# Patient Record
Sex: FEMALE | Race: WHITE | Employment: FULL TIME | URBAN - METROPOLITAN AREA
[De-identification: names, ages, dates, MRNs, and addresses within clinical notes are randomized per-mention and may not be internally consistent; named-entity substitution may affect disease eponyms.]

---

## 2017-05-18 PROBLEM — R00.2 PALPITATIONS: Status: ACTIVE | Noted: 2017-05-18

## 2017-05-18 PROBLEM — F32.A DEPRESSIVE DISORDER: Status: ACTIVE | Noted: 2017-05-18

## 2017-05-18 PROBLEM — K58.9 IRRITABLE BOWEL SYNDROME: Status: ACTIVE | Noted: 2017-05-18

## 2017-05-18 PROBLEM — K21.9 GERD (GASTROESOPHAGEAL REFLUX DISEASE): Status: ACTIVE | Noted: 2017-05-18

## 2017-05-18 PROBLEM — R79.9 BLOOD CHEMISTRY ABNORMALITY: Status: ACTIVE | Noted: 2017-05-18

## 2018-06-13 PROBLEM — F32.A DEPRESSIVE DISORDER: Status: RESOLVED | Noted: 2017-05-18 | Resolved: 2018-06-13

## 2018-06-13 PROBLEM — K58.9 IRRITABLE BOWEL SYNDROME: Status: RESOLVED | Noted: 2017-05-18 | Resolved: 2018-06-13

## 2018-06-13 PROBLEM — R00.2 PALPITATIONS: Status: RESOLVED | Noted: 2017-05-18 | Resolved: 2018-06-13

## 2018-06-13 PROBLEM — R79.9 BLOOD CHEMISTRY ABNORMALITY: Status: RESOLVED | Noted: 2017-05-18 | Resolved: 2018-06-13

## 2018-07-19 ENCOUNTER — HOSPITAL ENCOUNTER (OUTPATIENT)
Dept: SLEEP CENTER | Age: 58
Discharge: HOME | End: 2018-07-19
Payer: COMMERCIAL

## 2018-07-19 PROCEDURE — 95810 POLYSOM 6/> YRS 4/> PARAM: CPT

## 2018-08-24 ENCOUNTER — HOSPITAL ENCOUNTER (OUTPATIENT)
Dept: SLEEP CENTER | Age: 58
Discharge: HOME | End: 2018-08-24
Payer: COMMERCIAL

## 2018-08-24 PROCEDURE — 95811 POLYSOM 6/>YRS CPAP 4/> PARM: CPT

## 2018-09-13 ENCOUNTER — HOSPITAL ENCOUNTER (OUTPATIENT)
Age: 58
Discharge: HOME | End: 2018-09-15
Payer: COMMERCIAL

## 2018-09-13 PROCEDURE — 87077 CULTURE AEROBIC IDENTIFY: CPT

## 2018-09-13 PROCEDURE — 87329 GIARDIA AG IA: CPT

## 2018-09-13 PROCEDURE — 87045 FECES CULTURE AEROBIC BACT: CPT

## 2018-09-13 PROCEDURE — 87324 CLOSTRIDIUM AG IA: CPT

## 2018-09-13 PROCEDURE — 87081 CULTURE SCREEN ONLY: CPT

## 2018-09-13 PROCEDURE — 87186 SC STD MICRODIL/AGAR DIL: CPT

## 2018-09-13 PROCEDURE — 89055 LEUKOCYTE ASSESSMENT FECAL: CPT

## 2018-09-14 LAB
C DIFFICILE TOXIN, EIA: NORMAL
C DIFFICILE TOXIN, EIA: NORMAL
GIARDIA ANTIGEN STOOL: NORMAL

## 2018-09-15 LAB
WHITE BLOOD CELLS (WBC), STOOL: NORMAL
WHITE BLOOD CELLS (WBC), STOOL: NORMAL

## 2018-09-16 LAB
CULTURE, STOOL: NORMAL
CULTURE, STOOL: NORMAL

## 2018-09-17 LAB
VRE CULTURE: NORMAL
VRE CULTURE: NORMAL

## 2018-12-15 ENCOUNTER — HOSPITAL ENCOUNTER (OUTPATIENT)
Dept: GENERAL RADIOLOGY | Age: 58
Discharge: HOME | End: 2018-12-17
Payer: COMMERCIAL

## 2018-12-15 ENCOUNTER — HOSPITAL ENCOUNTER (OUTPATIENT)
Age: 58
Discharge: HOME | End: 2018-12-17
Payer: COMMERCIAL

## 2018-12-15 ENCOUNTER — HOSPITAL ENCOUNTER (OUTPATIENT)
Age: 58
Discharge: HOME | End: 2018-12-15
Payer: COMMERCIAL

## 2018-12-15 DIAGNOSIS — R53.83 FATIGUE, UNSPECIFIED TYPE: ICD-10-CM

## 2018-12-15 DIAGNOSIS — E66.01 OBESITY, CLASS III, BMI 40-49.9 (MORBID OBESITY) (HCC): ICD-10-CM

## 2018-12-15 DIAGNOSIS — R19.7 DIARRHEA, UNSPECIFIED TYPE: ICD-10-CM

## 2018-12-15 DIAGNOSIS — E78.2 MIXED HYPERLIPIDEMIA: ICD-10-CM

## 2018-12-15 DIAGNOSIS — R06.02 SOB (SHORTNESS OF BREATH): ICD-10-CM

## 2018-12-15 DIAGNOSIS — E07.9 THYROID DISEASE: ICD-10-CM

## 2018-12-15 DIAGNOSIS — E55.9 AVITAMINOSIS D: ICD-10-CM

## 2018-12-15 DIAGNOSIS — R73.09 OTHER ABNORMAL GLUCOSE: ICD-10-CM

## 2018-12-15 DIAGNOSIS — R53.81 MALAISE: ICD-10-CM

## 2018-12-15 LAB
ALBUMIN SERPL-MCNC: 4.5 G/DL (ref 3.5–5.2)
ALP BLD-CCNC: 53 U/L (ref 35–104)
ALT SERPL-CCNC: 16 U/L (ref 0–32)
ANION GAP SERPL CALCULATED.3IONS-SCNC: 14 MMOL/L (ref 7–16)
AST SERPL-CCNC: 17 U/L (ref 0–31)
BACTERIA: ABNORMAL /HPF
BASOPHILS ABSOLUTE: 0.07 E9/L (ref 0–0.2)
BASOPHILS RELATIVE PERCENT: 0.8 % (ref 0–2)
BILIRUB SERPL-MCNC: 0.5 MG/DL (ref 0–1.2)
BILIRUBIN URINE: NEGATIVE
BLOOD, URINE: NEGATIVE
BUN BLDV-MCNC: 10 MG/DL (ref 6–20)
CALCIUM SERPL-MCNC: 8.8 MG/DL (ref 8.6–10.2)
CHLORIDE BLD-SCNC: 102 MMOL/L (ref 98–107)
CHOLESTEROL, TOTAL: 182 MG/DL (ref 0–199)
CLARITY: CLEAR
CO2: 25 MMOL/L (ref 22–29)
COLOR: YELLOW
CREAT SERPL-MCNC: 0.7 MG/DL (ref 0.5–1)
EOSINOPHILS ABSOLUTE: 0.12 E9/L (ref 0.05–0.5)
EOSINOPHILS RELATIVE PERCENT: 1.4 % (ref 0–6)
EPITHELIAL CELLS, UA: ABNORMAL /HPF
GFR AFRICAN AMERICAN: >60
GFR NON-AFRICAN AMERICAN: >60 ML/MIN/1.73
GLUCOSE BLD-MCNC: 114 MG/DL (ref 74–99)
GLUCOSE URINE: NEGATIVE MG/DL
HBA1C MFR BLD: 5.5 % (ref 4–5.6)
HCT VFR BLD CALC: 41.9 % (ref 34–48)
HDLC SERPL-MCNC: 42 MG/DL
HEMOGLOBIN: 13.5 G/DL (ref 11.5–15.5)
IMMATURE GRANULOCYTES #: 0.03 E9/L
IMMATURE GRANULOCYTES %: 0.3 % (ref 0–5)
KETONES, URINE: ABNORMAL MG/DL
LDL CHOLESTEROL CALCULATED: 112 MG/DL (ref 0–99)
LEUKOCYTE ESTERASE, URINE: ABNORMAL
LYMPHOCYTES ABSOLUTE: 2.42 E9/L (ref 1.5–4)
LYMPHOCYTES RELATIVE PERCENT: 27.8 % (ref 20–42)
MAGNESIUM: 2.1 MG/DL (ref 1.6–2.6)
MCH RBC QN AUTO: 30.5 PG (ref 26–35)
MCHC RBC AUTO-ENTMCNC: 32.2 % (ref 32–34.5)
MCV RBC AUTO: 94.8 FL (ref 80–99.9)
MONOCYTES ABSOLUTE: 0.62 E9/L (ref 0.1–0.95)
MONOCYTES RELATIVE PERCENT: 7.1 % (ref 2–12)
NEUTROPHILS ABSOLUTE: 5.46 E9/L (ref 1.8–7.3)
NEUTROPHILS RELATIVE PERCENT: 62.6 % (ref 43–80)
NITRITE, URINE: POSITIVE
PDW BLD-RTO: 12.5 FL (ref 11.5–15)
PH UA: 6 (ref 5–9)
PHOSPHORUS: 3.4 MG/DL (ref 2.5–4.5)
PLATELET # BLD: 286 E9/L (ref 130–450)
PMV BLD AUTO: 10.5 FL (ref 7–12)
POTASSIUM SERPL-SCNC: 3.8 MMOL/L (ref 3.5–5)
PROTEIN UA: NEGATIVE MG/DL
RBC # BLD: 4.42 E12/L (ref 3.5–5.5)
RBC UA: ABNORMAL /HPF (ref 0–2)
SODIUM BLD-SCNC: 141 MMOL/L (ref 132–146)
SPECIFIC GRAVITY UA: 1.02 (ref 1–1.03)
T4 FREE: 1.15 NG/DL (ref 0.93–1.7)
TOTAL PROTEIN: 7.7 G/DL (ref 6.4–8.3)
TRIGL SERPL-MCNC: 142 MG/DL (ref 0–149)
TSH SERPL DL<=0.05 MIU/L-ACNC: 0.9 UIU/ML (ref 0.27–4.2)
UROBILINOGEN, URINE: 0.2 E.U./DL
VLDLC SERPL CALC-MCNC: 28 MG/DL
WBC # BLD: 8.7 E9/L (ref 4.5–11.5)
WBC UA: ABNORMAL /HPF (ref 0–5)

## 2018-12-15 PROCEDURE — 71046 X-RAY EXAM CHEST 2 VIEWS: CPT

## 2018-12-15 PROCEDURE — 80053 COMPREHEN METABOLIC PANEL: CPT

## 2018-12-15 PROCEDURE — 36415 COLL VENOUS BLD VENIPUNCTURE: CPT

## 2018-12-15 PROCEDURE — 84443 ASSAY THYROID STIM HORMONE: CPT

## 2018-12-15 PROCEDURE — 80061 LIPID PANEL: CPT

## 2018-12-15 PROCEDURE — 81001 URINALYSIS AUTO W/SCOPE: CPT

## 2018-12-15 PROCEDURE — 83735 ASSAY OF MAGNESIUM: CPT

## 2018-12-15 PROCEDURE — 84439 ASSAY OF FREE THYROXINE: CPT

## 2018-12-15 PROCEDURE — 85025 COMPLETE CBC W/AUTO DIFF WBC: CPT

## 2018-12-15 PROCEDURE — 83036 HEMOGLOBIN GLYCOSYLATED A1C: CPT

## 2018-12-15 PROCEDURE — 84100 ASSAY OF PHOSPHORUS: CPT

## 2018-12-15 PROCEDURE — 82652 VIT D 1 25-DIHYDROXY: CPT

## 2018-12-18 LAB — VITAMIN D 1,25-DIHYDROXY: 75.3 PG/ML (ref 19.9–79.3)

## 2019-11-22 ENCOUNTER — OFFICE VISIT (OUTPATIENT)
Dept: PHYSICAL MEDICINE AND REHAB | Age: 59
End: 2019-11-22
Payer: COMMERCIAL

## 2019-11-22 VITALS
HEIGHT: 66 IN | HEART RATE: 84 BPM | WEIGHT: 283 LBS | BODY MASS INDEX: 45.48 KG/M2 | SYSTOLIC BLOOD PRESSURE: 128 MMHG | DIASTOLIC BLOOD PRESSURE: 74 MMHG

## 2019-11-22 DIAGNOSIS — R20.2 PARESTHESIA OF BOTH HANDS: ICD-10-CM

## 2019-11-22 DIAGNOSIS — M76.62 TENDONITIS, ACHILLES, LEFT: ICD-10-CM

## 2019-11-22 DIAGNOSIS — G56.03 BILATERAL CARPAL TUNNEL SYNDROME: Primary | ICD-10-CM

## 2019-11-22 DIAGNOSIS — M25.572 CHRONIC PAIN OF LEFT ANKLE: ICD-10-CM

## 2019-11-22 DIAGNOSIS — G89.29 CHRONIC PAIN OF LEFT ANKLE: ICD-10-CM

## 2019-11-22 PROCEDURE — 99214 OFFICE O/P EST MOD 30 MIN: CPT | Performed by: PHYSICAL MEDICINE & REHABILITATION

## 2019-12-03 ENCOUNTER — HOSPITAL ENCOUNTER (OUTPATIENT)
Dept: MAMMOGRAPHY | Age: 59
Discharge: HOME | End: 2019-12-05
Payer: COMMERCIAL

## 2019-12-03 ENCOUNTER — HOSPITAL ENCOUNTER (OUTPATIENT)
Age: 59
Discharge: HOME | End: 2019-12-03
Payer: COMMERCIAL

## 2019-12-03 ENCOUNTER — HOSPITAL ENCOUNTER (OUTPATIENT)
Dept: ULTRASOUND IMAGING | Age: 59
Discharge: HOME | End: 2019-12-03
Payer: COMMERCIAL

## 2019-12-03 DIAGNOSIS — Z12.39 BREAST CANCER SCREENING: ICD-10-CM

## 2019-12-03 DIAGNOSIS — N63.0 BREAST LUMP: ICD-10-CM

## 2019-12-03 DIAGNOSIS — E66.01 OBESITY, CLASS III, BMI 40-49.9 (MORBID OBESITY) (HCC): ICD-10-CM

## 2019-12-03 DIAGNOSIS — R06.02 SOB (SHORTNESS OF BREATH): ICD-10-CM

## 2019-12-03 PROCEDURE — 76642 ULTRASOUND BREAST LIMITED: CPT

## 2019-12-03 PROCEDURE — 77063 BREAST TOMOSYNTHESIS BI: CPT

## 2019-12-03 PROCEDURE — 93005 ELECTROCARDIOGRAM TRACING: CPT

## 2019-12-06 ENCOUNTER — HOSPITAL ENCOUNTER (OUTPATIENT)
Dept: OCCUPATIONAL THERAPY | Age: 59
Setting detail: THERAPIES SERIES
Discharge: HOME | End: 2019-12-06
Payer: COMMERCIAL

## 2019-12-06 LAB
EKG ATRIAL RATE: 59 BPM
EKG P AXIS: 64 DEGREES
EKG P-R INTERVAL: 142 MS
EKG Q-T INTERVAL: 440 MS
EKG QRS DURATION: 78 MS
EKG QTC CALCULATION (BAZETT): 435 MS
EKG R AXIS: 62 DEGREES
EKG T AXIS: 49 DEGREES
EKG VENTRICULAR RATE: 59 BPM

## 2019-12-06 PROCEDURE — 97165 OT EVAL LOW COMPLEX 30 MIN: CPT | Performed by: OCCUPATIONAL THERAPIST

## 2019-12-06 PROCEDURE — 97530 THERAPEUTIC ACTIVITIES: CPT | Performed by: OCCUPATIONAL THERAPIST

## 2019-12-09 ENCOUNTER — HOSPITAL ENCOUNTER (OUTPATIENT)
Age: 59
Discharge: HOME | End: 2019-12-09
Payer: COMMERCIAL

## 2019-12-09 ENCOUNTER — HOSPITAL ENCOUNTER (OUTPATIENT)
Dept: GENERAL RADIOLOGY | Age: 59
Discharge: HOME | End: 2019-12-11
Payer: COMMERCIAL

## 2019-12-09 ENCOUNTER — HOSPITAL ENCOUNTER (OUTPATIENT)
Age: 59
Discharge: HOME | End: 2019-12-11
Payer: COMMERCIAL

## 2019-12-09 DIAGNOSIS — R53.81 MALAISE: ICD-10-CM

## 2019-12-09 DIAGNOSIS — R53.83 FATIGUE, UNSPECIFIED TYPE: ICD-10-CM

## 2019-12-09 DIAGNOSIS — R06.02 SOB (SHORTNESS OF BREATH): ICD-10-CM

## 2019-12-09 DIAGNOSIS — R73.09 OTHER ABNORMAL GLUCOSE: ICD-10-CM

## 2019-12-09 DIAGNOSIS — E66.01 OBESITY, CLASS III, BMI 40-49.9 (MORBID OBESITY) (HCC): ICD-10-CM

## 2019-12-09 DIAGNOSIS — E55.9 VITAMIN D DEFICIENCY: ICD-10-CM

## 2019-12-09 LAB
ALBUMIN SERPL-MCNC: 4.2 G/DL (ref 3.5–5.2)
ALP BLD-CCNC: 56 U/L (ref 35–104)
ALT SERPL-CCNC: 15 U/L (ref 0–32)
ANION GAP SERPL CALCULATED.3IONS-SCNC: 12 MMOL/L (ref 7–16)
AST SERPL-CCNC: 15 U/L (ref 0–31)
BACTERIA: NORMAL /HPF
BASOPHILS ABSOLUTE: 0.09 E9/L (ref 0–0.2)
BASOPHILS RELATIVE PERCENT: 1.3 % (ref 0–2)
BILIRUB SERPL-MCNC: 0.4 MG/DL (ref 0–1.2)
BILIRUBIN URINE: NEGATIVE
BLOOD, URINE: NEGATIVE
BUN BLDV-MCNC: 14 MG/DL (ref 6–20)
CALCIUM SERPL-MCNC: 9.5 MG/DL (ref 8.6–10.2)
CHLORIDE BLD-SCNC: 103 MMOL/L (ref 98–107)
CHOLESTEROL, TOTAL: 196 MG/DL (ref 0–199)
CLARITY: CLEAR
CO2: 24 MMOL/L (ref 22–29)
COLOR: YELLOW
CREAT SERPL-MCNC: 0.8 MG/DL (ref 0.5–1)
EOSINOPHILS ABSOLUTE: 0.14 E9/L (ref 0.05–0.5)
EOSINOPHILS RELATIVE PERCENT: 2.1 % (ref 0–6)
EPITHELIAL CELLS, UA: NORMAL /HPF
FOLATE: 15.6 NG/ML (ref 4.8–24.2)
GFR AFRICAN AMERICAN: >60
GFR NON-AFRICAN AMERICAN: >60 ML/MIN/1.73
GLUCOSE BLD-MCNC: 114 MG/DL (ref 74–99)
GLUCOSE URINE: NEGATIVE MG/DL
HBA1C MFR BLD: 5.7 % (ref 4–5.6)
HCT VFR BLD CALC: 40.9 % (ref 34–48)
HDLC SERPL-MCNC: 45 MG/DL
HEMOGLOBIN: 13.2 G/DL (ref 11.5–15.5)
IMMATURE GRANULOCYTES #: 0.02 E9/L
IMMATURE GRANULOCYTES %: 0.3 % (ref 0–5)
KETONES, URINE: NEGATIVE MG/DL
LDL CHOLESTEROL CALCULATED: 112 MG/DL (ref 0–99)
LEUKOCYTE ESTERASE, URINE: ABNORMAL
LYMPHOCYTES ABSOLUTE: 2.21 E9/L (ref 1.5–4)
LYMPHOCYTES RELATIVE PERCENT: 32.6 % (ref 20–42)
MAGNESIUM: 2.2 MG/DL (ref 1.6–2.6)
MCH RBC QN AUTO: 30.5 PG (ref 26–35)
MCHC RBC AUTO-ENTMCNC: 32.3 % (ref 32–34.5)
MCV RBC AUTO: 94.5 FL (ref 80–99.9)
MONOCYTES ABSOLUTE: 0.54 E9/L (ref 0.1–0.95)
MONOCYTES RELATIVE PERCENT: 8 % (ref 2–12)
NEUTROPHILS ABSOLUTE: 3.77 E9/L (ref 1.8–7.3)
NEUTROPHILS RELATIVE PERCENT: 55.7 % (ref 43–80)
NITRITE, URINE: NEGATIVE
PDW BLD-RTO: 12.8 FL (ref 11.5–15)
PH UA: 6 (ref 5–9)
PHOSPHORUS: 3.8 MG/DL (ref 2.5–4.5)
PLATELET # BLD: 242 E9/L (ref 130–450)
PMV BLD AUTO: 10.6 FL (ref 7–12)
POTASSIUM SERPL-SCNC: 4.4 MMOL/L (ref 3.5–5)
PROTEIN UA: NEGATIVE MG/DL
RBC # BLD: 4.33 E12/L (ref 3.5–5.5)
RBC UA: NORMAL /HPF (ref 0–2)
SODIUM BLD-SCNC: 139 MMOL/L (ref 132–146)
SPECIFIC GRAVITY UA: <=1.005 (ref 1–1.03)
T4 FREE: 0.96 NG/DL (ref 0.93–1.7)
T4 TOTAL: 5.4 MCG/DL (ref 4.5–11.7)
TOTAL PROTEIN: 7.2 G/DL (ref 6.4–8.3)
TRIGL SERPL-MCNC: 197 MG/DL (ref 0–149)
TSH SERPL DL<=0.05 MIU/L-ACNC: 1.68 UIU/ML (ref 0.27–4.2)
UROBILINOGEN, URINE: 0.2 E.U./DL
VITAMIN B-12: 606 PG/ML (ref 211–946)
VLDLC SERPL CALC-MCNC: 39 MG/DL
WBC # BLD: 6.8 E9/L (ref 4.5–11.5)
WBC UA: NORMAL /HPF (ref 0–5)

## 2019-12-09 PROCEDURE — 82652 VIT D 1 25-DIHYDROXY: CPT

## 2019-12-09 PROCEDURE — 82607 VITAMIN B-12: CPT

## 2019-12-09 PROCEDURE — 80061 LIPID PANEL: CPT

## 2019-12-09 PROCEDURE — 85025 COMPLETE CBC W/AUTO DIFF WBC: CPT

## 2019-12-09 PROCEDURE — 83735 ASSAY OF MAGNESIUM: CPT

## 2019-12-09 PROCEDURE — 83036 HEMOGLOBIN GLYCOSYLATED A1C: CPT

## 2019-12-09 PROCEDURE — 84443 ASSAY THYROID STIM HORMONE: CPT

## 2019-12-09 PROCEDURE — 82746 ASSAY OF FOLIC ACID SERUM: CPT

## 2019-12-09 PROCEDURE — 84100 ASSAY OF PHOSPHORUS: CPT

## 2019-12-09 PROCEDURE — 84439 ASSAY OF FREE THYROXINE: CPT

## 2019-12-09 PROCEDURE — 71046 X-RAY EXAM CHEST 2 VIEWS: CPT

## 2019-12-09 PROCEDURE — 36415 COLL VENOUS BLD VENIPUNCTURE: CPT

## 2019-12-09 PROCEDURE — 81001 URINALYSIS AUTO W/SCOPE: CPT

## 2019-12-09 PROCEDURE — 80053 COMPREHEN METABOLIC PANEL: CPT

## 2019-12-10 LAB — VITAMIN D 1,25-DIHYDROXY: 65 PG/ML (ref 19.9–79.3)

## 2019-12-12 ENCOUNTER — HOSPITAL ENCOUNTER (OUTPATIENT)
Dept: PHYSICAL THERAPY | Age: 59
Setting detail: THERAPIES SERIES
Discharge: HOME | End: 2019-12-12
Payer: COMMERCIAL

## 2019-12-12 PROBLEM — M76.62 LEFT ACHILLES TENDINITIS: Status: ACTIVE | Noted: 2019-12-12

## 2019-12-12 PROCEDURE — G0283 ELEC STIM OTHER THAN WOUND: HCPCS

## 2019-12-12 PROCEDURE — 97161 PT EVAL LOW COMPLEX 20 MIN: CPT

## 2019-12-12 PROCEDURE — 97035 APP MDLTY 1+ULTRASOUND EA 15: CPT

## 2019-12-13 ENCOUNTER — HOSPITAL ENCOUNTER (OUTPATIENT)
Dept: PHYSICAL THERAPY | Age: 59
Setting detail: THERAPIES SERIES
Discharge: HOME | End: 2019-12-13
Payer: COMMERCIAL

## 2019-12-13 PROCEDURE — G0283 ELEC STIM OTHER THAN WOUND: HCPCS

## 2019-12-16 ENCOUNTER — OFFICE VISIT (OUTPATIENT)
Dept: PHYSICAL MEDICINE AND REHAB | Age: 59
End: 2019-12-16
Payer: COMMERCIAL

## 2019-12-16 VITALS — HEIGHT: 66 IN | WEIGHT: 277 LBS | BODY MASS INDEX: 44.52 KG/M2

## 2019-12-16 DIAGNOSIS — G56.03 BILATERAL CARPAL TUNNEL SYNDROME: ICD-10-CM

## 2019-12-16 DIAGNOSIS — G56.03 BILATERAL CARPAL TUNNEL SYNDROME: Primary | ICD-10-CM

## 2019-12-16 PROCEDURE — 20526 THER INJECTION CARP TUNNEL: CPT | Performed by: PHYSICAL MEDICINE & REHABILITATION

## 2019-12-16 PROCEDURE — 95910 NRV CNDJ TEST 7-8 STUDIES: CPT | Performed by: PHYSICAL MEDICINE & REHABILITATION

## 2019-12-16 PROCEDURE — 76942 ECHO GUIDE FOR BIOPSY: CPT | Performed by: PHYSICAL MEDICINE & REHABILITATION

## 2019-12-16 PROCEDURE — 95886 MUSC TEST DONE W/N TEST COMP: CPT | Performed by: PHYSICAL MEDICINE & REHABILITATION

## 2019-12-16 RX ORDER — LIDOCAINE HYDROCHLORIDE 10 MG/ML
2 INJECTION, SOLUTION INFILTRATION; PERINEURAL ONCE
Status: COMPLETED | OUTPATIENT
Start: 2019-12-16 | End: 2019-12-16

## 2019-12-16 RX ORDER — TRIAMCINOLONE ACETONIDE 40 MG/ML
40 INJECTION, SUSPENSION INTRA-ARTICULAR; INTRAMUSCULAR ONCE
Status: COMPLETED | OUTPATIENT
Start: 2019-12-16 | End: 2019-12-16

## 2019-12-16 RX ADMIN — LIDOCAINE HYDROCHLORIDE 2 ML: 10 INJECTION, SOLUTION INFILTRATION; PERINEURAL at 13:13

## 2019-12-16 RX ADMIN — TRIAMCINOLONE ACETONIDE 40 MG: 40 INJECTION, SUSPENSION INTRA-ARTICULAR; INTRAMUSCULAR at 13:13

## 2019-12-17 ENCOUNTER — HOSPITAL ENCOUNTER (OUTPATIENT)
Dept: PHYSICAL THERAPY | Age: 59
Setting detail: THERAPIES SERIES
Discharge: HOME | End: 2019-12-17
Payer: COMMERCIAL

## 2019-12-17 PROCEDURE — G0283 ELEC STIM OTHER THAN WOUND: HCPCS

## 2019-12-17 PROCEDURE — 97033 APP MDLTY 1+IONTPHRSIS EA 15: CPT

## 2019-12-19 ENCOUNTER — HOSPITAL ENCOUNTER (OUTPATIENT)
Dept: PHYSICAL THERAPY | Age: 59
Setting detail: THERAPIES SERIES
Discharge: HOME | End: 2019-12-19
Payer: COMMERCIAL

## 2019-12-19 PROCEDURE — 97035 APP MDLTY 1+ULTRASOUND EA 15: CPT

## 2019-12-19 PROCEDURE — 97033 APP MDLTY 1+IONTPHRSIS EA 15: CPT

## 2019-12-20 ENCOUNTER — HOSPITAL ENCOUNTER (OUTPATIENT)
Dept: GENERAL RADIOLOGY | Age: 59
Discharge: HOME | End: 2019-12-22
Payer: COMMERCIAL

## 2019-12-20 DIAGNOSIS — N63.20 BREAST MASS, LEFT: ICD-10-CM

## 2019-12-20 PROCEDURE — 77065 DX MAMMO INCL CAD UNI: CPT

## 2019-12-23 ENCOUNTER — HOSPITAL ENCOUNTER (OUTPATIENT)
Dept: PHYSICAL THERAPY | Age: 59
Setting detail: THERAPIES SERIES
Discharge: HOME | End: 2019-12-23
Payer: COMMERCIAL

## 2019-12-23 PROCEDURE — G0283 ELEC STIM OTHER THAN WOUND: HCPCS

## 2019-12-23 PROCEDURE — 97033 APP MDLTY 1+IONTPHRSIS EA 15: CPT

## 2019-12-27 ENCOUNTER — HOSPITAL ENCOUNTER (OUTPATIENT)
Dept: PHYSICAL THERAPY | Age: 59
Setting detail: THERAPIES SERIES
Discharge: HOME | End: 2019-12-27
Payer: COMMERCIAL

## 2019-12-31 ENCOUNTER — HOSPITAL ENCOUNTER (OUTPATIENT)
Dept: PHYSICAL THERAPY | Age: 59
Setting detail: THERAPIES SERIES
Discharge: HOME | End: 2019-12-31
Payer: COMMERCIAL

## 2019-12-31 PROCEDURE — G0283 ELEC STIM OTHER THAN WOUND: HCPCS

## 2019-12-31 PROCEDURE — 97035 APP MDLTY 1+ULTRASOUND EA 15: CPT

## 2020-01-06 ENCOUNTER — HOSPITAL ENCOUNTER (OUTPATIENT)
Dept: PHYSICAL THERAPY | Age: 60
Setting detail: THERAPIES SERIES
Discharge: HOME | End: 2020-01-06
Payer: COMMERCIAL

## 2020-01-06 ENCOUNTER — HOSPITAL ENCOUNTER (OUTPATIENT)
Dept: OCCUPATIONAL THERAPY | Age: 60
Setting detail: THERAPIES SERIES
Discharge: HOME | End: 2020-01-06
Payer: COMMERCIAL

## 2020-01-06 PROCEDURE — 97140 MANUAL THERAPY 1/> REGIONS: CPT

## 2020-01-06 PROCEDURE — 97035 APP MDLTY 1+ULTRASOUND EA 15: CPT

## 2020-01-06 NOTE — PROGRESS NOTES
Occupational Therapy    OCCUPATIONAL THERAPY PROGRESS NOTE    Date:  2020     Initial Evaluation Date: 2019     Patient Name:  Uri Milligan                     :  1960     Restrictions/Precautions:  None noted, low fall risk  Diagnosis:  Bilateral CTS                                                        Insurance/Certification information:  [address]  Referring Practitioner:  Dr. Ayo Trimble  Date of Surgery/Injury: onset a few months ago  Plan of care signed (Y/N):  N  Visit# / total visits: 2 / 3-6 sessions     Pain Level: Pt states she has no 'pain' in her arms - its more an irritation with the tingling. Irritation rating in her Left arm on a 0-10 scale is decreasing 3-5/10. (7/10 at eval)  Irritation rating in her R arm is 2/10.        Subjective: \" I did see the physical medicine doctor in the middle of December and she gave me a shot into my Left wrist to help with the carpal tunnel symptoms. Its still bothering me, both sides are, but its definitely not as bad. \"     Objective:   POC     INTERVENTION: COMPLETED: SPECIFICS/COMMENTS:   Modality:     US tx bilateral CT wrist areas 2 x 8 mins For relief of nerve symptoms        AROM:     Median nerve glides x Bilaterally        AAROM:               PROM/Stretching:     Flexor stretches bilaterally x Progressed stretches from wrist only to composite as tolerated. Therapist completed prolonged stretch to wrist tissue carpal area   Wrist joint mobs bilaterally x    Scar Mass/Edema Control:               Strengthening:     NO          Other:     HEP including icing x Pt to cont nerve glides, wrist braces at night, flexor stretches (deepening them) and instructed with icing techniques. Wrist cock up braces x Pt wears when sleeping. Pt does feel they are helping   Kinesiotaping x Pt felt it didn't help and quit using     Assessment/Comments: Pt is making Good progress toward stated plan of care.  Pt reports she did have an

## 2020-01-09 ENCOUNTER — HOSPITAL ENCOUNTER (OUTPATIENT)
Dept: PHYSICAL THERAPY | Age: 60
Setting detail: THERAPIES SERIES
Discharge: HOME | End: 2020-01-09
Payer: COMMERCIAL

## 2020-01-13 ENCOUNTER — HOSPITAL ENCOUNTER (OUTPATIENT)
Dept: OCCUPATIONAL THERAPY | Age: 60
Setting detail: THERAPIES SERIES
Discharge: HOME | End: 2020-01-13
Payer: COMMERCIAL

## 2020-01-13 PROCEDURE — 97035 APP MDLTY 1+ULTRASOUND EA 15: CPT

## 2020-01-13 PROCEDURE — 97140 MANUAL THERAPY 1/> REGIONS: CPT

## 2020-01-13 NOTE — PROGRESS NOTES
Occupational Therapy    OCCUPATIONAL THERAPY PROGRESS NOTE    Date:  2020     Initial Evaluation Date: 2019     Patient Name:  Ana Rosa                     :  1960     Restrictions/Precautions:  None noted, low fall risk  Diagnosis:  Bilateral CTS                                                        Insurance/Certification information:  [address]  Referring Practitioner:  Dr. Vijay Talbert  Date of Surgery/Injury: onset a few months ago  Plan of care signed (Y/N):  N  Visit# / total visits: 3 / 3-6 sessions     Pain Level: Pt states she has no 'pain' in her arms - its more an irritation with the tingling. Irritation rating in her Left arm on a 0-10 scale is decreasing 3-5/10. (7/10 at eval)  Irritation rating in her R arm is 2/10.        Subjective: \" I feel like these stretches and the US tx and the wrist splints at night are all helping some. \"     Objective:   POC     INTERVENTION: COMPLETED: SPECIFICS/COMMENTS:   Modality:     US tx bilateral CT wrist areas 2 x 8 mins For relief of nerve symptoms        AROM:     Median nerve glides x Bilaterally        AAROM:               PROM/Stretching:     Flexor stretches bilaterally x Progressed stretches from wrist only to composite as tolerated. Therapist completed prolonged stretch to wrist tissue carpal area. .. cont to increase aggressiveness as yolanda   Wrist joint mobs bilaterally x    Scar Mass/Edema Control:               Strengthening:     NO          Other:     HEP including icing x Pt to cont nerve glides, wrist braces at night, flexor stretches (deepening them) and instructed with icing techniques. Wrist cock up braces x Pt wears when sleeping. Pt does feel they are helping   Kinesiotaping x Pt felt it didn't help and quit using     Assessment/Comments: Pt is making Good progress toward stated plan of care. Pt reports she did have an EMG since December OT visit. EMG did show carpal tunnel.  Pt also reports she saw a physical medicine doctor that injected her Left wrist in the middle of December to alleviate tingling symptoms. Pt reports her symptoms remain, but they aren't as intense. Therapist cont'd US to bilateral volar wrist carpal areas as well as nerve glides and ^d gentle aggressiveness of passive flexor stretches. Therapist instructed pt how to deepen flexor stretches as tolerated. Pt tolerated well and reports her symptoms decrease post tx. Will progress as tolerated. -Rehab Potential: Good  -Requires OT Follow Up: Yes  Time In:3:30 pm            Time Out: 4:20 pm             Visit #: 3    Treatment Charges: Mins Units   Modalities US 16 1   Ther Exercise     Manual Therapy 34 2   Thera Activities     ADL/Home Mgt      Neuro Re-education     Gait Training     Group Therapy     Non-Billable Service Time     Other     Total Time/Units 50 3       -Response to Treatment: Good. GOALS (Long term same as Short term):  1. ) Patient will demonstrate good understanding of home program (exercises/activities/diagnosis/prognosis/goals) with good accuracy. 2. ) Patient to report decreased irritation/ tingeling  in their affected R and L  upper extremity from 7/10 to 3 in her L and in R from 2/10 to 0./10.   3. ) Patient to report 100% compliance with their splint wear, care, and precautions if needed.      Plan:   [x]  Continues Plan of care: Treatment covered based on POC and graduated to patient's progress. Pt education continues at each visit to obtain maximum benefits from skilled OT intervention.   []  Alter Plan of care:   []  Discharge:      Kailyn  MAYELIN/LASHON, [id number]

## 2020-01-16 ENCOUNTER — HOSPITAL ENCOUNTER (OUTPATIENT)
Dept: OCCUPATIONAL THERAPY | Age: 60
Setting detail: THERAPIES SERIES
Discharge: HOME | End: 2020-01-16
Payer: COMMERCIAL

## 2020-01-16 PROCEDURE — 97035 APP MDLTY 1+ULTRASOUND EA 15: CPT

## 2020-01-16 PROCEDURE — 97140 MANUAL THERAPY 1/> REGIONS: CPT

## 2020-01-16 NOTE — PROGRESS NOTES
Occupational Therapy    OCCUPATIONAL THERAPY PROGRESS NOTE    Date:  2020     Initial Evaluation Date: 2019     Patient Name:  Aleah Staples                     :  1960     Restrictions/Precautions:  None noted, low fall risk  Diagnosis:  Bilateral CTS                                                        Insurance/Certification information:  [address]  Referring Practitioner:  Dr. Cony Vines  Date of Surgery/Injury: onset a few months ago  Plan of care signed (Y/N):  N  Visit# / total visits: 4 / 3-6 sessions     Pain Level: Pt states she has no 'pain' in her arms - its more an irritation with the tingling. Irritation rating in her Left arm on a 0-10 scale is decreasing 3/10. (7/10 at eval) -decreasing  Irritation rating in her R arm is 1/10 - decreasing       Subjective: \" I feel like my hands are doing better than they were. \"      Objective:   POC     INTERVENTION: COMPLETED: SPECIFICS/COMMENTS:   Modality:     US tx bilateral CT wrist areas 2 x 8 mins For relief of nerve symptoms        AROM:     Median nerve glides x Bilaterally        AAROM:               PROM/Stretching:     Flexor stretches bilaterally x Progressed stretches from wrist only to composite as tolerated. Therapist completed prolonged stretch to wrist tissue carpal area. .. cont to increase aggressiveness as yolanda   Wrist joint mobs bilaterally x    Scar Mass/Edema Control:               Strengthening:     NO          Other:     HEP including icing x Pt to cont nerve glides, wrist braces at night, flexor stretches (deepening them) and instructed with icing techniques. Wrist cock up braces x Pt wears when sleeping. Pt does feel they are helping   Kinesiotaping x Pt felt it didn't help and quit using     Assessment/Comments: Pt is making Good progress toward stated plan of care.  Therapist cont'd US to bilateral volar wrist carpal areas as well as nerve glides and ^d gentle aggressiveness of passive flexor stretches. Therapist instructed pt how to deepen flexor stretches as tolerated. Pt reports decreasing 1/10 pain in R hand/wrist and decreasing 3/10 pain level in Left hand/wrist this date. Will progress as tolerated. -Rehab Potential: Good  -Requires OT Follow Up: Yes  Time In:3:35 pm            Time Out: 4:25 pm             Visit #: 4    Treatment Charges: Mins Units   Modalities US 16 1   Ther Exercise     Manual Therapy 34 2   Thera Activities     ADL/Home Mgt      Neuro Re-education     Gait Training     Group Therapy     Non-Billable Service Time     Other     Total Time/Units 50 3       -Response to Treatment: Good. GOALS (Long term same as Short term):  1. ) Patient will demonstrate good understanding of home program (exercises/activities/diagnosis/prognosis/goals) with good accuracy. 2. ) Patient to report decreased irritation/ tingeling  in their affected R and L  upper extremity from 7/10 to 3 in her L and in R from 2/10 to 0./10.   3. ) Patient to report 100% compliance with their splint wear, care, and precautions if needed.      Plan:   [x]  Continues Plan of care: Treatment covered based on POC and graduated to patient's progress. Pt education continues at each visit to obtain maximum benefits from skilled OT intervention.   []  Alter Plan of care:   []  Discharge:      Samia DICK/LASHON, [id number]

## 2020-01-20 ENCOUNTER — HOSPITAL ENCOUNTER (OUTPATIENT)
Dept: OCCUPATIONAL THERAPY | Age: 60
Setting detail: THERAPIES SERIES
Discharge: HOME | End: 2020-01-20
Payer: COMMERCIAL

## 2020-01-20 PROCEDURE — 97035 APP MDLTY 1+ULTRASOUND EA 15: CPT

## 2020-01-20 PROCEDURE — 97140 MANUAL THERAPY 1/> REGIONS: CPT

## 2020-01-22 ENCOUNTER — HOSPITAL ENCOUNTER (OUTPATIENT)
Dept: OCCUPATIONAL THERAPY | Age: 60
Setting detail: THERAPIES SERIES
Discharge: HOME | End: 2020-01-22
Payer: COMMERCIAL

## 2020-01-22 PROCEDURE — 97035 APP MDLTY 1+ULTRASOUND EA 15: CPT

## 2020-01-22 PROCEDURE — 97140 MANUAL THERAPY 1/> REGIONS: CPT

## 2020-01-22 NOTE — PROGRESS NOTES
Occupational Therapy    OCCUPATIONAL THERAPY PROGRESS/ POSSIBLE DISCHARGE NOTE    Date:  2020     Initial Evaluation Date: 2019     Patient Name:  Uri Milligan                     :  1960     Restrictions/Precautions:  None noted, low fall risk  Diagnosis:  Bilateral CTS                                                        Insurance/Certification information:  [address]  Referring Practitioner:  Dr. Ayo Trimble  Date of Surgery/Injury: onset a few months ago  Plan of care signed (Y/N):  N  Visit# / total visits: 6 / 3-6 sessions     Pain Level: Pt states she has no 'pain' in her arms - its more an irritation with the tingling. Irritation rating in her Left arm on a 0-10 scale is decreasing 3/10. (7/10 at IE) -decreased  Irritation rating in her R arm is 1/10 - decreased from 2/10 @ IE       Subjective: \" Today is my last appt. I see Dr. [facility] again next week. My symptoms aren't gone but my hands feel a lot better. I've even noticed I'm not dropping things anymore with my left hand. \"     Objective:   POC     INTERVENTION: COMPLETED: SPECIFICS/COMMENTS:   Modality:     US tx bilateral CT wrist areas 2 x 8 mins For relief of nerve symptoms        AROM:     Median nerve glides x Bilaterally        AAROM:               PROM/Stretching:     Flexor stretches bilaterally x Progressed stretches from wrist only to composite as tolerated. Therapist completed prolonged stretch to wrist tissue carpal area. .. cont to increase aggressiveness as yolanda. .. individual finger flexor stretches with wrist extended completed   Wrist joint mobs bilaterally x    Scar Mass/Edema Control:               Strengthening:     NO          Other:     HEP including icing x Pt to cont nerve glides, wrist braces at night, flexor stretches (deepening them) and cont with icing techniques. Wrist cock up braces x Pt wears when sleeping.  Pt does feel they are helping   Kinesiotaping x Pt felt it didn't help and

## 2020-01-29 ENCOUNTER — OFFICE VISIT (OUTPATIENT)
Dept: PHYSICAL MEDICINE AND REHAB | Age: 60
End: 2020-01-29
Payer: COMMERCIAL

## 2020-01-29 VITALS
SYSTOLIC BLOOD PRESSURE: 130 MMHG | BODY MASS INDEX: 29.09 KG/M2 | DIASTOLIC BLOOD PRESSURE: 71 MMHG | WEIGHT: 181 LBS | HEIGHT: 66 IN | HEART RATE: 68 BPM

## 2020-01-29 PROCEDURE — 99213 OFFICE O/P EST LOW 20 MIN: CPT | Performed by: PHYSICAL MEDICINE & REHABILITATION

## 2020-01-29 RX ORDER — DEXAMETHASONE SODIUM PHOSPHATE 4 MG/ML
4 INJECTION, SOLUTION INTRA-ARTICULAR; INTRALESIONAL; INTRAMUSCULAR; INTRAVENOUS; SOFT TISSUE DAILY PRN
Qty: 20 ML | Refills: 0 | Status: SHIPPED | OUTPATIENT
Start: 2020-01-29 | End: 2020-02-18

## 2020-01-29 NOTE — PROGRESS NOTES
Tj Thomas D.O. [facility]   [address] [facility] [address]  Phone: [phone]  Fax: [phone]        20    Chief Complaint   Patient presents with    Carpal Tunnel     follwo up    Tendonitis     Left leg        HPI:  Constance Agustin is a 63y.o. year old woman seen today in follow up regarding carpal tunnel syndrome. Interval history: Since the last visit the patient had 60% improvement with the CTS with injection and therapy. She is using night splints. Her  strength has improved. She has received iontophoresis for her foot but they are using Aspiring. PT is requesting a prescription for dexamethasone for the iontophoresis to try instead of the Aspirin. Today, the pain is rated Pain Score:   3 where 0 is no pain and 10 is pain as bad as it can be. The pain is located in the left hand,  does not radiate, and is described as numb. This pain occurs intermittently. The symptoms have been better since onset. Symptoms are exacerbated by standing and walking use of hand. Factors which relieve the pain include iontophoresis, light therapy. Other associated symptoms include stiffness. Otherwise, the pain assessment has not changed since the last visit.      Past Medical History:   Diagnosis Date    Alopecia areata     Anxiety     Depression     Hyperlipidemia     Nausea & vomiting     Obesity, Class III, BMI 40-49.9 (morbid obesity) (HCC)     PVC (premature ventricular contraction)     Sleep apnea     CPAP    Thyroid disease      Past Surgical History:   Procedure Laterality Date     SECTION      x2    CHOLECYSTECTOMY      COLONOSCOPY  14    bx done Dr Brooks Res  2011         KNEE ARTHROSCOPY      NERVE BLOCK Bilateral 3/5/14    lumbar facet #1    NERVE BLOCK  14    paravertebral facet bilateral lumbar #2    NERVE BLOCK N/A 14    cerv #1    NERVE BLOCK N/A 2014 by mouth.  ALPRAZolam (XANAX) 0.5 MG tablet Take one by mouth nightly prn for anxiety 30 tablet 0    traMADol (ULTRAM) 50 MG tablet Take 1 tablet by mouth every 8 hours as needed for Pain . (Patient not taking: Reported on 1/29/2020) 120 tablet 0     No current facility-administered medications for this visit. Allergies   Allergen Reactions    Mobic [Meloxicam] Itching     Burning/Itching of there scalp, palms, back and feet.  Morphine Sulfate Itching    Percocet [Oxycodone-Acetaminophen] Itching    Vicodin [Hydrocodone-Acetaminophen] Itching       Review of Systems:  No new weakness, paresthesia, incontinence of bowel or bladder, saddle anesthesia, falls or gait dysfunction. Otherwise, per HPI. Physical Exam:   Blood pressure 130/71, pulse 68, height 5' 6\" (1.676 m), weight 181 lb (82.1 kg), last menstrual period 10/13/2016, not currently breastfeeding. GENERAL: The patient is in no apparent distress. Body habitus is non-obese. HEENT: No rhinorrhea, sneezing, yawning, or lacrimation. No scleral icterus or conjunctival injection. SKIN: No piloerection. No tract marks. No rash. PSYCH: Mood and affect are appropriate. Hygiene is appropriate. CARDIOVASCULAR  Heart is regular rate and rhythm. There is no edema. RESPIRATORY: Respirations are regular and unlabored. There is no cyanosis. GASTROINTESTINAL: Soft abdomen, non-tender. MSK: There is no joint effusion, deformity, instability, swelling, erythema or warmth. AROM is full in the spine and extremities. Spinal curvatures are normal.    Negative TInel. NEURO: Gait is normal. No focal sensorimotor deficit. Reflexes 2+ and symmetric in lower extremities. Impression:   1. Bilateral carpal tunnel syndrome    2. Tendonitis, Achilles, left        Plan:  Continue home exercises, PT. Continue wrist splints. Continue current medications.    Orders Placed This Encounter   Medications    dexamethasone (DECADRON) 4 MG/ML

## 2020-06-01 ENCOUNTER — TELEPHONE (OUTPATIENT)
Dept: ADMINISTRATIVE | Age: 60
End: 2020-06-01

## 2020-06-01 NOTE — TELEPHONE ENCOUNTER
Patient called wants to set up an appt for an injection in her left wrist, she stated she needs a Wednesday and can be reached at [phone].

## 2020-06-10 ENCOUNTER — OFFICE VISIT (OUTPATIENT)
Dept: PHYSICAL MEDICINE AND REHAB | Age: 60
End: 2020-06-10
Payer: COMMERCIAL

## 2020-06-10 VITALS
HEART RATE: 70 BPM | DIASTOLIC BLOOD PRESSURE: 82 MMHG | WEIGHT: 293 LBS | BODY MASS INDEX: 47.09 KG/M2 | HEIGHT: 66 IN | SYSTOLIC BLOOD PRESSURE: 137 MMHG | TEMPERATURE: 97.6 F

## 2020-06-10 PROCEDURE — 20526 THER INJECTION CARP TUNNEL: CPT | Performed by: PHYSICAL MEDICINE & REHABILITATION

## 2020-06-10 PROCEDURE — 76942 ECHO GUIDE FOR BIOPSY: CPT | Performed by: PHYSICAL MEDICINE & REHABILITATION

## 2020-06-10 RX ORDER — LIDOCAINE HYDROCHLORIDE 10 MG/ML
2 INJECTION, SOLUTION INFILTRATION; PERINEURAL ONCE
Status: COMPLETED | OUTPATIENT
Start: 2020-06-10 | End: 2020-06-10

## 2020-06-10 RX ORDER — TRIAMCINOLONE ACETONIDE 40 MG/ML
40 INJECTION, SUSPENSION INTRA-ARTICULAR; INTRAMUSCULAR ONCE
Status: COMPLETED | OUTPATIENT
Start: 2020-06-10 | End: 2020-06-10

## 2020-06-10 RX ADMIN — LIDOCAINE HYDROCHLORIDE 2 ML: 10 INJECTION, SOLUTION INFILTRATION; PERINEURAL at 14:23

## 2020-06-10 RX ADMIN — TRIAMCINOLONE ACETONIDE 40 MG: 40 INJECTION, SUSPENSION INTRA-ARTICULAR; INTRAMUSCULAR at 14:24

## 2020-06-17 ENCOUNTER — TELEPHONE (OUTPATIENT)
Dept: PHYSICAL MEDICINE AND REHAB | Age: 60
End: 2020-06-17

## 2020-06-17 NOTE — TELEPHONE ENCOUNTER
Called patient to follow up on left carpal tunnel injection done in the office on 06/10/2020. Left voicemail and callback number for patient to call with any questions or concerns.

## 2020-07-06 ENCOUNTER — OFFICE VISIT (OUTPATIENT)
Dept: ORTHOPEDIC SURGERY | Age: 60
End: 2020-07-06
Payer: COMMERCIAL

## 2020-07-06 VITALS — HEIGHT: 66 IN | TEMPERATURE: 98 F | WEIGHT: 280 LBS | BODY MASS INDEX: 45 KG/M2

## 2020-07-06 PROCEDURE — 20610 DRAIN/INJ JOINT/BURSA W/O US: CPT | Performed by: ORTHOPAEDIC SURGERY

## 2020-07-06 PROCEDURE — 99204 OFFICE O/P NEW MOD 45 MIN: CPT | Performed by: ORTHOPAEDIC SURGERY

## 2020-07-06 RX ORDER — TRIAMCINOLONE ACETONIDE 40 MG/ML
40 INJECTION, SUSPENSION INTRA-ARTICULAR; INTRAMUSCULAR ONCE
Status: COMPLETED | OUTPATIENT
Start: 2020-07-06 | End: 2020-07-06

## 2020-07-06 RX ADMIN — TRIAMCINOLONE ACETONIDE 40 MG: 40 INJECTION, SUSPENSION INTRA-ARTICULAR; INTRAMUSCULAR at 15:54

## 2020-07-06 NOTE — PROGRESS NOTES
HISTORY Left 08 18 2014    lumbar radiofrequency    UPPER GASTROINTESTINAL ENDOSCOPY  12/16/14    ashly ,bx sm bowel  Dr Cl Webb       Current Outpatient Medications:     omeprazole (PRILOSEC) 20 MG delayed release capsule, Take 1 capsule by mouth daily, Disp: 90 capsule, Rfl: 2    hydrOXYzine (VISTARIL) 25 MG capsule, Take 25 mg by mouth 4 times daily as needed for Anxiety, Disp: , Rfl:     traZODone (DESYREL) 100 MG tablet, Take 1 tablet by mouth nightly, Disp: 90 tablet, Rfl: 1    tiZANidine (ZANAFLEX) 4 MG tablet, Take 1 tablet by mouth every 8 hours as needed (MUSCLE SPASMS), Disp: 90 tablet, Rfl: 0    naproxen (NAPROSYN) 500 MG tablet, Take 1 tablet by mouth 2 times daily (with meals), Disp: 180 tablet, Rfl: 1    simvastatin (ZOCOR) 20 MG tablet, Take 1 tablet by mouth nightly, Disp: 90 tablet, Rfl: 1    sertraline (ZOLOFT) 100 MG tablet, Take 1 tablet by mouth daily Indications: takes 2 tablets at hs Take 2 tablet at hs po (Patient taking differently: Take 100 mg by mouth nightly Indications: takes 2 tablets at hs ), Disp: 180 tablet, Rfl: 3    famotidine (PEPCID) 20 MG tablet, Take 1 tablet by mouth 2 times daily, Disp: 180 tablet, Rfl: 1    levothyroxine (SYNTHROID) 50 MCG tablet, Take 1 tablet by mouth Daily, Disp: 90 tablet, Rfl: 2    atenolol (TENORMIN) 50 MG tablet, Take 1 tablet by mouth nightly, Disp: 90 tablet, Rfl: 2    diclofenac 1 % CREA, Apply 2 g topically 4 times daily To affected area, Disp: 120 g, Rfl: 3    Cholecalciferol (VITAMIN D-3) 5000 UNITS TABS, Take  by mouth., Disp: , Rfl:   Allergies   Allergen Reactions    Mobic [Meloxicam] Itching     Burning/Itching of there scalp, palms, back and feet.      Morphine Sulfate Itching    Percocet [Oxycodone-Acetaminophen] Itching    Vicodin [Hydrocodone-Acetaminophen] Itching     Social History     Socioeconomic History    Marital status:      Spouse name: Shelby Speaker Number of children: 2    Years of education: 14    Highest education level: Not on file   Occupational History    Occupation: LPN   Social Needs    Financial resource strain: Not hard at all   [place] insecurity     Worry: Never true     Inability: Never true   [employer] needs     Medical: No     Non-medical: No   Tobacco Use    Smoking status: Never Smoker    Smokeless tobacco: Never Used   Substance and Sexual Activity    Alcohol use: Yes     Comment: occasionally    Drug use: No    Sexual activity: Yes     Partners: Male   Lifestyle    Physical activity     Days per week: 4 days     Minutes per session: 30 min    Stress: Not at all   Relationships    Social connections     Talks on phone: More than three times a week     Gets together: More than three times a week     Attends Denominational service: More than 4 times per year     Active member of club or organization: No     Attends meetings of clubs or organizations: Never     Relationship status:     Intimate partner violence     Fear of current or ex partner: No     Emotionally abused: No     Physically abused: No     Forced sexual activity: No   Other Topics Concern    Not on file   Social History Narrative    Patient lives at home with . She is independent with ADL's, and does require the use of an assistive device. Family History   Problem Relation Age of Onset    Heart Disease Brother         MI with stents    Other Brother         Frontal lobe lesion    Heart Disease Maternal Grandfather     Heart Disease Paternal Grandfather     Cancer Other     Depression Other     Mental Illness Other     Cancer Mother         breast with bone and liver mets    Bipolar Disorder Father     Other Father         Aspiration Pneumonia    Hypertension Sister     Depression Sister     Anxiety Disorder Sister          REVIEW OF SYSTEMS:     General/Constitution:  (-)weight loss, (-)fever, (-)chills, (-)weakness. Skin: (-) rash,(-) psoriasis,(-) eczema, (-)skin cancer.    Musculoskeletal: (-) fractures,  (-) dislocations,(-) collagen vascular disease, (-) fibromyalgia, (-) multiple sclerosis, (-) muscular dystrophy, (-) RSD,(-) joint pain (-)swelling, (-) joint pain,swelling. Neurologic: (-) epilepsy, (-)seizures,(-) brain tumor,(-) TIA, (-)stroke, (-)headaches, (-)Parkinson disease,(-) memory loss, (-) LOC. Cardiovascular: (-) Chest pain, (-) swelling in legs/feet, (-) SOB, (-) cramping in legs/feet with walking. Respiratory: (-) SOB, (-) Coughing, (-) night sweats. GI: (-) nausea, (-) vomiting, (-) diarrhea, (-) blood in stool, (-) gastric ulcer. Psychiatric: (-) Depression, (-) Anxiety, (-) bipolar disease, (-) Alzheimer's Disease  Allergic/Immunologic: (-) allergies latex, (-) allergies metal, (-) skin sensitivity. Hematlogic: (-) anemia, (-) blood transfusion, (-) DVT/PE, (-) Clotting disorders    Subjective:    Constitution:  Temp 98 °F (36.7 °C)   Ht 5' 6\" (1.676 m)   Wt 280 lb (127 kg)   LMP 10/13/2016   BMI 45.19 kg/m²     Psycihatric:  The patient is alert and oriented x 3, appears to be stated age and in no distress. Respiratory:  Respiratory effort is not labored. Patient is not gasping. Palpation of the chest reveals no tactile fremitus. Skin:  Upon inspection: the skin appears warm, dry and intact. There is  a previous scar over the affected area. There is any cellulitis, lymphedema or cutaneous lesions noted in the lower extremities. Upon palpation there is no induration noted. Neurologic:  Gait: antalgic; Motor exam of the lower extremities show ; quadriceps, hamstrings, foot dorsi and plantar flexors intact R.  5/5 and L. 5/5. Deep tendon reflexes are 2/4 at the knees and 2/4 at the ankles with strong extensor hallicus longus motor strength bilaterally. Sensory to both feet is intact to all sensory roots. Cardiovascular: The vascular exam is normal and is well perfused to distal extremities. Distal pulses DP/PT: R. 2+; L. 2+.   There is cap refill noted less than two seconds in all digits. There is not edema of the bilateral lower extremities. There is not varicosities noted in the distal extremities. Lymph:  Upon palpation,  there is no lymphadenopathy noted in bilateral lower extremities. Musculoskeletal:  Gait: antalgic; examination of the nails and digits reveal no cyanosis or clubbing. Lumbar exam:  On visual inspection, there is not deformity of the spine. full range of motion, no tenderness, palpable spasm or pain on motion. Special tests: Straight Leg Raise negative, Albino test negative. Hip exam:   Upon inspection, there is not deformity noted. Upon palpation there is not tenderness. ROM: is  full and symmetrical.   Strength: Hip Flexors 5/5; Hip Abductors 5/5; Hip Adduction 5/5. Knee exam:  Bilateral knee exam shows;  range of motion of R. Knee is 0 to 115, and L. Knee is 0 to 115. The patient does have  pain on motion, effusion is mild, there is tenderness over the  medial, lateral, anterior region, there are not any masses, there is not ligamentous instability, there is  deformity noted. Knee exam: bilateral positive for moderate crepitations, some mild tenderness laxity is not noted with  stress. There is not a popliteal cyst.    R. Knee:  Lachman's negative, Anterior Drawer negative, Posterior Drawer negative  Gloria's negative, Thallasy  negative,   PF grind test negative, Apprehension test negative, Patellar J sign  negative  L. Knee:  Lachman's negative, Anterior Drawer negative, Posterior Drawer negative  Gloria's negative, Thallasy  negative,   PF grind test negative, Apprehension test negative,  Patellar J sign  negative    Xray Exam:  On the right, marked degenerative osteo arthritic changes involve the lateral   femoral tibial joint compartment.  Moderate narrowing involves the medial   femoral tibial joint compartment.  On the left, no significant effusion,   fracture or cortical erosion was noted.  Degenerative osteo arthritic changes   were noted, moderate to marked in the patellofemoral and marked in the medial   femoral tibial joint compartment. Radiographic findings reviewed with patient    Assessment:  Encounter Diagnoses   Name Primary?  Primary osteoarthritis of right knee Yes    Primary osteoarthritis of left knee        Plan:  Natural history and expected course discussed. Questions answered. Educational materials distributed. Rest, ice, compression, and elevation (RICE) therapy. Reduction in offending activity. I had a lengthy discussion with the patient regarding their diagnosis. I explained treatment options including surgical vs non surgical treatment. I reviewed in detail the risks and benefits and outlined the procedure in detail with expected outcomes and possible complications. I also discussed non surgical treatment such as injections (CSI and visco supplementation), physical therapy, topical creams and NSAID's. They have elected for conservative management at this time. Continue naprosyn PRN  Continue voltaren   I will proceed with a cortisone injection in the Bilateral knees. Verbal and written consent was obtained for the injections. Skin was prepped with alcohol. 1 ml of Kenalog 40mg and 9 ml of 0.25% Marcaine was  injected to Bilateral knees. The injections were given through the lateral side of the knees. The patient tolerated the injections well.  I will see the patient back prn

## 2020-08-10 ENCOUNTER — OFFICE VISIT (OUTPATIENT)
Dept: ORTHOPEDIC SURGERY | Age: 60
End: 2020-08-10
Payer: COMMERCIAL

## 2020-08-10 VITALS — BODY MASS INDEX: 42.38 KG/M2 | WEIGHT: 270 LBS | HEIGHT: 67 IN

## 2020-08-10 PROCEDURE — 99214 OFFICE O/P EST MOD 30 MIN: CPT | Performed by: ORTHOPAEDIC SURGERY

## 2020-08-10 PROCEDURE — 20610 DRAIN/INJ JOINT/BURSA W/O US: CPT | Performed by: ORTHOPAEDIC SURGERY

## 2020-08-10 RX ORDER — TRIAMCINOLONE ACETONIDE 40 MG/ML
40 INJECTION, SUSPENSION INTRA-ARTICULAR; INTRAMUSCULAR ONCE
Status: COMPLETED | OUTPATIENT
Start: 2020-08-10 | End: 2020-08-10

## 2020-08-10 RX ADMIN — TRIAMCINOLONE ACETONIDE 40 MG: 40 INJECTION, SUSPENSION INTRA-ARTICULAR; INTRAMUSCULAR at 13:41

## 2020-08-10 NOTE — PROGRESS NOTES
Chief Complaint   Patient presents with    Follow-up     follow up from bilateral knee pain, patient states she twisted her right knee on 2020[facility] bilateral knee pain R>L. she reports this is worse than when I saw her last.  The patient's pain level is a 8/10 R, 6/10 L. The previous treatment was successful. She had cortisone 1 month ago and wa doing well until she fell.      Past Medical History:   Diagnosis Date    Alopecia areata     Anxiety     Depression     Hyperlipidemia     Nausea & vomiting     Obesity, Class III, BMI 40-49.9 (morbid obesity) (HCC)     PVC (premature ventricular contraction)     Sleep apnea     CPAP    Thyroid disease      Past Surgical History:   Procedure Laterality Date     SECTION      x2    CHOLECYSTECTOMY      COLONOSCOPY  14    bx done Dr [facility][1]  2011         KNEE ARTHROSCOPY      NERVE BLOCK Bilateral 3/5/14    lumbar facet #1    NERVE BLOCK  14    paravertebral facet bilateral lumbar #2    NERVE BLOCK N/A 4 9 14    cerv #1    NERVE BLOCK N/A 2014    cervical epidural  #2    NERVE BLOCK N/A  14    cerv #3    OTHER SURGICAL HISTORY  14    Radiofrequency right lumbar L3-4, L4-5, L5-S1    OTHER SURGICAL HISTORY Left 2014    lumbar radiofrequency    UPPER GASTROINTESTINAL ENDOSCOPY  14    ashly ,bx sm bowel  Dr Paxton Caldwell       Current Outpatient Medications:     diclofenac 1 % CREA, Apply 2 g topically 4 times daily To affected area, Disp: 120 g, Rfl: 3    levothyroxine (SYNTHROID) 50 MCG tablet, Take 1 tablet by mouth Daily, Disp: 90 tablet, Rfl: 2    atenolol (TENORMIN) 50 MG tablet, Take 1 tablet by mouth nightly, Disp: 90 tablet, Rfl: 2    simvastatin (ZOCOR) 20 MG tablet, Take 1 tablet by mouth nightly, Disp: 90 tablet, Rfl: 1    tiZANidine (ZANAFLEX) 4 MG tablet, Take 1 tablet by mouth every 8 hours as needed (MUSCLE SPASMS), Disp: 90 tablet, Rfl: 0    omeprazole (PRILOSEC) 20 MG delayed release capsule, Take 1 capsule by mouth daily, Disp: 90 capsule, Rfl: 2    hydrOXYzine (VISTARIL) 25 MG capsule, Take 25 mg by mouth 4 times daily as needed for Anxiety, Disp: , Rfl:     traZODone (DESYREL) 100 MG tablet, Take 1 tablet by mouth nightly, Disp: 90 tablet, Rfl: 1    naproxen (NAPROSYN) 500 MG tablet, Take 1 tablet by mouth 2 times daily (with meals), Disp: 180 tablet, Rfl: 1    sertraline (ZOLOFT) 100 MG tablet, Take 1 tablet by mouth daily Indications: takes 2 tablets at hs Take 2 tablet at hs po (Patient taking differently: Take 100 mg by mouth nightly Indications: takes 2 tablets at hs ), Disp: 180 tablet, Rfl: 3    famotidine (PEPCID) 20 MG tablet, Take 1 tablet by mouth 2 times daily, Disp: 180 tablet, Rfl: 1    Cholecalciferol (VITAMIN D-3) 5000 UNITS TABS, Take  by mouth., Disp: , Rfl:   Allergies   Allergen Reactions    Mobic [Meloxicam] Itching     Burning/Itching of there scalp, palms, back and feet.      Morphine Sulfate Itching    Percocet [Oxycodone-Acetaminophen] Itching    Vicodin [Hydrocodone-Acetaminophen] Itching     Social History     Socioeconomic History    Marital status:      Spouse name: Ambrosio    Number of children: 2    Years of education: 15    Highest education level: Not on file   Occupational History    Occupation: LPN   Social Needs    Financial resource strain: Not hard at all   [employer] insecurity     Worry: Never true     Inability: Never true    Transportation needs     Medical: No     Non-medical: No   Tobacco Use    Smoking status: Never Smoker    Smokeless tobacco: Never Used   Substance and Sexual Activity    Alcohol use: Yes     Comment: occasionally    Drug use: No    Sexual activity: Yes     Partners: Male   Lifestyle    Physical activity     Days per week: 4 days     Minutes per session: 30 min    Stress: Not at all   Relationships    Social connections Talks on phone: More than three times a week     Gets together: More than three times a week     Attends [employer] service: More than 4 times per year     Active member of club or organization: No     Attends meetings of clubs or organizations: Never     Relationship status:     Intimate partner violence     Fear of current or ex partner: No     Emotionally abused: No     Physically abused: No     Forced sexual activity: No   Other Topics Concern    Not on file   Social History Narrative    Patient lives at home with . She is independent with ADL's, and does require the use of an assistive device. Family History   Problem Relation Age of Onset    Heart Disease Brother         MI with stents    Other Brother         Frontal lobe lesion    Heart Disease Maternal Grandfather     Heart Disease Paternal Grandfather     Cancer Other     Depression Other     Mental Illness Other     Cancer Mother         breast with bone and liver mets    Bipolar Disorder Father     Other Father         Aspiration Pneumonia    Hypertension Sister     Depression Sister     Anxiety Disorder Sister        Review of Systems:     Skin: (-) rash,(-) psoriasis,(-) eczema, (-)skin cancer. Musculoskeletal: (-) fractures,  (-) dislocations,(-) collagen vascular disease, (-) fibromyalgia, (-) multiple sclerosis, (-) muscular dystrophy, (-) RSD,(-) joint pain (-)swelling, (-) joint pain,swelling. Neurologic: (-) epilepsy, (-)seizures,(-) brain tumor,(-) TIA, (-)stroke, (-)headaches, (-)Parkinson disease,(-) memory loss, (-) LOC. Cardiovascular: (-) Chest pain, (-) swelling in legs/feet, (-) SOB, (-) cramping in legs/feet with walking. Constitutional:  The patient is alert and oriented x 3, appears to be stated age and in no distress. Ht 5' 7\" (1.702 m)   Wt 270 lb (122.5 kg)   LMP 10/13/2016   BMI 42.29 kg/m²     Skin:  Upon inspection: the skin appears warm, dry and intact.   There is not a previous scar over the affected area. There is not any cellulitis, lymphedema or cutaneous lesions noted in the lower extremities. Upon palpation there is no induration noted. Neurologic:  Gait: normal;  Motor exam of the lower extremities show ; quadriceps, hamstrings, foot dorsi and plantar flexors intact R.  5/5 and L. 5/5. Deep tendon reflexes are 2/4 at the knees and 2/4 at the ankles with strong extensor hallicus longus motor strength bilaterally. Sensory to both feet is intact to all sensory roots. Cardiovascular: The vascular exam is normal and is well perfused to distal extremities. Distal pulses DP/PT: R. 2+; L. 2+. There is cap refill noted less than two seconds in all digits. There is not edema of the bilateral lower extremities. There is not varicosities noted in the distal extremities. Lymph:  Upon palpation,  there is no lymphadenopathy noted in bilateral lower extremities. Musculoskeletal:  Gait: antalgic; examination of the nails and digits reveal no cyanosis or clubbing    Lumbar exam:  On visual inspection, there is no deformity of the spine. full range of motion, no tenderness, palpable spasm or pain on motion. Special tests: Straight Leg Raise negative, Albino testnegative. Hip exam:  Upon inspection, there is no deformity noted. Upon palpation there is not tenderness. ROM: is   full and semetrical.   Strength: Hip Flexors 5/5; Hip Abductors 5/5; Hip Adduction 5/5. Knee exam:  Bilateral knee exam shows;  range of motion of R. Knee is 0 to 110, and L. Knee is 0 to 110. She does have  pain on motion, effusion is mild, there is tenderness over the  medial region, there are not any masses, there is not ligamentous instability, there is not  deformity noted. Knee exam: bilateral positive for moderate crepitations, some mild tenderness laxity is not noted with  stress.       R. Knee:  Lachman's negative, Anterior Drawer negative, Posterior Drawer negative  Gloria's negative, Thallasy  negative,   PF grind test negative, Apprehension test negative, Patellar J sign  negative  L. Knee:  Lachman's negative, Anterior Drawer negative, Posterior Drawer negative  Gloria's negative, Thallasy  negative,   PF grind test negative, Apprehension test negative,  Patellar J sign  negative    Xrays:   n/a    MRI:   n/a  Radiographic findings reviewed with patient    Impression:  Encounter Diagnoses   Name Primary?  Primary osteoarthritis of right knee Yes    Primary osteoarthritis of left knee      Plan:   Natural history and expected course discussed. Questions answered. Educational materials distributed. Rest, ice, compression, and elevation (RICE) therapy. Reduction in offending activity. Patellar compression sleeve. I had a lengthy discussion with the patient regarding their diagnosis. I explained treatment options including surgical vs non surgical treatment. I reviewed in detail the risks and benefits and outlined the procedure in detail with expected outcomes and possible complications. I also discussed non surgical treatment such as injections (CSI and visco supplementation), physical therapy, topical creams and NSAID's. They have elected for conservative management at this time. I will proceed with a cortisone injection in the Bilateral knees. Verbal and written consent was obtained for the injections. Skin was prepped with alcohol. 1 ml of Kenalog 40mg and 9 ml of 0.25% Marcaine was  injected to Bilateral knees. The injections were given through the lateral side of the knees. The patient tolerated the injections well. I will see the patient back prn   The patient has failed conservative measures such as NSAIDS, HEP, and cortisone injections. She is an excellent candidate for viscous supplementation injections including euflexxa in the Bilateral knee.    We will contact the patient's insurance company and see them back in the office once we have received approval.

## 2020-08-26 ENCOUNTER — NURSE ONLY (OUTPATIENT)
Dept: ORTHOPEDIC SURGERY | Age: 60
End: 2020-08-26
Payer: COMMERCIAL

## 2020-08-26 PROCEDURE — 20610 DRAIN/INJ JOINT/BURSA W/O US: CPT | Performed by: NURSE PRACTITIONER

## 2020-08-26 NOTE — PROGRESS NOTES
Chief Complaint   Patient presents with    Injections     bilateral visco-3 #1       Verbal and written consent was obtained by the patient. The following is a well known patient to me that is here for visco #1. her knees were prepped in sterile fashion. Visco 16 mg ml injected into the bilateral knees . The patient tolerated the injections well and I will see the patient back in 1 weeks time for repeat injections. Luda Solitario was seen today for injections.     Diagnoses and all orders for this visit:    Primary osteoarthritis of right knee  -     20610 - [place] DRAIN/INJECT LARGE JOINT/BURSA    Primary osteoarthritis of left knee  -     91831 - [place] DRAIN/INJECT LARGE JOINT/BURSA    Other orders  -     Discontinue: triamcinolone acetonide (ZILRETTA) intra-articular injection 32 mg  -     Discontinue: triamcinolone acetonide (ZILRETTA) intra-articular injection 32 mg  -     Discontinue: Hylan injection 16 mg  -     Discontinue: Hylan injection 16 mg  -     sodium hyaluronate (SUPARTZ) injection 25 mg  -     sodium hyaluronate (SUPARTZ) injection 25 mg

## 2020-09-03 ENCOUNTER — NURSE ONLY (OUTPATIENT)
Dept: ORTHOPEDIC SURGERY | Age: 60
End: 2020-09-03
Payer: COMMERCIAL

## 2020-09-03 PROCEDURE — 20610 DRAIN/INJ JOINT/BURSA W/O US: CPT | Performed by: NURSE PRACTITIONER

## 2020-09-08 ENCOUNTER — OFFICE VISIT (OUTPATIENT)
Dept: ORTHOPEDIC SURGERY | Age: 60
End: 2020-09-08
Payer: COMMERCIAL

## 2020-09-08 PROCEDURE — 20610 DRAIN/INJ JOINT/BURSA W/O US: CPT | Performed by: ORTHOPAEDIC SURGERY

## 2020-09-14 NOTE — PROGRESS NOTES
Chief Complaint   Patient presents with    Injections     bilateral visco 3 #2       Verbal and written consent was obtained by the patient. The following is a well known patient to me that is here for visco #2. her knees were prepped in sterile fashion. Visco 16 mg ml injected into the bilateral knees . The patient tolerated the injections well and I will see the patient back in 1 weeks time for repeat injections. Melida Pollack was seen today for injections.     Diagnoses and all orders for this visit:    Primary osteoarthritis of right knee  -     20610 - [place] DRAIN/INJECT LARGE JOINT/BURSA    Primary osteoarthritis of left knee  -     69601 - [place] DRAIN/INJECT LARGE JOINT/BURSA    Other orders  -     sodium hyaluronate (SUPARTZ) injection 25 mg  -     sodium hyaluronate (SUPARTZ) injection 25 mg

## 2020-10-05 ENCOUNTER — TELEPHONE (OUTPATIENT)
Dept: ORTHOPEDIC SURGERY | Age: 60
End: 2020-10-05

## 2020-10-05 NOTE — TELEPHONE ENCOUNTER
Patient called and states she finished her Visco 3 injections on 9/8/2020 with minimal relief on right knee. Wants to know if she can do Charles Ramirez. Please advise.

## 2020-10-11 ENCOUNTER — PATIENT MESSAGE (OUTPATIENT)
Dept: ORTHOPEDIC SURGERY | Age: 60
End: 2020-10-11

## 2020-10-12 RX ORDER — CELECOXIB 200 MG/1
200 CAPSULE ORAL DAILY
Qty: 30 CAPSULE | Refills: 3 | Status: SHIPPED
Start: 2020-10-12 | End: 2020-10-29

## 2020-10-21 ENCOUNTER — HOSPITAL ENCOUNTER (OUTPATIENT)
Age: 60
Discharge: HOME | End: 2020-10-23
Payer: COMMERCIAL

## 2020-10-21 ENCOUNTER — NURSE ONLY (OUTPATIENT)
Dept: ORTHOPEDIC SURGERY | Age: 60
End: 2020-10-21
Payer: COMMERCIAL

## 2020-10-21 LAB
ALBUMIN SERPL-MCNC: 4.5 G/DL (ref 3.5–5.2)
ALP BLD-CCNC: 56 U/L (ref 35–104)
ALT SERPL-CCNC: 14 U/L (ref 0–32)
ANION GAP SERPL CALCULATED.3IONS-SCNC: 18 MMOL/L (ref 7–16)
AST SERPL-CCNC: 18 U/L (ref 0–31)
BACTERIA: ABNORMAL /HPF
BASOPHILS ABSOLUTE: 0.1 E9/L (ref 0–0.2)
BASOPHILS RELATIVE PERCENT: 1.2 % (ref 0–2)
BILIRUB SERPL-MCNC: 0.4 MG/DL (ref 0–1.2)
BILIRUBIN URINE: NEGATIVE
BLOOD, URINE: NEGATIVE
BUN BLDV-MCNC: 12 MG/DL (ref 6–20)
CALCIUM SERPL-MCNC: 9.4 MG/DL (ref 8.6–10.2)
CHLORIDE BLD-SCNC: 102 MMOL/L (ref 98–107)
CHOLESTEROL, TOTAL: 174 MG/DL (ref 0–199)
CLARITY: CLEAR
CO2: 22 MMOL/L (ref 22–29)
COLOR: YELLOW
CREAT SERPL-MCNC: 0.8 MG/DL (ref 0.5–1)
CREATININE URINE: 60 MG/DL (ref 29–226)
EOSINOPHILS ABSOLUTE: 0.16 E9/L (ref 0.05–0.5)
EOSINOPHILS RELATIVE PERCENT: 1.9 % (ref 0–6)
GFR AFRICAN AMERICAN: >60
GFR NON-AFRICAN AMERICAN: >60 ML/MIN/1.73
GLUCOSE BLD-MCNC: 100 MG/DL (ref 74–99)
GLUCOSE URINE: NEGATIVE MG/DL
HBA1C MFR BLD: 5.6 % (ref 4–5.6)
HCT VFR BLD CALC: 42.6 % (ref 34–48)
HDLC SERPL-MCNC: 50 MG/DL
HEMOGLOBIN: 13.4 G/DL (ref 11.5–15.5)
IMMATURE GRANULOCYTES #: 0.04 E9/L
IMMATURE GRANULOCYTES %: 0.5 % (ref 0–5)
KETONES, URINE: NEGATIVE MG/DL
LDL CHOLESTEROL CALCULATED: 100 MG/DL (ref 0–99)
LEUKOCYTE ESTERASE, URINE: ABNORMAL
LYMPHOCYTES ABSOLUTE: 1.94 E9/L (ref 1.5–4)
LYMPHOCYTES RELATIVE PERCENT: 22.8 % (ref 20–42)
MAGNESIUM: 2.2 MG/DL (ref 1.6–2.6)
MCH RBC QN AUTO: 30.5 PG (ref 26–35)
MCHC RBC AUTO-ENTMCNC: 31.5 % (ref 32–34.5)
MCV RBC AUTO: 97 FL (ref 80–99.9)
MICROALBUMIN UR-MCNC: <12 MG/L
MICROALBUMIN/CREAT UR-RTO: ABNORMAL (ref 0–30)
MONOCYTES ABSOLUTE: 0.62 E9/L (ref 0.1–0.95)
MONOCYTES RELATIVE PERCENT: 7.3 % (ref 2–12)
NEUTROPHILS ABSOLUTE: 5.65 E9/L (ref 1.8–7.3)
NEUTROPHILS RELATIVE PERCENT: 66.3 % (ref 43–80)
NITRITE, URINE: NEGATIVE
PDW BLD-RTO: 12.9 FL (ref 11.5–15)
PH UA: 6 (ref 5–9)
PHOSPHORUS: 3.8 MG/DL (ref 2.5–4.5)
PLATELET # BLD: 272 E9/L (ref 130–450)
PMV BLD AUTO: 11.2 FL (ref 7–12)
POTASSIUM SERPL-SCNC: 4.5 MMOL/L (ref 3.5–5)
PROTEIN UA: NEGATIVE MG/DL
RBC # BLD: 4.39 E12/L (ref 3.5–5.5)
RBC UA: ABNORMAL /HPF (ref 0–2)
SEDIMENTATION RATE, ERYTHROCYTE: 27 MM/HR (ref 0–20)
SODIUM BLD-SCNC: 142 MMOL/L (ref 132–146)
SPECIFIC GRAVITY UA: 1.01 (ref 1–1.03)
T4 FREE: 1.09 NG/DL (ref 0.93–1.7)
TOTAL PROTEIN: 7.3 G/DL (ref 6.4–8.3)
TRIGL SERPL-MCNC: 119 MG/DL (ref 0–149)
TSH SERPL DL<=0.05 MIU/L-ACNC: 0.98 UIU/ML (ref 0.27–4.2)
UROBILINOGEN, URINE: 0.2 E.U./DL
VITAMIN D 25-HYDROXY: 27 NG/ML (ref 30–100)
VLDLC SERPL CALC-MCNC: 24 MG/DL
WBC # BLD: 8.5 E9/L (ref 4.5–11.5)
WBC UA: ABNORMAL /HPF (ref 0–5)

## 2020-10-21 PROCEDURE — 80061 LIPID PANEL: CPT

## 2020-10-21 PROCEDURE — 85651 RBC SED RATE NONAUTOMATED: CPT

## 2020-10-21 PROCEDURE — 83735 ASSAY OF MAGNESIUM: CPT

## 2020-10-21 PROCEDURE — 82570 ASSAY OF URINE CREATININE: CPT

## 2020-10-21 PROCEDURE — 20610 DRAIN/INJ JOINT/BURSA W/O US: CPT | Performed by: NURSE PRACTITIONER

## 2020-10-21 PROCEDURE — 85025 COMPLETE CBC W/AUTO DIFF WBC: CPT

## 2020-10-21 PROCEDURE — 84439 ASSAY OF FREE THYROXINE: CPT

## 2020-10-21 PROCEDURE — 84443 ASSAY THYROID STIM HORMONE: CPT

## 2020-10-21 PROCEDURE — 84100 ASSAY OF PHOSPHORUS: CPT

## 2020-10-21 PROCEDURE — 80053 COMPREHEN METABOLIC PANEL: CPT

## 2020-10-21 PROCEDURE — 83036 HEMOGLOBIN GLYCOSYLATED A1C: CPT

## 2020-10-21 PROCEDURE — 82306 VITAMIN D 25 HYDROXY: CPT

## 2020-10-21 PROCEDURE — 82044 UR ALBUMIN SEMIQUANTITATIVE: CPT

## 2020-10-21 PROCEDURE — 81001 URINALYSIS AUTO W/SCOPE: CPT

## 2020-10-21 NOTE — PROGRESS NOTES
Chief Complaint   Patient presents with    Injections     bilateral zilretta        I will proceed with a cortisone injection in the Bilateral knee. Verbal and written consent was obtained for the injections. The skin was prepped with alcohol. A prepared mixture of 32 mg of Zilretta and 5mL diluent was injected to Bilateral knee. The injection was given through the lateral side of the knee. The patient tolerated the injection well. I will see the patient back prn. Francesco Christine was seen today for injections.     Diagnoses and all orders for this visit:    Primary osteoarthritis of left knee  -     20610 - [place] DRAIN/INJECT LARGE JOINT/BURSA    Primary osteoarthritis of right knee  -     20610 - [place] DRAIN/INJECT LARGE JOINT/BURSA    Other orders  -     triamcinolone acetonide (ZILRETTA) intra-articular injection 32 mg  -     triamcinolone acetonide (ZILRETTA) intra-articular injection 32 mg

## 2020-10-27 ENCOUNTER — OFFICE VISIT (OUTPATIENT)
Dept: PHYSICAL MEDICINE AND REHAB | Age: 60
End: 2020-10-27
Payer: COMMERCIAL

## 2020-10-27 VITALS
SYSTOLIC BLOOD PRESSURE: 132 MMHG | HEIGHT: 66 IN | DIASTOLIC BLOOD PRESSURE: 64 MMHG | BODY MASS INDEX: 45.96 KG/M2 | WEIGHT: 286 LBS | HEART RATE: 54 BPM

## 2020-10-27 PROCEDURE — 99214 OFFICE O/P EST MOD 30 MIN: CPT | Performed by: PHYSICAL MEDICINE & REHABILITATION

## 2020-10-27 RX ORDER — TRAMADOL HYDROCHLORIDE 50 MG/1
50 TABLET ORAL DAILY PRN
Qty: 30 TABLET | Refills: 0 | Status: CANCELLED | OUTPATIENT
Start: 2020-10-27 | End: 2020-11-26

## 2020-10-27 NOTE — PROGRESS NOTES
Walter Mejia D.O. [facility]   [facility] [address] [facility] [address]  Phone: [phone]  Fax: [phone]        10/28/20    Chief Complaint   Patient presents with    Back Pain     Spinal Stenosis       HPI:  Jonah Morin is a 62y.o. year old woman seen today in follow up regarding low back pain. Interval history: Since the last visit the patient has had back pain for the last 15 years after no injury and wants to establish here for care now. She has had CATE and facet injections with RFA by Dr. Dieter Moran last in . She gets massage therapy and chiropractic treatment every 2 weeks. Today, the pain is rated Pain Score:   4 where 0 is no pain and 10 is pain as bad as it can be. The pain is located in the low back,  radiates distally to the bilateral legs, and is described as numb, spasm, shooting. This pain occurs intermittently. The symptoms have been worse since onset. Symptoms are exacerbated by working. Factors which relieve the pain include spasms, numbness. Other associated symptoms include none. Otherwise, the pain assessment has not changed since the last visit.      Past Medical History:   Diagnosis Date    Alopecia areata     Anxiety     Depression     Hyperlipidemia     Nausea & vomiting     Obesity, Class III, BMI 40-49.9 (morbid obesity) (HCC)     PVC (premature ventricular contraction)     Sleep apnea     CPAP    Thyroid disease        Past Surgical History:   Procedure Laterality Date     SECTION      x2    CHOLECYSTECTOMY      COLONOSCOPY  14    bx done Dr Armani Little  2011         KNEE ARTHROSCOPY      NERVE BLOCK Bilateral 3/5/14    lumbar facet #1    NERVE BLOCK  14    paravertebral facet bilateral lumbar #2    NERVE BLOCK N/A 14    cerv #1    NERVE BLOCK N/A 2014    cervical epidural  #2    NERVE BLOCK N/A 14    cerv #3    OTHER SURGICAL HISTORY 07/16/14    Radiofrequency right lumbar L3-4, L4-5, L5-S1    OTHER SURGICAL HISTORY Left 08 18 2014    lumbar radiofrequency    UPPER GASTROINTESTINAL ENDOSCOPY  12/16/14    ashly ,bx sm bowel  Dr Sonia Mendoza History     Tobacco Use    Smoking status: Never Smoker    Smokeless tobacco: Never Used   Substance Use Topics    Alcohol use: Yes     Comment: occasionally    Drug use: No       Family History   Problem Relation Age of Onset    Heart Disease Brother         MI with stents    Other Brother         Frontal lobe lesion    Heart Disease Maternal Grandfather     Heart Disease Paternal Grandfather     Cancer Other     Depression Other     Mental Illness Other     Cancer Mother         breast with bone and liver mets    Bipolar Disorder Father     Other Father         Aspiration Pneumonia    Hypertension Sister     Depression Sister     Anxiety Disorder Sister        Current Outpatient Medications   Medication Sig Dispense Refill    diclofenac (VOLTAREN) 50 MG EC tablet Take 1 tablet by mouth 2 times daily (with meals) 60 tablet 3    diclofenac 1 % CREA Apply 2 g topically 4 times daily To affected area 120 g 3    levothyroxine (SYNTHROID) 50 MCG tablet Take 1 tablet by mouth Daily 90 tablet 2    atenolol (TENORMIN) 50 MG tablet Take 1 tablet by mouth nightly 90 tablet 2    simvastatin (ZOCOR) 20 MG tablet Take 1 tablet by mouth nightly 90 tablet 1    tiZANidine (ZANAFLEX) 4 MG tablet Take 1 tablet by mouth every 8 hours as needed (MUSCLE SPASMS) 90 tablet 0    omeprazole (PRILOSEC) 20 MG delayed release capsule Take 1 capsule by mouth daily 90 capsule 2    hydrOXYzine (VISTARIL) 25 MG capsule Take 25 mg by mouth 4 times daily as needed for Anxiety      traZODone (DESYREL) 100 MG tablet Take 1 tablet by mouth nightly 90 tablet 1    naproxen (NAPROSYN) 500 MG tablet Take 1 tablet by mouth 2 times daily (with meals) 180 tablet 1    sertraline (ZOLOFT) 100 MG tablet Take 1 tablet by mouth daily Indications: takes 2 tablets at hs Take 2 tablet at hs po (Patient taking differently: Take 100 mg by mouth nightly Indications: takes 2 tablets at hs ) 180 tablet 3    Cholecalciferol (VITAMIN D-3) 5000 UNITS TABS Take  by mouth.  celecoxib (CELEBREX) 200 MG capsule Take 1 capsule by mouth daily (Patient not taking: Reported on 10/27/2020) 30 capsule 3    famotidine (PEPCID) 20 MG tablet Take 1 tablet by mouth 2 times daily (Patient not taking: Reported on 10/27/2020) 180 tablet 1     Current Facility-Administered Medications   Medication Dose Route Frequency Provider Last Rate Last Dose    sodium hyaluronate (SUPARTZ) injection 25 mg  25 mg Intra-articular Weekly Sherman Sachs, DO   25 mg at 09/08/20 1006    sodium hyaluronate (SUPARTZ) injection 25 mg  25 mg Intra-articular Weekly Sherman Sachs, DO   25 mg at 09/08/20 1005       Allergies   Allergen Reactions    Mobic [Meloxicam] Itching     Burning/Itching of there scalp, palms, back and feet.  Morphine Sulfate Itching    Percocet [Oxycodone-Acetaminophen] Itching    Vicodin [Hydrocodone-Acetaminophen] Itching       Review of Systems:  No new weakness, paresthesia, incontinence of bowel or bladder, saddle anesthesia, falls or gait dysfunction. Otherwise, per HPI. Physical Exam:   Blood pressure 132/64, pulse 54, height 5' 6\" (1.676 m), weight 286 lb (129.7 kg), last menstrual period 10/13/2016, not currently breastfeeding. GENERAL: The patient is in no apparent distress. Body habitus is obese. HEENT: No rhinorrhea, sneezing, yawning, or lacrimation. No scleral icterus or conjunctival injection. SKIN: No piloerection. No tract marks. No rash. PSYCH: Mood and affect are appropriate. Hygiene is appropriate. CARDIOVASCULAR  Heart is regular rate and rhythm. There is no edema. RESPIRATORY: Respirations are regular and unlabored. There is no cyanosis. GASTROINTESTINAL: Soft abdomen, non-tender.   MSK: There is no joint effusion, deformity, instability, swelling, erythema or warmth. AROM is full in the spine and extremities. Spinal curvatures are normal.      NEURO: Gait is normal. No focal sensorimotor deficit. Reflexes 2+ and symmetric in lower extremities. Impression:   1. Chronic bilateral low back pain with bilateral sciatica    2. Spondylolisthesis at L4-L5 level    3. Lumbar spondylosis    4. Sacroiliac dysfunction        Plan:  Orders Placed This Encounter   Procedures    MRI LUMBAR SPINE WO CONTRAST     Standing Status:   Future     Standing Expiration Date:   10/28/2021    XR HIP BILATERAL W AP PELVIS (2 VIEWS)     Standing Status:   Future     Number of Occurrences:   1     Standing Expiration Date:   10/28/2021     Order Specific Question:   Reason for exam:     Answer:   hip pain    XR SACROILIAC JOINTS (MIN 3 VIEWS)     Standing Status:   Future     Number of Occurrences:   1     Standing Expiration Date:   10/27/2021    External Referral To Orthotics     Referral Priority:   Routine     Referral Type:   Eval and Treat     Referral Reason:   Specialty Services Required     Requested Specialty:   Orthotics     Number of Visits Requested:   1       The patient was educated about the diagnosis, prognosis, indications, risks and benefits of treatment. An opportunity to ask questions was given to the patient and questions were answered. The patient agreed to proceed with the recommended treatment as described above. Return in about 6 weeks (around 12/8/2020). Thank you for allowing me to participate in the care of your patient. Val Hernandez D.O., P.T.   Board Certified Physical Medicine and Rehabilitation  Board Certified Electrodiagnostic Medicine

## 2020-10-28 ENCOUNTER — TELEPHONE (OUTPATIENT)
Dept: PHYSICAL MEDICINE AND REHAB | Age: 60
End: 2020-10-28

## 2020-10-28 NOTE — TELEPHONE ENCOUNTER
----- Message from Jesús Smith DO sent at 10/28/2020  9:38 AM EDT -----  Reviewed test abnormal, inform patient that it showed a slip of L4 on L5 which worsens (is unstable) when you bend forward. There are also degenerative changes in the lumbar spine, sacroiliac joint and hips. Recommend lumbar brace.

## 2020-10-29 NOTE — TELEPHONE ENCOUNTER
Called and spoke with the patient to inform her that it is ok to go to chiropractor and I will fax the order for lumbar brace to  in [place]. Patient is aware and voice understanding.

## 2020-12-14 ENCOUNTER — HOSPITAL ENCOUNTER (OUTPATIENT)
Dept: GENERAL RADIOLOGY | Age: 60
Discharge: HOME | End: 2020-12-16
Payer: COMMERCIAL

## 2020-12-14 ENCOUNTER — OFFICE VISIT (OUTPATIENT)
Dept: PHYSICAL MEDICINE AND REHAB | Age: 60
End: 2020-12-14
Payer: COMMERCIAL

## 2020-12-14 VITALS
SYSTOLIC BLOOD PRESSURE: 130 MMHG | WEIGHT: 292 LBS | HEIGHT: 66 IN | BODY MASS INDEX: 46.93 KG/M2 | HEART RATE: 72 BPM | DIASTOLIC BLOOD PRESSURE: 68 MMHG

## 2020-12-14 PROCEDURE — 77063 BREAST TOMOSYNTHESIS BI: CPT

## 2020-12-14 PROCEDURE — 99214 OFFICE O/P EST MOD 30 MIN: CPT | Performed by: PHYSICAL MEDICINE & REHABILITATION

## 2020-12-14 NOTE — PROGRESS NOTES
08 18 2014    lumbar radiofrequency    UPPER GASTROINTESTINAL ENDOSCOPY  12/16/14    ashly ,bx sm bowel  Dr Mitali Saavedra History     Tobacco Use    Smoking status: Never Smoker    Smokeless tobacco: Never Used   Substance Use Topics    Alcohol use: Yes     Comment: occasionally    Drug use: No       Family History   Problem Relation Age of Onset    Heart Disease Brother         MI with stents    Other Brother         Frontal lobe lesion    Heart Disease Maternal Grandfather     Heart Disease Paternal Grandfather     Cancer Other     Depression Other     Mental Illness Other     Cancer Mother         breast with bone and liver mets    Bipolar Disorder Father     Other Father         Aspiration Pneumonia    Hypertension Sister     Depression Sister     Anxiety Disorder Sister        Current Outpatient Medications   Medication Sig Dispense Refill    diclofenac (VOLTAREN) 50 MG EC tablet Take 1 tablet by mouth 2 times daily (with meals) 60 tablet 3    famotidine (PEPCID) 20 MG tablet Take 1 tablet by mouth 2 times daily 180 tablet 1    diclofenac 1 % CREA Apply 2 g topically 4 times daily To affected area 120 g 3    tiZANidine (ZANAFLEX) 4 MG tablet Take 1 tablet by mouth every 8 hours as needed (MUSCLE SPASMS) 90 tablet 1    traZODone (DESYREL) 100 MG tablet Take 1 tablet by mouth nightly 90 tablet 1    levothyroxine (SYNTHROID) 50 MCG tablet Take 1 tablet by mouth Daily 90 tablet 2    atenolol (TENORMIN) 50 MG tablet Take 1 tablet by mouth nightly 90 tablet 2    simvastatin (ZOCOR) 20 MG tablet Take 1 tablet by mouth nightly 90 tablet 1    omeprazole (PRILOSEC) 20 MG delayed release capsule Take 1 capsule by mouth daily 90 capsule 2    hydrOXYzine (VISTARIL) 25 MG capsule Take 25 mg by mouth 4 times daily as needed for Anxiety      naproxen (NAPROSYN) 500 MG tablet Take 1 tablet by mouth 2 times daily (with meals) 180 tablet 1    sertraline (ZOLOFT) 100 MG tablet Take 1 tablet by mouth daily Indications: takes 2 tablets at hs Take 2 tablet at hs po (Patient taking differently: Take 100 mg by mouth nightly Indications: takes 2 tablets at hs ) 180 tablet 3    Cholecalciferol (VITAMIN D-3) 5000 UNITS TABS Take  by mouth. Current Facility-Administered Medications   Medication Dose Route Frequency Provider Last Rate Last Admin    sodium hyaluronate (SUPARTZ) injection 25 mg  25 mg Intra-articular Weekly Rosalie Arms, DO   25 mg at 09/08/20 1006    sodium hyaluronate (SUPARTZ) injection 25 mg  25 mg Intra-articular Weekly Rosalie Arms, DO   25 mg at 09/08/20 1005       Allergies   Allergen Reactions    Mobic [Meloxicam] Itching     Burning/Itching of there scalp, palms, back and feet.  Morphine Sulfate Itching    Percocet [Oxycodone-Acetaminophen] Itching    Vicodin [Hydrocodone-Acetaminophen] Itching       Review of Systems:  No new weakness, paresthesia, incontinence of bowel or bladder, saddle anesthesia, falls or gait dysfunction. Otherwise, per HPI. Physical Exam:   Blood pressure 130/68, pulse 72, height 5' 6\" (1.676 m), weight 292 lb (132.5 kg), last menstrual period 10/13/2016, not currently breastfeeding. GENERAL: The patient is in no apparent distress. Body habitus is obese. There is no joint effusion, deformity, instability, swelling, erythema or warmth. AROM is full in the spine and extremities. Spinal curvatures are normal.    Bilateral paraspinal lumbar tenderness. Back pain increases with lumbar extension. Negative SLR. Neurologic:  No focal sensorimotor deficit. Reflexes 2+ and symmetric. Gait is normal.    Impression:   1. Facet arthropathy, lumbar    2. Chronic bilateral low back pain with bilateral sciatica    3. Spondylolisthesis at L4-L5 level    4.  Sacroiliac dysfunction        Plan:  Orders Placed This Encounter   Procedures    [place] INJ DX/THER AGNT PARAVERT FACET JOINT, LUMBAR/SAC, 1ST LEVEL     Bilateral intra-articular facet joint injection with fluoroscopic guidance at L3-4, L4-5,L5-S1 by Dr. Lonnie Holt lumbar orthosis  Continue home exercises. Continue current medicatiosn. The patient was educated about the diagnosis, prognosis, indications, risks and benefits of treatment. An opportunity to ask questions was given to the patient and questions were answered. The patient agreed to proceed with the recommended treatment as described above. Follow up 2-3 weeks after facet injections. Thank you for allowing me to participate in the care of your patient. Lebron Walker D.O., P.T.   Board Certified Physical Medicine and Rehabilitation  Board Certified Electrodiagnostic Medicine

## 2020-12-15 ENCOUNTER — PREP FOR PROCEDURE (OUTPATIENT)
Dept: PHYSICAL MEDICINE AND REHAB | Age: 60
End: 2020-12-15

## 2020-12-15 ENCOUNTER — HOSPITAL ENCOUNTER (OUTPATIENT)
Age: 60
Discharge: HOME | End: 2020-12-15
Payer: COMMERCIAL

## 2020-12-15 PROCEDURE — U0003 INFECTIOUS AGENT DETECTION BY NUCLEIC ACID (DNA OR RNA); SEVERE ACUTE RESPIRATORY SYNDROME CORONAVIRUS 2 (SARS-COV-2) (CORONAVIRUS DISEASE [COVID-19]), AMPLIFIED PROBE TECHNIQUE, MAKING USE OF HIGH THROUGHPUT TECHNOLOGIES AS DESCRIBED BY CMS-2020-01-R: HCPCS

## 2020-12-17 LAB
SARS-COV-2: NOT DETECTED
SOURCE: NORMAL

## 2020-12-21 ENCOUNTER — HOSPITAL ENCOUNTER (OUTPATIENT)
Dept: OPERATING ROOM | Age: 60
Setting detail: OUTPATIENT SURGERY
Discharge: HOME | End: 2020-12-21
Attending: PHYSICAL MEDICINE & REHABILITATION
Payer: COMMERCIAL

## 2020-12-21 ENCOUNTER — HOSPITAL ENCOUNTER (OUTPATIENT)
Age: 60
Setting detail: OUTPATIENT SURGERY
Discharge: HOME | End: 2020-12-21
Attending: PHYSICAL MEDICINE & REHABILITATION | Admitting: PHYSICAL MEDICINE & REHABILITATION
Payer: COMMERCIAL

## 2020-12-21 VITALS
DIASTOLIC BLOOD PRESSURE: 53 MMHG | OXYGEN SATURATION: 96 % | SYSTOLIC BLOOD PRESSURE: 122 MMHG | RESPIRATION RATE: 16 BRPM | HEART RATE: 61 BPM | BODY MASS INDEX: 45.8 KG/M2 | WEIGHT: 285 LBS | HEIGHT: 66 IN | TEMPERATURE: 97.3 F

## 2020-12-21 PROCEDURE — 64493 INJ PARAVERT F JNT L/S 1 LEV: CPT | Performed by: PHYSICAL MEDICINE & REHABILITATION

## 2020-12-21 PROCEDURE — 64495 INJ PARAVERT F JNT L/S 3 LEV: CPT | Performed by: PHYSICAL MEDICINE & REHABILITATION

## 2020-12-21 PROCEDURE — 3209999900 FLUORO FOR SURGICAL PROCEDURES

## 2020-12-21 PROCEDURE — 3600000005 HC SURGERY LEVEL 5 BASE: Performed by: PHYSICAL MEDICINE & REHABILITATION

## 2020-12-21 PROCEDURE — 2500000003 HC RX 250 WO HCPCS: Performed by: PHYSICAL MEDICINE & REHABILITATION

## 2020-12-21 PROCEDURE — 2709999900 HC NON-CHARGEABLE SUPPLY: Performed by: PHYSICAL MEDICINE & REHABILITATION

## 2020-12-21 PROCEDURE — 7100000011 HC PHASE II RECOVERY - ADDTL 15 MIN: Performed by: PHYSICAL MEDICINE & REHABILITATION

## 2020-12-21 PROCEDURE — 6360000002 HC RX W HCPCS: Performed by: PHYSICAL MEDICINE & REHABILITATION

## 2020-12-21 PROCEDURE — 64494 INJ PARAVERT F JNT L/S 2 LEV: CPT | Performed by: PHYSICAL MEDICINE & REHABILITATION

## 2020-12-21 PROCEDURE — 3600000015 HC SURGERY LEVEL 5 ADDTL 15MIN: Performed by: PHYSICAL MEDICINE & REHABILITATION

## 2020-12-21 PROCEDURE — 7100000010 HC PHASE II RECOVERY - FIRST 15 MIN: Performed by: PHYSICAL MEDICINE & REHABILITATION

## 2020-12-21 RX ORDER — LIDOCAINE HYDROCHLORIDE 10 MG/ML
INJECTION, SOLUTION EPIDURAL; INFILTRATION; INTRACAUDAL; PERINEURAL PRN
Status: DISCONTINUED | OUTPATIENT
Start: 2020-12-21 | End: 2020-12-21 | Stop reason: ALTCHOICE

## 2020-12-21 ASSESSMENT — PAIN SCALES - GENERAL
PAINLEVEL_OUTOF10: 0
PAINLEVEL_OUTOF10: 0

## 2020-12-21 ASSESSMENT — PAIN - FUNCTIONAL ASSESSMENT: PAIN_FUNCTIONAL_ASSESSMENT: 0-10

## 2020-12-21 ASSESSMENT — PAIN DESCRIPTION - DESCRIPTORS: DESCRIPTORS: DISCOMFORT

## 2020-12-21 NOTE — H&P
MG capsule Take 25 mg by mouth 4 times daily as needed for Anxiety      naproxen (NAPROSYN) 500 MG tablet Take 1 tablet by mouth 2 times daily (with meals) 180 tablet 1    sertraline (ZOLOFT) 100 MG tablet Take 1 tablet by mouth daily Indications: takes 2 tablets at hs Take 2 tablet at hs po (Patient taking differently: Take 100 mg by mouth nightly Indications: takes 2 tablets at hs ) 180 tablet 3    Cholecalciferol (VITAMIN D-3) 5000 UNITS TABS Take  by mouth.          Past Medical History:   Diagnosis Date    Alopecia areata     Anxiety     Depression     Hyperlipidemia     Nausea & vomiting     Obesity, Class III, BMI 40-49.9 (morbid obesity) (HCC)     Osteoarthritis     PVC (premature ventricular contraction)     Sleep apnea     CPAP    Thyroid disease        Past Surgical History:   Procedure Laterality Date     SECTION      x2    CHOLECYSTECTOMY      COLONOSCOPY  14    bx done Dr Jacob Sanchez  2011         KNEE ARTHROSCOPY      NERVE BLOCK Bilateral 3/5/14    lumbar facet #1    NERVE BLOCK  14    paravertebral facet bilateral lumbar #2    NERVE BLOCK N/A 4 9 14    cerv #1    NERVE BLOCK N/A 2014    cervical epidural  #2    NERVE BLOCK N/A  14    cerv #3    OTHER SURGICAL HISTORY  14    Radiofrequency right lumbar L3-4, L4-5, L5-S1    OTHER SURGICAL HISTORY Left 2014    lumbar radiofrequency    UPPER GASTROINTESTINAL ENDOSCOPY  14    ashly ,bx sm bowel  Dr Solitario Romero       Family History   Problem Relation Age of Onset    Heart Disease Brother         MI with stents    Other Brother         Frontal lobe lesion    Heart Disease Maternal Grandfather     Heart Disease Paternal Grandfather     Cancer Other     Depression Other     Mental Illness Other     Cancer Mother         breast with bone and liver mets    Bipolar Disorder Father     Other Father         Aspiration Pneumonia    Hypertension Sister    Freddiejoleen Pichardobrady Depression Sister     Anxiety Disorder Sister             ROS:    Constitutional: Denies fevers, chills, night sweats, unintentional weight loss     Skin: Denies rash or skin changes     Respiratory: Denies SOB or cough     Cardiovascular: Denies CP, palpitations, edema      Neurologic: See HPI.     MSK: See HPI. Hematologic/Lymphatic/Immunologic: Denies bruising       Physical Exam:   Height 5' 6\" (1.676 m), weight 285 lb (129.3 kg), last menstrual period 10/13/2016, not currently breastfeeding. General: well developed and well nourished in no acute distress  Resp: symmetrical chest expansion, unlabored breathing, respirations unlabored. CV: Heart rate is regular. Peripheral pulses are palpable  Skin: No rashes or ecchymosis. Normal turgor. MSK: ttp lumbar psps     Neurological Exam:  No focal sensorimotor deficits. Reflexes 2+ and symmetric.        Impression:     Lumbar facet arthropathy      Plan:   · Injection as planned today           Yovana Ray DO, [facility]   Board Certified Physical Medicine and Rehabilitation

## 2020-12-21 NOTE — OP NOTE
LUMBAR FACET JOINT INTRA-ARTICULAR INJECTION(S)      WITH FLUOROSCOPIC GUIDANCE     (Zygopophyseal Joint Injection)      Patient: Michael Cronin                                                    MRN: [id number]  : 1960                                              Date of procedure: 2020    Physician Performing Procedure: Rafa Zaman DO     Clinical Scenario: As per our office notes. Diagnosis:   1. Facet arthropathy, lumbar        Injectate: A total of 6cc, consisting of 2cc of Kenalog (40mg/cc), the remainder comprised of 2% lidocaine without epinephrine. Levels Treated: bilateral L3-4, L4-5, L5-S1 Facet Joints    Approach: Posterior oblique     Improvement after today's procedure: As per nursing record. Comments: None    Procedure: The patient was prepped and draped in a sterile fashion in the prone position after informed consent was signed and all patient questions were answered including the risks, benefits, alternative treatment options, and prognosis. The risks include but are not limited to infection, allergic reaction, nerve damage, paralysis, epidural hematoma, syncope, headache, pneumothorax, respiratory or cardiac arrest, and scar formation. The region overlying the above mentioned facet joints was localized under fluoroscopic visualization. A 3-4 cc. volume of 1% Lidocaine without Epinephrine was injected for local anesthesia. A 22 gauge, 5 inch spinal needle was inserted into each of the above mentioned facet joints. One cc of the steroid/anesthetic solution was then injected into each of the facet joints noted above. The patient tolerated the procedure well and was discharged after an appropriate period of observation. If there are any complications, the patient was instructed to call us. The patient is to folIow-up with the requesting physician within 4-6 weeks.

## 2021-01-13 ENCOUNTER — OFFICE VISIT (OUTPATIENT)
Dept: ORTHOPEDIC SURGERY | Age: 61
End: 2021-01-13
Payer: COMMERCIAL

## 2021-01-13 VITALS — WEIGHT: 285 LBS | BODY MASS INDEX: 45.8 KG/M2 | TEMPERATURE: 98.6 F | HEIGHT: 66 IN

## 2021-01-13 DIAGNOSIS — M17.12 PRIMARY OSTEOARTHRITIS OF LEFT KNEE: Primary | ICD-10-CM

## 2021-01-13 DIAGNOSIS — M17.11 PRIMARY OSTEOARTHRITIS OF RIGHT KNEE: ICD-10-CM

## 2021-01-13 PROCEDURE — 20610 DRAIN/INJ JOINT/BURSA W/O US: CPT | Performed by: NURSE PRACTITIONER

## 2021-01-13 PROCEDURE — 99213 OFFICE O/P EST LOW 20 MIN: CPT | Performed by: NURSE PRACTITIONER

## 2021-01-13 RX ORDER — TRIAMCINOLONE ACETONIDE 40 MG/ML
40 INJECTION, SUSPENSION INTRA-ARTICULAR; INTRAMUSCULAR ONCE
Status: COMPLETED | OUTPATIENT
Start: 2021-01-13 | End: 2021-01-13

## 2021-01-13 RX ADMIN — TRIAMCINOLONE ACETONIDE 40 MG: 40 INJECTION, SUSPENSION INTRA-ARTICULAR; INTRAMUSCULAR at 11:03

## 2021-01-13 NOTE — PROGRESS NOTES
Chief Complaint   Patient presents with    Knee Pain     B/L knee pain has returned. Zilretta injections lasted for about 2 months. They worked really well until then. Would like cortisone injections done today. Jaylon Billingsley returns today for follow-up of her bilateral knee pain. She had zilretta almost 3 months ago with good relief up until recently.     Past Medical History:   Diagnosis Date    Alopecia areata     Anxiety     Depression     Hyperlipidemia     Nausea & vomiting     Obesity, Class III, BMI 40-49.9 (morbid obesity) (HCC)     Osteoarthritis     PVC (premature ventricular contraction)     Sleep apnea     CPAP    Thyroid disease      Past Surgical History:   Procedure Laterality Date     SECTION      x2    CHOLECYSTECTOMY      COLONOSCOPY  14    bx done Dr Chucky Mayen  2011         KNEE ARTHROSCOPY      NERVE BLOCK Bilateral 3/5/14    lumbar facet #1    NERVE BLOCK  14    paravertebral facet bilateral lumbar #2    NERVE BLOCK N/A 14    cerv #1    NERVE BLOCK N/A 2014    cervical epidural  #2    NERVE BLOCK N/A 14    cerv #3    NERVE BLOCK Bilateral 2020    intra-articular facet joint injection     OTHER SURGICAL HISTORY  14    Radiofrequency right lumbar L3-4, L4-5, L5-S1    OTHER SURGICAL HISTORY Left 2014    lumbar radiofrequency    PAIN MANAGEMENT PROCEDURE Bilateral 2020    BILATERAL INTRA-ARTICULAR FACET JOINT INJECTION WITH FLUOROSCOPIC GUIDANCE AT L3-4, L4-5, L5-S1 (CPT W6326655, [id number]) performed by Sachin Mccabe DO at [address]  14    ashly ,bx sm bowel  Dr Ronald Johnson       Current Outpatient Medications:     diclofenac 1 % CREA, Apply 2 g topically 4 times daily To affected area, Disp: 120 g, Rfl: 3    tiZANidine (ZANAFLEX) 4 MG tablet, Take 1 tablet by mouth every 8 hours as needed (MUSCLE SPASMS), Disp: 90 tablet, Rfl: 1   famotidine (PEPCID) 20 MG tablet, Take 1 tablet by mouth 2 times daily, Disp: 180 tablet, Rfl: 1    atenolol (TENORMIN) 50 MG tablet, Take 1 tablet by mouth nightly, Disp: 90 tablet, Rfl: 2    levothyroxine (SYNTHROID) 50 MCG tablet, Take 1 tablet by mouth Daily, Disp: 90 tablet, Rfl: 2    sertraline (ZOLOFT) 100 MG tablet, Take 1 tablet by mouth daily Indications: takes 2 tablets at hs Take 2 tablet at hs po, Disp: 180 tablet, Rfl: 3    simvastatin (ZOCOR) 20 MG tablet, Take 1 tablet by mouth nightly, Disp: 90 tablet, Rfl: 1    diclofenac (VOLTAREN) 50 MG EC tablet, Take 1 tablet by mouth 2 times daily (with meals), Disp: 60 tablet, Rfl: 3    traZODone (DESYREL) 100 MG tablet, Take 1 tablet by mouth nightly, Disp: 90 tablet, Rfl: 1    omeprazole (PRILOSEC) 20 MG delayed release capsule, Take 1 capsule by mouth daily, Disp: 90 capsule, Rfl: 2    hydrOXYzine (VISTARIL) 25 MG capsule, Take 25 mg by mouth 4 times daily as needed for Anxiety, Disp: , Rfl:     naproxen (NAPROSYN) 500 MG tablet, Take 1 tablet by mouth 2 times daily (with meals), Disp: 180 tablet, Rfl: 1    Cholecalciferol (VITAMIN D-3) 5000 UNITS TABS, Take  by mouth., Disp: , Rfl:   Allergies   Allergen Reactions    Mobic [Meloxicam] Itching     Burning/Itching of there scalp, palms, back and feet.      Morphine Sulfate Itching    Percocet [Oxycodone-Acetaminophen] Itching    Vicodin [Hydrocodone-Acetaminophen] Itching     Social History     Socioeconomic History    Marital status:      Spouse name: Ambrosio    Number of children: 2    Years of education: 15    Highest education level: Not on file   Occupational History    Occupation: LPN   Social Needs    Financial resource strain: Not hard at all   Wilbert-Maria R insecurity     Worry: Never true     Inability: Never true    Transportation needs     Medical: No     Non-medical: No   Tobacco Use    Smoking status: Never Smoker    Smokeless tobacco: Never Used Substance and Sexual Activity    Alcohol use: Yes     Comment: occasionally    Drug use: No    Sexual activity: Yes     Partners: Male   Lifestyle    Physical activity     Days per week: 4 days     Minutes per session: 30 min    Stress: Not at all   Relationships    Social connections     Talks on phone: More than three times a week     Gets together: More than three times a week     Attends [employer] service: More than 4 times per year     Active member of club or organization: No     Attends meetings of clubs or organizations: Never     Relationship status:     Intimate partner violence     Fear of current or ex partner: No     Emotionally abused: No     Physically abused: No     Forced sexual activity: No   Other Topics Concern    Not on file   Social History Narrative    Patient lives at home with . She is independent with ADL's, and does require the use of an assistive device. Family History   Problem Relation Age of Onset    Heart Disease Brother         MI with stents    Other Brother         Frontal lobe lesion    Heart Disease Maternal Grandfather     Heart Disease Paternal Grandfather     Cancer Other     Depression Other     Mental Illness Other     Cancer Mother         breast with bone and liver mets    Bipolar Disorder Father     Other Father         Aspiration Pneumonia    Hypertension Sister     Depression Sister     Anxiety Disorder Sister        Review of Systems:     Skin: (-) rash,(-) psoriasis,(-) eczema, (-)skin cancer. Musculoskeletal: (-) fractures,  (-) dislocations,(-) collagen vascular disease, (-) fibromyalgia, (-) multiple sclerosis, (-) muscular dystrophy, (-) RSD,(-) joint pain (-)swelling, (-) joint pain,swelling. Neurologic: (-) epilepsy, (-)seizures,(-) brain tumor,(-) TIA, (-)stroke, (-)headaches, (-)Parkinson disease,(-) memory loss, (-) LOC. Cardiovascular: (-) Chest pain, (-) swelling in legs/feet, (-) SOB, (-) cramping in legs/feet with walking. Constitutional:  The patient is alert and oriented x 3, appears to be stated age and in no distress. Temp 98.6 °F (37 °C)   Ht 5' 6\" (1.676 m)   Wt 285 lb (129.3 kg)   LMP 10/13/2016   BMI 46.00 kg/m²     Skin:  Upon inspection: the skin appears warm, dry and intact. There is not a previous scar over the affected area. There is not any cellulitis, lymphedema or cutaneous lesions noted in the lower extremities. Upon palpation there is no induration noted. Neurologic:  Gait: normal;  Motor exam of the lower extremities show ; quadriceps, hamstrings, foot dorsi and plantar flexors intact R.  5/5 and L. 5/5. Deep tendon reflexes are 2/4 at the knees and 2/4 at the ankles with strong extensor hallicus longus motor strength bilaterally. Sensory to both feet is intact to all sensory roots. Cardiovascular: The vascular exam is normal and is well perfused to distal extremities. Distal pulses DP/PT: R. 2+; L. 2+. There is cap refill noted less than two seconds in all digits. There is not edema of the bilateral lower extremities. There is not varicosities noted in the distal extremities. Lymph:  Upon palpation,  there is no lymphadenopathy noted in bilateral lower extremities. Musculoskeletal:  Gait: antalgic; examination of the nails and digits reveal no cyanosis or clubbing    Lumbar exam:  On visual inspection, there is no deformity of the spine. full range of motion, no tenderness, palpable spasm or pain on motion. Special tests: Straight Leg Raise negative, Albino testnegative. Hip exam:  Upon inspection, there is no deformity noted. Upon palpation there is not tenderness. ROM: is   full and semetrical.   Strength: Hip Flexors 5/5; Hip Abductors 5/5; Hip Adduction 5/5.      Knee exam: Bilateral knee exam shows;  range of motion of R. Knee is 0 to 110, and L. Knee is 0 to 110. She does have  pain on motion, effusion is mild, there is tenderness over the  medial region, there are not any masses, there is not ligamentous instability, there is not  deformity noted. Knee exam: bilateral positive for moderate crepitations, some mild tenderness laxity is not noted with  stress. R. Knee:  Lachman's negative, Anterior Drawer negative, Posterior Drawer negative  Gloria's negative, Thallasy  negative,   PF grind test negative, Apprehension test negative, Patellar J sign  negative  L. Knee:  Lachman's negative, Anterior Drawer negative, Posterior Drawer negative  Gloria's negative, Thallasy  negative,   PF grind test negative, Apprehension test negative,  Patellar J sign  negative    Xrays:   n/a    MRI:   n/a  Radiographic findings reviewed with patient    Impression:  Encounter Diagnoses   Name Primary?  Primary osteoarthritis of left knee Yes    Primary osteoarthritis of right knee      Plan:   Natural history and expected course discussed. Questions answered. Educational materials distributed. Rest, ice, compression, and elevation (RICE) therapy. Reduction in offending activity. Patellar compression sleeve. I had a lengthy discussion with the patient regarding their diagnosis. I explained treatment options including surgical vs non surgical treatment. I reviewed in detail the risks and benefits and outlined the procedure in detail with expected outcomes and possible complications. I also discussed non surgical treatment such as injections (CSI and visco supplementation), physical therapy, topical creams and NSAID's. They have elected for conservative management at this time. I will proceed with a cortisone injection in the Bilateral knees. Verbal and written consent was obtained for the injections. Skin was prepped with alcohol. 1 ml of Kenalog 40mg and 9 ml of 0.25% Marcaine was  injected to Bilateral knees. The injections were given through the lateral side of the knees. The patient tolerated the injections well. I will see the patient back prn    The patient has failed conservative measures such as NSAIDS, HEP, and cortisone injections. She is an excellent candidate for Zilretta injections  in the Bilateral knee.  We will contact the patient's insurance company and see them back in the office once we have received approval.

## 2021-02-01 ENCOUNTER — OFFICE VISIT (OUTPATIENT)
Dept: PHYSICAL MEDICINE AND REHAB | Age: 61
End: 2021-02-01
Payer: COMMERCIAL

## 2021-02-01 VITALS
WEIGHT: 285 LBS | DIASTOLIC BLOOD PRESSURE: 68 MMHG | BODY MASS INDEX: 45.8 KG/M2 | SYSTOLIC BLOOD PRESSURE: 140 MMHG | HEIGHT: 66 IN | HEART RATE: 68 BPM

## 2021-02-01 DIAGNOSIS — M43.16 SPONDYLOLISTHESIS AT L4-L5 LEVEL: Primary | ICD-10-CM

## 2021-02-01 DIAGNOSIS — G89.29 CHRONIC BILATERAL LOW BACK PAIN WITH BILATERAL SCIATICA: ICD-10-CM

## 2021-02-01 DIAGNOSIS — M47.816 FACET ARTHROPATHY, LUMBAR: ICD-10-CM

## 2021-02-01 DIAGNOSIS — M54.42 CHRONIC BILATERAL LOW BACK PAIN WITH BILATERAL SCIATICA: ICD-10-CM

## 2021-02-01 DIAGNOSIS — M53.3 SACROILIAC DYSFUNCTION: ICD-10-CM

## 2021-02-01 DIAGNOSIS — M54.41 CHRONIC BILATERAL LOW BACK PAIN WITH BILATERAL SCIATICA: ICD-10-CM

## 2021-02-01 PROCEDURE — 99214 OFFICE O/P EST MOD 30 MIN: CPT | Performed by: PHYSICAL MEDICINE & REHABILITATION

## 2021-02-01 RX ORDER — TRAMADOL HYDROCHLORIDE 50 MG/1
50 TABLET ORAL EVERY 6 HOURS PRN
Qty: 60 TABLET | Refills: 0 | Status: SHIPPED | OUTPATIENT
Start: 2021-02-01 | End: 2021-03-03

## 2021-02-01 NOTE — PROGRESS NOTES
Felicitas Solano D.O. [facility]   [address] [facility] [facility] [place]  Phone: [phone]  Fax: [phone]        21    Chief Complaint   Patient presents with    Back Pain     FOLLOW UP       HPI:  Eugene Camacho is a 62y.o. year old woman seen today in follow up regarding low back pain. Interval history: Since the last visit the patient  Had bilateral L3-4, L4-5 and L5-S1 facet joint injections on 20. She experienced 20% relief from the injection for a few days. She has continued with massage and chiropractic every 2 weeks. She is using the brace during the day. Today, the pain is rated Pain Score:   0 - No pain where 0 is no pain and 10 is pain as bad as it can be. The pain is located in the low back currently not radiating and is described as aching, tight, burning. This pain occurs intermittently. The symptoms have been better since onset. Symptoms are exacerbated by walking. Factors which relieve the pain include back brace, chiropractic. Other associated symptoms include st.  Otherwise, the pain assessment has not changed since the last visit.      Past Medical History:   Diagnosis Date    Alopecia areata     Anxiety     Depression     Hyperlipidemia     Nausea & vomiting     Obesity, Class III, BMI 40-49.9 (morbid obesity) (HCC)     Osteoarthritis     PVC (premature ventricular contraction)     Sleep apnea     CPAP    Thyroid disease        Past Surgical History:   Procedure Laterality Date     SECTION      x2    CHOLECYSTECTOMY      COLONOSCOPY  14    bx done Dr Negron Fees  2011         KNEE ARTHROSCOPY      NERVE BLOCK Bilateral 3/5/14    lumbar facet #1    NERVE BLOCK  14    paravertebral facet bilateral lumbar #2    NERVE BLOCK N/A 4 9 14    cerv #1    NERVE BLOCK N/A 2014    cervical epidural  #2    NERVE BLOCK N/A 4  14    cerv #3    NERVE BLOCK Bilateral 12/21/2020    intra-articular facet joint injection     OTHER SURGICAL HISTORY  07/16/14    Radiofrequency right lumbar L3-4, L4-5, L5-S1    OTHER SURGICAL HISTORY Left 08 18 2014    lumbar radiofrequency    PAIN MANAGEMENT PROCEDURE Bilateral 12/21/2020    BILATERAL INTRA-ARTICULAR FACET JOINT INJECTION WITH FLUOROSCOPIC GUIDANCE AT L3-4, L4-5, L5-S1 (CPT 23413, 85887) performed by Hang Haddad DO at [address]  12/16/14    ashly ,bx sm bowel  Dr Mónica Manley History     Tobacco Use    Smoking status: Never Smoker    Smokeless tobacco: Never Used   Substance Use Topics    Alcohol use: Yes     Comment: occasionally    Drug use: No       Family History   Problem Relation Age of Onset    Heart Disease Brother         MI with stents    Other Brother         Frontal lobe lesion    Heart Disease Maternal Grandfather     Heart Disease Paternal Grandfather     Cancer Other     Depression Other     Mental Illness Other     Cancer Mother         breast with bone and liver mets    Bipolar Disorder Father     Other Father         Aspiration Pneumonia    Hypertension Sister     Depression Sister     Anxiety Disorder Sister        Current Outpatient Medications   Medication Sig Dispense Refill    diclofenac sodium (VOLTAREN) 1 % GEL       traMADol (ULTRAM) 50 MG tablet Take 1 tablet by mouth every 6 hours as needed for Pain for up to 30 days.  60 tablet 0    diclofenac 1 % CREA Apply 2 g topically 4 times daily To affected area 120 g 3    tiZANidine (ZANAFLEX) 4 MG tablet Take 1 tablet by mouth every 8 hours as needed (MUSCLE SPASMS) 90 tablet 1    famotidine (PEPCID) 20 MG tablet Take 1 tablet by mouth 2 times daily 180 tablet 1    atenolol (TENORMIN) 50 MG tablet Take 1 tablet by mouth nightly 90 tablet 2    levothyroxine (SYNTHROID) 50 MCG tablet Take 1 tablet by mouth Daily 90 tablet 2    sertraline (ZOLOFT) 100 MG tablet Take 1 tablet by mouth daily Indications: takes 2 tablets at hs Take 2 tablet at hs po 180 tablet 3    diclofenac (VOLTAREN) 50 MG EC tablet Take 1 tablet by mouth 2 times daily (with meals) 60 tablet 3    traZODone (DESYREL) 100 MG tablet Take 1 tablet by mouth nightly 90 tablet 1    hydrOXYzine (VISTARIL) 25 MG capsule Take 25 mg by mouth 4 times daily as needed for Anxiety      naproxen (NAPROSYN) 500 MG tablet Take 1 tablet by mouth 2 times daily (with meals) 180 tablet 1    Cholecalciferol (VITAMIN D-3) 5000 UNITS TABS Take  by mouth. Current Facility-Administered Medications   Medication Dose Route Frequency Provider Last Rate Last Admin    sodium hyaluronate (SUPARTZ) injection 25 mg  25 mg Intra-articular Weekly Jono Loya, DO   25 mg at 09/08/20 1006    sodium hyaluronate (SUPARTZ) injection 25 mg  25 mg Intra-articular Weekly Jono Loya, DO   25 mg at 09/08/20 1005       Allergies   Allergen Reactions    Mobic [Meloxicam] Itching     Burning/Itching of there scalp, palms, back and feet.  Morphine Sulfate Itching    Percocet [Oxycodone-Acetaminophen] Itching    Vicodin [Hydrocodone-Acetaminophen] Itching       Review of Systems:  No new weakness, paresthesia, incontinence of bowel or bladder, saddle anesthesia, falls or gait dysfunction. Otherwise, per HPI. Physical Exam:   Blood pressure (!) 140/68, pulse 68, height 5' 6\" (1.676 m), weight 285 lb (129.3 kg), last menstrual period 10/13/2016, not currently breastfeeding. GENERAL: The patient is in no apparent distress. Body habitus is obese. There is no joint effusion, deformity, instability, swelling, erythema or warmth. AROM is full in the spine and extremities. Spinal curvatures are normal.    Bilateral paraspinal lumbar tenderness. Back pain increases with lumbar extension. Negative SLR. Neurologic:  No focal sensorimotor deficit. Reflexes 2+ and symmetric. Gait is normal.    Impression:   1.  Spondylolisthesis at L4-L5 level 2. Chronic bilateral low back pain with bilateral sciatica    3. Facet arthropathy, lumbar    4. Sacroiliac dysfunction        Plan:  Discussed RFA. Wants to hold off for now. Continue chiropractic  Continue lumbar orthosis  Continue home exercises. Continue current medications. Orders Placed This Encounter   Medications    traMADol (ULTRAM) 50 MG tablet     Sig: Take 1 tablet by mouth every 6 hours as needed for Pain for up to 30 days. Dispense:  60 tablet     Refill:  0     Reduce doses taken as pain becomes manageable     Controlled Substance Monitoring:    Acute and Chronic Pain Monitoring:   RX Monitoring 2/1/2021   Attestation -   Periodic Controlled Substance Monitoring No signs of potential drug abuse or diversion identified. Chronic Pain > 50 MEDD -     The patient was educated about the diagnosis, prognosis, indications, risks and benefits of treatment. An opportunity to ask questions was given to the patient and questions were answered. The patient agreed to proceed with the recommended treatment as described above. Follow up 3 months  Thank you for allowing me to participate in the care of your patient. Abdi Salazar D.O., P.T.   Board Certified Physical Medicine and Rehabilitation  Board Certified Electrodiagnostic Medicine

## 2021-02-04 DIAGNOSIS — R73.09 OTHER ABNORMAL GLUCOSE: ICD-10-CM

## 2021-02-04 DIAGNOSIS — R53.83 FATIGUE, UNSPECIFIED TYPE: ICD-10-CM

## 2021-02-04 DIAGNOSIS — E07.9 THYROID DISEASE: ICD-10-CM

## 2021-02-04 DIAGNOSIS — E55.9 VITAMIN D DEFICIENCY: ICD-10-CM

## 2021-02-04 DIAGNOSIS — E78.2 MIXED HYPERLIPIDEMIA: ICD-10-CM

## 2021-02-05 LAB
ALBUMIN SERPL-MCNC: 4.7 G/DL (ref 3.5–5.2)
ALP BLD-CCNC: 67 U/L (ref 35–104)
ALT SERPL-CCNC: 24 U/L (ref 0–32)
ANION GAP SERPL CALCULATED.3IONS-SCNC: 15 MMOL/L (ref 7–16)
AST SERPL-CCNC: 21 U/L (ref 0–31)
BASOPHILS ABSOLUTE: 0.07 E9/L (ref 0–0.2)
BASOPHILS RELATIVE PERCENT: 0.7 % (ref 0–2)
BILIRUB SERPL-MCNC: 0.3 MG/DL (ref 0–1.2)
BUN BLDV-MCNC: 12 MG/DL (ref 6–20)
CALCIUM SERPL-MCNC: 9.6 MG/DL (ref 8.6–10.2)
CHLORIDE BLD-SCNC: 104 MMOL/L (ref 98–107)
CHOLESTEROL, TOTAL: 216 MG/DL (ref 0–199)
CO2: 24 MMOL/L (ref 22–29)
CREAT SERPL-MCNC: 0.8 MG/DL (ref 0.5–1)
EOSINOPHILS ABSOLUTE: 0.16 E9/L (ref 0.05–0.5)
EOSINOPHILS RELATIVE PERCENT: 1.6 % (ref 0–6)
GFR AFRICAN AMERICAN: >60
GFR NON-AFRICAN AMERICAN: >60 ML/MIN/1.73
GLUCOSE BLD-MCNC: 88 MG/DL (ref 74–99)
HBA1C MFR BLD: 5.7 % (ref 4–5.6)
HCT VFR BLD CALC: 45.5 % (ref 34–48)
HDLC SERPL-MCNC: 44 MG/DL
HEMOGLOBIN: 14.4 G/DL (ref 11.5–15.5)
IMMATURE GRANULOCYTES #: 0.07 E9/L
IMMATURE GRANULOCYTES %: 0.7 % (ref 0–5)
LDL CHOLESTEROL CALCULATED: 124 MG/DL (ref 0–99)
LYMPHOCYTES ABSOLUTE: 2.08 E9/L (ref 1.5–4)
LYMPHOCYTES RELATIVE PERCENT: 21.2 % (ref 20–42)
MCH RBC QN AUTO: 31 PG (ref 26–35)
MCHC RBC AUTO-ENTMCNC: 31.6 % (ref 32–34.5)
MCV RBC AUTO: 98.1 FL (ref 80–99.9)
MONOCYTES ABSOLUTE: 0.71 E9/L (ref 0.1–0.95)
MONOCYTES RELATIVE PERCENT: 7.2 % (ref 2–12)
NEUTROPHILS ABSOLUTE: 6.73 E9/L (ref 1.8–7.3)
NEUTROPHILS RELATIVE PERCENT: 68.6 % (ref 43–80)
PDW BLD-RTO: 13.3 FL (ref 11.5–15)
PLATELET # BLD: 313 E9/L (ref 130–450)
PMV BLD AUTO: 11 FL (ref 7–12)
POTASSIUM SERPL-SCNC: 4.7 MMOL/L (ref 3.5–5)
RBC # BLD: 4.64 E12/L (ref 3.5–5.5)
SODIUM BLD-SCNC: 143 MMOL/L (ref 132–146)
TOTAL PROTEIN: 7.5 G/DL (ref 6.4–8.3)
TRIGL SERPL-MCNC: 241 MG/DL (ref 0–149)
TSH SERPL DL<=0.05 MIU/L-ACNC: 0.85 UIU/ML (ref 0.27–4.2)
VITAMIN D 25-HYDROXY: 36 NG/ML (ref 30–100)
VLDLC SERPL CALC-MCNC: 48 MG/DL
WBC # BLD: 9.8 E9/L (ref 4.5–11.5)

## 2021-02-24 ENCOUNTER — NURSE ONLY (OUTPATIENT)
Dept: ORTHOPEDIC SURGERY | Age: 61
End: 2021-02-24
Payer: COMMERCIAL

## 2021-02-24 DIAGNOSIS — M17.11 PRIMARY OSTEOARTHRITIS OF RIGHT KNEE: ICD-10-CM

## 2021-02-24 DIAGNOSIS — M17.12 PRIMARY OSTEOARTHRITIS OF LEFT KNEE: Primary | ICD-10-CM

## 2021-02-24 PROCEDURE — 20610 DRAIN/INJ JOINT/BURSA W/O US: CPT | Performed by: NURSE PRACTITIONER

## 2021-02-24 NOTE — PROGRESS NOTES
Chief Complaint   Patient presents with    Injections     bilateral zilretta        I will proceed with a cortisone injection in the Bilateral knee. Verbal and written consent was obtained for the injections. The skin was prepped with alcohol. A prepared mixture of 32 mg of Zilretta and 5mL diluent was injected to Bilateral knee. The injection was given through the lateral side of the knee. The patient tolerated the injection well. I will see the patient back prn. Petrona Lopez was seen today for injections.     Diagnoses and all orders for this visit:    Primary osteoarthritis of left knee  -     20610 - [place] DRAIN/INJECT LARGE JOINT/BURSA    Primary osteoarthritis of right knee  -     20610 - [place] DRAIN/INJECT LARGE JOINT/BURSA    Other orders  -     triamcinolone acetonide (ZILRETTA) intra-articular injection 32 mg  -     triamcinolone acetonide (ZILRETTA) intra-articular injection 32 mg

## 2021-03-04 ENCOUNTER — TELEPHONE (OUTPATIENT)
Dept: ORTHOPEDIC SURGERY | Age: 61
End: 2021-03-04

## 2021-03-25 ENCOUNTER — OFFICE VISIT (OUTPATIENT)
Dept: ORTHOPEDIC SURGERY | Age: 61
End: 2021-03-25
Payer: COMMERCIAL

## 2021-03-25 VITALS — WEIGHT: 271 LBS | TEMPERATURE: 98 F | HEIGHT: 66 IN | BODY MASS INDEX: 43.55 KG/M2

## 2021-03-25 DIAGNOSIS — M17.11 PRIMARY OSTEOARTHRITIS OF RIGHT KNEE: Primary | ICD-10-CM

## 2021-03-25 DIAGNOSIS — M17.12 PRIMARY OSTEOARTHRITIS OF LEFT KNEE: ICD-10-CM

## 2021-03-25 PROCEDURE — 99214 OFFICE O/P EST MOD 30 MIN: CPT | Performed by: ORTHOPAEDIC SURGERY

## 2021-03-25 RX ORDER — ACETAMINOPHEN AND CODEINE PHOSPHATE 60; 300 MG/1; MG/1
1 TABLET ORAL EVERY 6 HOURS PRN
Qty: 28 TABLET | Refills: 0 | Status: SHIPPED | OUTPATIENT
Start: 2021-03-25 | End: 2021-04-01

## 2021-03-25 NOTE — PROGRESS NOTES
Rfl: 1    diclofenac 1 % CREA, Apply 2 g topically 4 times daily To affected area, Disp: 120 g, Rfl: 3    tiZANidine (ZANAFLEX) 4 MG tablet, Take 1 tablet by mouth every 8 hours as needed (MUSCLE SPASMS), Disp: 90 tablet, Rfl: 1    famotidine (PEPCID) 20 MG tablet, Take 1 tablet by mouth 2 times daily, Disp: 180 tablet, Rfl: 1    atenolol (TENORMIN) 50 MG tablet, Take 1 tablet by mouth nightly, Disp: 90 tablet, Rfl: 2    levothyroxine (SYNTHROID) 50 MCG tablet, Take 1 tablet by mouth Daily, Disp: 90 tablet, Rfl: 2    sertraline (ZOLOFT) 100 MG tablet, Take 1 tablet by mouth daily Indications: takes 2 tablets at hs Take 2 tablet at hs po, Disp: 180 tablet, Rfl: 3    diclofenac (VOLTAREN) 50 MG EC tablet, Take 1 tablet by mouth 2 times daily (with meals), Disp: 60 tablet, Rfl: 3    hydrOXYzine (VISTARIL) 25 MG capsule, Take 25 mg by mouth 4 times daily as needed for Anxiety, Disp: , Rfl:     naproxen (NAPROSYN) 500 MG tablet, Take 1 tablet by mouth 2 times daily (with meals), Disp: 180 tablet, Rfl: 1    Cholecalciferol (VITAMIN D-3) 5000 UNITS TABS, Take  by mouth., Disp: , Rfl:   Allergies   Allergen Reactions    Mobic [Meloxicam] Itching     Burning/Itching of there scalp, palms, back and feet.      Morphine Sulfate Itching    Percocet [Oxycodone-Acetaminophen] Itching    Vicodin [Hydrocodone-Acetaminophen] Itching     Social History     Socioeconomic History    Marital status:      Spouse name: Ambrosio    Number of children: 2    Years of education: 15    Highest education level: Not on file   Occupational History    Occupation: LPN   Social Needs    Financial resource strain: Not hard at all   [employer] insecurity     Worry: Never true     Inability: Never true    Transportation needs     Medical: No     Non-medical: No   Tobacco Use    Smoking status: Never Smoker    Smokeless tobacco: Never Used   Substance and Sexual Activity    Alcohol use: Yes     Comment: occasionally    Drug use: No    Sexual activity: Yes     Partners: Male   Lifestyle    Physical activity     Days per week: 4 days     Minutes per session: 30 min    Stress: Not at all   Relationships    Social connections     Talks on phone: More than three times a week     Gets together: More than three times a week     Attends Sabianist service: More than 4 times per year     Active member of club or organization: No     Attends meetings of clubs or organizations: Never     Relationship status:     Intimate partner violence     Fear of current or ex partner: No     Emotionally abused: No     Physically abused: No     Forced sexual activity: No   Other Topics Concern    Not on file   Social History Narrative    Patient lives at home with . She is independent with ADL's, and does require the use of an assistive device. Family History   Problem Relation Age of Onset    Heart Disease Brother         MI with stents    Other Brother         Frontal lobe lesion    Heart Disease Maternal Grandfather     Heart Disease Paternal Grandfather     Cancer Other     Depression Other     Mental Illness Other     Cancer Mother         breast with bone and liver mets    Bipolar Disorder Father     Other Father         Aspiration Pneumonia    Hypertension Sister     Depression Sister     Anxiety Disorder Sister        Review of Systems:     Skin: (-) rash,(-) psoriasis,(-) eczema, (-)skin cancer. Musculoskeletal: (-) fractures,  (-) dislocations,(-) collagen vascular disease, (-) fibromyalgia, (-) multiple sclerosis, (-) muscular dystrophy, (-) RSD,(-) joint pain (-)swelling, (-) joint pain,swelling. Neurologic: (-) epilepsy, (-)seizures,(-) brain tumor,(-) TIA, (-)stroke, (-)headaches, (-)Parkinson disease,(-) memory loss, (-) LOC. Cardiovascular: (-) Chest pain, (-) swelling in legs/feet, (-) SOB, (-) cramping in legs/feet with walking.     Constitutional:  The patient is alert and oriented x 3, appears to be stated age and in no distress. Temp 98 °F (36.7 °C)   Ht 5' 6\" (1.676 m)   Wt 271 lb (122.9 kg)   LMP 10/13/2016   BMI 43.74 kg/m²     Skin:  Upon inspection: the skin appears warm, dry and intact. There is not a previous scar over the affected area. There is not any cellulitis, lymphedema or cutaneous lesions noted in the lower extremities. Upon palpation there is no induration noted. Neurologic:  Gait: normal;  Motor exam of the lower extremities show ; quadriceps, hamstrings, foot dorsi and plantar flexors intact R.  5/5 and L. 5/5. Deep tendon reflexes are 2/4 at the knees and 2/4 at the ankles with strong extensor hallicus longus motor strength bilaterally. Sensory to both feet is intact to all sensory roots. Cardiovascular: The vascular exam is normal and is well perfused to distal extremities. Distal pulses DP/PT: R. 2+; L. 2+. There is cap refill noted less than two seconds in all digits. There is not edema of the bilateral lower extremities. There is not varicosities noted in the distal extremities. Lymph:  Upon palpation,  there is no lymphadenopathy noted in bilateral lower extremities. Musculoskeletal:  Gait: antalgic; examination of the nails and digits reveal no cyanosis or clubbing    Lumbar exam:  On visual inspection, there is no deformity of the spine. full range of motion, no tenderness, palpable spasm or pain on motion. Special tests: Straight Leg Raise negative, Albino testnegative. Hip exam:  Upon inspection, there is no deformity noted. Upon palpation there is not tenderness. ROM: is   full and semetrical.   Strength: Hip Flexors 5/5; Hip Abductors 5/5; Hip Adduction 5/5. Knee exam:  Bilateral knee exam shows;  range of motion of R. Knee is 0 to 110, and L. Knee is 0 to 110.  She does have  pain on motion, effusion is mild, there is tenderness over the  medial region, there are not any masses, there is not ligamentous instability, there is not  deformity noted. Knee exam: bilateral positive for moderate crepitations, some mild tenderness laxity is not noted with  stress. R. Knee:  Lachman's negative, Anterior Drawer negative, Posterior Drawer negative  Gloria's negative, Thallasy  negative,   PF grind test negative, Apprehension test negative, Patellar J sign  negative  L. Knee:  Lachman's negative, Anterior Drawer negative, Posterior Drawer negative  Gloria's negative, Thallasy  negative,   PF grind test negative, Apprehension test negative,  Patellar J sign  negative    Xrays:     No fracture, cortical erosion or significant left knee effusion.       Degenerative osteo arthritic changes were noted, moderate to marked in the   patellofemoral and marked in the medial femoral tibial joint compartment.           MRI:   Not performed today. Radiographic findings reviewed with patient    Impression:  Encounter Diagnoses   Name Primary?  Primary osteoarthritis of right knee Yes    Primary osteoarthritis of left knee         Plan:   Natural history and expected course discussed. Questions answered. Educational materials distributed. Rest, ice, compression, and elevation (RICE) therapy. Reduction in offending activity. Patellar compression sleeve. I had a lengthy discussion with the patient regarding their diagnosis. I explained treatment options including surgical vs non surgical treatment. I reviewed in detail the risks and benefits and outlined the procedure in detail with expected outcomes and possible complications. I also discussed non surgical treatment such as injections (CSI and visco supplementation), physical therapy, topical creams and NSAID's. They have elected for surgical management at this time.    We discussed various treatment options both surgical and non-surgical.   The patient is unable to ambulate more than 100 feet and is unable to perform the average daily activities including:  Light housework, ADLs, donning clothes, toileting and exercise. Patient has failed previous conservative measures including cortisone injections, NSAIDs, PT, HEP and pain medication and is currently a fall risk to the disability and decreased functioning. The patient wishes to have the total knee arthroplasty. The risks and benefits of a total knee replacement were discussed with the patient. The risks include but are not limited to: infection, injury to blood vessels and nerves, non relief of symptoms, arthrofibrosis of knee, aseptic loosening of prosthesis, intraoperative fracture, blood loss, PE/DVT, MI, dislocation of hip and knee, need for further operative intervention and death. The patient is aware of the risks and wished to proceed with a Left total knee replacement 04/28/2021. We will obtain medical clearence from the PCP. The patient was counseled at length about the risks of brent Covid-19 during their perioperative period and any recovery window from their procedure. The patient was made aware that brent Covid-19  may worsen their prognosis for recovering from their procedure  and lend to a higher morbidity and/or mortality risk. All material risks, benefits, and reasonable alternatives including postponing the procedure were discussed. The patient does wish to proceed with the procedure at this time. At least 30 minutes was spent discussing the diagnosis and treatment options with the patient with at least 50% of the time was spent with decision making and counseling the patient.

## 2021-04-20 ASSESSMENT — KOOS JR
RISING FROM SITTING: 1
STRAIGHTENING KNEE FULLY: 0
HOW SEVERE IS YOUR KNEE STIFFNESS AFTER FIRST WAKING IN MORNING: 1
BENDING TO THE FLOOR TO PICK UP OBJECT: 1

## 2021-04-20 ASSESSMENT — PROMIS GLOBAL HEALTH SCALE
IN THE PAST 7 DAYS, HOW WOULD YOU RATE YOUR PAIN ON AVERAGE [ON A SCALE FROM 0 (NO PAIN) TO 10 (WORST IMAGINABLE PAIN)]?: 2
IN GENERAL, HOW WOULD YOU RATE YOUR MENTAL HEALTH, INCLUDING YOUR MOOD AND YOUR ABILITY TO THINK [ON A SCALE OF 1 (POOR) TO 5 (EXCELLENT)]?: 4
SUM OF RESPONSES TO QUESTIONS 2, 4, 5, & 10: 15
HOW IS THE PROMIS V1.1 BEING ADMINISTERED?: 2
TO WHAT EXTENT ARE YOU ABLE TO CARRY OUT YOUR EVERYDAY PHYSICAL ACTIVITIES SUCH AS WALKING, CLIMBING STAIRS, CARRYING GROCERIES, OR MOVING A CHAIR [ON A SCALE OF 1 (NOT AT ALL) TO 5 (COMPLETELY)]?: 5
IN THE PAST 7 DAYS, HOW OFTEN HAVE YOU BEEN BOTHERED BY EMOTIONAL PROBLEMS, SUCH AS FEELING ANXIOUS, DEPRESSED, OR IRRITABLE [ON A SCALE FROM 1 (NEVER) TO 5 (ALWAYS)]?: 3
IN GENERAL, WOULD YOU SAY YOUR QUALITY OF LIFE IS...[ON A SCALE OF 1 (POOR) TO 5 (EXCELLENT)]: 3
IN THE PAST 7 DAYS, HOW WOULD YOU RATE YOUR FATIGUE ON AVERAGE [ON A SCALE FROM 1 (NONE) TO 5 (VERY SEVERE)]?: 4

## 2021-04-22 RX ORDER — SODIUM CHLORIDE, SODIUM LACTATE, POTASSIUM CHLORIDE, CALCIUM CHLORIDE 600; 310; 30; 20 MG/100ML; MG/100ML; MG/100ML; MG/100ML
INJECTION, SOLUTION INTRAVENOUS CONTINUOUS
Status: CANCELLED | OUTPATIENT
Start: 2021-04-22

## 2021-04-23 ENCOUNTER — HOSPITAL ENCOUNTER (OUTPATIENT)
Age: 61
Discharge: HOME | End: 2021-04-25
Payer: COMMERCIAL

## 2021-04-23 ENCOUNTER — HOSPITAL ENCOUNTER (OUTPATIENT)
Dept: GENERAL RADIOLOGY | Age: 61
Discharge: HOME | End: 2021-04-25
Payer: COMMERCIAL

## 2021-04-23 ENCOUNTER — ANESTHESIA EVENT (OUTPATIENT)
Dept: OPERATING ROOM | Age: 61
End: 2021-04-23
Payer: COMMERCIAL

## 2021-04-23 ENCOUNTER — HOSPITAL ENCOUNTER (OUTPATIENT)
Dept: PREADMISSION TESTING | Age: 61
Discharge: HOME | End: 2021-04-23
Payer: COMMERCIAL

## 2021-04-23 VITALS
SYSTOLIC BLOOD PRESSURE: 137 MMHG | OXYGEN SATURATION: 97 % | WEIGHT: 273 LBS | DIASTOLIC BLOOD PRESSURE: 62 MMHG | HEIGHT: 66 IN | HEART RATE: 67 BPM | BODY MASS INDEX: 43.87 KG/M2 | TEMPERATURE: 97 F | RESPIRATION RATE: 18 BRPM

## 2021-04-23 DIAGNOSIS — M17.12 PRIMARY OSTEOARTHRITIS OF LEFT KNEE: ICD-10-CM

## 2021-04-23 DIAGNOSIS — Z01.818 PREOP TESTING: ICD-10-CM

## 2021-04-23 LAB
ABO/RH: NORMAL
ANION GAP SERPL CALCULATED.3IONS-SCNC: 11 MMOL/L (ref 7–16)
ANTIBODY SCREEN: NORMAL
APTT: 32 SEC (ref 24.5–35.1)
BACTERIA: ABNORMAL /HPF
BILIRUBIN URINE: NEGATIVE
BLOOD, URINE: NEGATIVE
BUN BLDV-MCNC: 10 MG/DL (ref 6–20)
CALCIUM SERPL-MCNC: 9.5 MG/DL (ref 8.6–10.2)
CHLORIDE BLD-SCNC: 102 MMOL/L (ref 98–107)
CLARITY: CLEAR
CO2: 28 MMOL/L (ref 22–29)
COLOR: YELLOW
CREAT SERPL-MCNC: 0.8 MG/DL (ref 0.5–1)
EPITHELIAL CELLS, UA: ABNORMAL /HPF
GFR AFRICAN AMERICAN: >60
GFR NON-AFRICAN AMERICAN: >60 ML/MIN/1.73
GLUCOSE BLD-MCNC: 104 MG/DL (ref 74–99)
GLUCOSE URINE: NEGATIVE MG/DL
HCT VFR BLD CALC: 42.9 % (ref 34–48)
HEMOGLOBIN: 13.6 G/DL (ref 11.5–15.5)
INR BLD: 1
KETONES, URINE: NEGATIVE MG/DL
LEUKOCYTE ESTERASE, URINE: ABNORMAL
MCH RBC QN AUTO: 30.8 PG (ref 26–35)
MCHC RBC AUTO-ENTMCNC: 31.7 % (ref 32–34.5)
MCV RBC AUTO: 97.1 FL (ref 80–99.9)
NITRITE, URINE: NEGATIVE
PDW BLD-RTO: 13.1 FL (ref 11.5–15)
PH UA: 5.5 (ref 5–9)
PLATELET # BLD: 253 E9/L (ref 130–450)
PMV BLD AUTO: 10.8 FL (ref 7–12)
POTASSIUM SERPL-SCNC: 4.2 MMOL/L (ref 3.5–5)
PREALBUMIN: 29 MG/DL (ref 20–40)
PROTEIN UA: NEGATIVE MG/DL
PROTHROMBIN TIME: 12.1 SEC (ref 9.3–12.4)
RBC # BLD: 4.42 E12/L (ref 3.5–5.5)
RBC UA: ABNORMAL /HPF (ref 0–2)
SODIUM BLD-SCNC: 141 MMOL/L (ref 132–146)
SPECIFIC GRAVITY UA: 1.01 (ref 1–1.03)
UROBILINOGEN, URINE: 0.2 E.U./DL
WBC # BLD: 7.7 E9/L (ref 4.5–11.5)
WBC UA: ABNORMAL /HPF (ref 0–5)

## 2021-04-23 PROCEDURE — 86901 BLOOD TYPING SEROLOGIC RH(D): CPT

## 2021-04-23 PROCEDURE — 86850 RBC ANTIBODY SCREEN: CPT

## 2021-04-23 PROCEDURE — U0003 INFECTIOUS AGENT DETECTION BY NUCLEIC ACID (DNA OR RNA); SEVERE ACUTE RESPIRATORY SYNDROME CORONAVIRUS 2 (SARS-COV-2) (CORONAVIRUS DISEASE [COVID-19]), AMPLIFIED PROBE TECHNIQUE, MAKING USE OF HIGH THROUGHPUT TECHNOLOGIES AS DESCRIBED BY CMS-2020-01-R: HCPCS

## 2021-04-23 PROCEDURE — 81001 URINALYSIS AUTO W/SCOPE: CPT

## 2021-04-23 PROCEDURE — 80048 BASIC METABOLIC PNL TOTAL CA: CPT

## 2021-04-23 PROCEDURE — 86900 BLOOD TYPING SEROLOGIC ABO: CPT

## 2021-04-23 PROCEDURE — 85027 COMPLETE CBC AUTOMATED: CPT

## 2021-04-23 PROCEDURE — 85730 THROMBOPLASTIN TIME PARTIAL: CPT

## 2021-04-23 PROCEDURE — 87186 SC STD MICRODIL/AGAR DIL: CPT

## 2021-04-23 PROCEDURE — 87077 CULTURE AEROBIC IDENTIFY: CPT

## 2021-04-23 PROCEDURE — 87081 CULTURE SCREEN ONLY: CPT

## 2021-04-23 PROCEDURE — 87088 URINE BACTERIA CULTURE: CPT

## 2021-04-23 PROCEDURE — 71046 X-RAY EXAM CHEST 2 VIEWS: CPT

## 2021-04-23 PROCEDURE — 84134 ASSAY OF PREALBUMIN: CPT

## 2021-04-23 PROCEDURE — 85610 PROTHROMBIN TIME: CPT

## 2021-04-23 PROCEDURE — 36415 COLL VENOUS BLD VENIPUNCTURE: CPT

## 2021-04-23 RX ORDER — MIDAZOLAM HYDROCHLORIDE 1 MG/ML
2 INJECTION INTRAMUSCULAR; INTRAVENOUS ONCE
Status: CANCELLED | OUTPATIENT
Start: 2021-04-23 | End: 2021-04-23

## 2021-04-23 RX ORDER — FENTANYL CITRATE 50 UG/ML
100 INJECTION, SOLUTION INTRAMUSCULAR; INTRAVENOUS ONCE
Status: CANCELLED | OUTPATIENT
Start: 2021-04-23

## 2021-04-23 RX ORDER — ROPIVACAINE HYDROCHLORIDE 5 MG/ML
30 INJECTION, SOLUTION EPIDURAL; INFILTRATION; PERINEURAL ONCE
Status: CANCELLED | OUTPATIENT
Start: 2021-04-23 | End: 2021-04-23

## 2021-04-23 RX ORDER — ACETAMINOPHEN AND CODEINE PHOSPHATE 300; 60 MG/1; MG/1
1 TABLET ORAL EVERY 6 HOURS PRN
Status: ON HOLD | COMMUNITY
End: 2021-04-29 | Stop reason: HOSPADM

## 2021-04-23 ASSESSMENT — PAIN DESCRIPTION - DESCRIPTORS: DESCRIPTORS: DISCOMFORT;CONSTANT

## 2021-04-23 ASSESSMENT — PAIN DESCRIPTION - ORIENTATION: ORIENTATION: LEFT

## 2021-04-23 ASSESSMENT — PAIN DESCRIPTION - LOCATION: LOCATION: KNEE;BACK

## 2021-04-23 ASSESSMENT — PAIN DESCRIPTION - FREQUENCY: FREQUENCY: CONTINUOUS

## 2021-04-23 ASSESSMENT — PAIN DESCRIPTION - ONSET: ONSET: ON-GOING

## 2021-04-23 NOTE — ANESTHESIA PRE PROCEDURE
Department of Anesthesiology  Preprocedure Note       Name:  Mauricio Braswell   Age:  62 y.o.  :  1960                                          MRN:  [id number]         Date:  2021      Surgeon: Bharathi Teran): Amber Joseph DO    Procedure: Procedure(s):  LEFT KNEE TOTAL KNEE ARTHROPLASTY [facility])    Medications prior to admission:   Prior to Admission medications    Medication Sig Start Date End Date Taking? Authorizing Provider   nitrofurantoin, macrocrystal-monohydrate, (MACROBID) 100 MG capsule Take 1 capsule by mouth 2 times daily for 7 days 4/26/21 5/3/21  Bill Glynn DO   acetaminophen-codeine (TYLENOL/CODEINE #4) 300-60 MG per tablet Take 1 tablet by mouth every 6 hours as needed for Pain.     Historical Provider, MD   diclofenac (VOLTAREN) 50 MG EC tablet TAKE 1 TABLET BY MOUTH 2 TIMES A DAY WITH MEALS 21   KRISTINA John - CNP   hydrOXYzine (VISTARIL) 25 MG capsule Take 1 capsule by mouth 4 times daily as needed for Anxiety 21  Bill Glynn DO   simvastatin (ZOCOR) 20 MG tablet Take 1 tablet by mouth nightly 3/4/21   Eliceo Rivera DO   traZODone (DESYREL) 150 MG tablet Take 1 tablet by mouth nightly 3/4/21   Bill Glynn DO   diclofenac 1 % CREA Apply 2 g topically 4 times daily To affected area 21   Christopher Magaña DO   tiZANidine (ZANAFLEX) 4 MG tablet Take 1 tablet by mouth every 8 hours as needed (MUSCLE SPASMS) 21   Randa Pap Gómez, DO   famotidine (PEPCID) 20 MG tablet Take 1 tablet by mouth 2 times daily 21   Randa Pap Gómez DO   atenolol (TENORMIN) 50 MG tablet Take 1 tablet by mouth nightly 21   Randa Pap Gómze, DO   levothyroxine (SYNTHROID) 50 MCG tablet Take 1 tablet by mouth Daily 21   Randa Pap Góemz, DO   sertraline (ZOLOFT) 100 MG tablet Take 1 tablet by mouth daily Indications: takes 2 tablets at hs Take 2 tablet at hs po 21   Christopher Magaña DO   naproxen (NAPROSYN) 500 MG tablet Take 1 tablet by mouth 2 times daily (with meals) 4/16/20   Reyes Prima Bisel, DO   Cholecalciferol (VITAMIN D-3) 5000 UNITS TABS Take  by mouth.     Historical Provider, MD       Current medications:    Current Facility-Administered Medications   Medication Dose Route Frequency Provider Last Rate Last Admin    chlorhexidine (HIBICLENS) 4 % liquid   Topical On Call to [facility], APRN - CNP        sodium chloride flush 0.9 % injection 10 mL  10 mL Intravenous 2 times per day Belen Sample, APRN - CNP        sodium chloride flush 0.9 % injection 10 mL  10 mL Intravenous PRN Belen Sample, APRN - CNP        0.9 % sodium chloride infusion  25 mL Intravenous PRN Belen Sample, APRN - CNP        ceFAZolin (ANCEF) 3,000 mg in dextrose 5 % 100 mL IVPB  3,000 mg Intravenous On Call to [facility], APRN - CNP        ortho mix injection   Injection On Call Deja Ricketts DO        lactated ringers infusion   Intravenous Continuous Nidia Mix MD 50 mL/hr at 04/28/21 0911 New Bag at 04/28/21 0911    fentaNYL (SUBLIMAZE) injection 100 mcg  100 mcg Intravenous Once Vinny Phillips MD        midazolam (VERSED) injection 2 mg  2 mg Intravenous Once Sia Cramer MD        ropivacaine (NAROPIN) 0.5% injection 30 mL  30 mL Infiltration Once Sia Cramer MD        fentaNYL (SUBLIMAZE) 100 MCG/2ML injection             midazolam (VERSED) 2 MG/2ML injection             ropivacaine (NAROPIN) 0.5% injection             dextrose 5 % and 0.45 % NaCl with KCl 20 mEq infusion   Intravenous Continuous Deja Ricketts DO        ceFAZolin (ANCEF) 2000 mg in dextrose 3 % 50 mL IVPB (duplex)  2,000 mg Intravenous Q8H Deja Ricketts, DO        acetaminophen (TYLENOL) tablet 650 mg  650 mg Oral Q4H PRN Deja Ricketts,         bisacodyl (DULCOLAX) suppository 10 mg  10 mg Rectal PRN Deja Ricketts,         magnesium hydroxide (MILK OF MAGNESIA) 400 MG/5ML suspension 30 mL  30 mL Hyperlipidemia     Nausea & vomiting     Obesity, Class III, BMI 40-49.9 (morbid obesity) (HCC)     Osteoarthritis     PVC (premature ventricular contraction)     Sleep apnea     CPAP    Thyroid disease        Past Surgical History:        Procedure Laterality Date     SECTION      x2    CHOLECYSTECTOMY      COLONOSCOPY  14    bx done Dr Natalee Tejada  2011         KNEE ARTHROSCOPY      NERVE BLOCK Bilateral 3/5/14    lumbar facet #1    NERVE BLOCK  14    paravertebral facet bilateral lumbar #2    NERVE BLOCK N/A 4 9 14    cerv #1    NERVE BLOCK N/A 2014    cervical epidural  #2    NERVE BLOCK N/A  23 14    cerv #3    NERVE BLOCK Bilateral 2020    intra-articular facet joint injection     OTHER SURGICAL HISTORY  14    Radiofrequency right lumbar L3-4, L4-5, L5-S1    OTHER SURGICAL HISTORY Left 2014    lumbar radiofrequency    PAIN MANAGEMENT PROCEDURE Bilateral 2020    BILATERAL INTRA-ARTICULAR FACET JOINT INJECTION WITH FLUOROSCOPIC GUIDANCE AT L3-4, L4-5, L5-S1 (CPT Q3937286, 19827) performed by Linda Finley DO at [address]  14    ashly ,bx sm bowel  Dr Dwayne Rodriguez History:    Social History     Tobacco Use    Smoking status: Never Smoker    Smokeless tobacco: Never Used   Substance Use Topics    Alcohol use: Yes     Comment: occasionally                                Counseling given: Not Answered      Vital Signs (Current):   Vitals:    21 0851   BP: 120/66   Pulse: 69   Resp: 18   Temp: 97.3 °F (36.3 °C)   TempSrc: Infrared   SpO2: 94%   Weight: 273 lb (123.8 kg)   Height: 5' 6\" (1.676 m)                                              BP Readings from Last 3 Encounters:   21 120/66   21 137/62   21 114/76       NPO Status: Time of last liquid consumption:                         Time of last solid consumption:  Date of last liquid consumption: 04/27/21                        Date of last solid food consumption: 04/27/21    BMI:   Wt Readings from Last 3 Encounters:   04/28/21 273 lb (123.8 kg)   04/23/21 273 lb (123.8 kg)   04/01/21 268 lb (121.6 kg)     Body mass index is 44.06 kg/m². CBC:   Lab Results   Component Value Date    WBC 7.7 04/23/2021    RBC 4.42 04/23/2021    HGB 13.6 04/23/2021    HCT 42.9 04/23/2021    MCV 97.1 04/23/2021    RDW 13.1 04/23/2021     04/23/2021       CMP:   Lab Results   Component Value Date     04/23/2021    K 4.2 04/23/2021     04/23/2021    CO2 28 04/23/2021    BUN 10 04/23/2021    CREATININE 0.8 04/23/2021    GFRAA >60 04/23/2021    LABGLOM >60 04/23/2021    GLUCOSE 104 04/23/2021    GLUCOSE 102 12/15/2011    PROT 7.5 02/04/2021    CALCIUM 9.5 04/23/2021    BILITOT 0.3 02/04/2021    ALKPHOS 67 02/04/2021    AST 21 02/04/2021    ALT 24 02/04/2021       POC Tests: No results for input(s): POCGLU, POCNA, POCK, POCCL, POCBUN, POCHEMO, POCHCT in the last 72 hours. Coags:   Lab Results   Component Value Date    PROTIME 12.1 04/23/2021    PROTIME 13.6 12/15/2011    INR 1.0 04/23/2021    APTT 32.0 04/23/2021       HCG (If Applicable):   Lab Results   Component Value Date    PREGTESTUR NEGATIVE 12/16/2014        ABGs: No results found for: PHART, PO2ART, VBN2HKX, MPV5JTI, BEART, U2URWFPW     Type & Screen (If Applicable):  No results found for: LABABO, LABRH    Drug/Infectious Status (If Applicable):  No results found for: HIV, HEPCAB    COVID-19 Screening (If Applicable):   Lab Results   Component Value Date    COVID19 Not Detected 04/23/2021           Anesthesia Evaluation  Patient summary reviewed   history of anesthetic complications: PONV.   Airway: Mallampati: III  TM distance: >3 FB   Neck ROM: full  Mouth opening: > = 3 FB Dental:          Pulmonary: breath sounds clear to auscultation  (+) sleep apnea: on CPAP, Cardiovascular:    (+) dysrhythmias (PVCs  ):, hyperlipidemia      ECG reviewed  Rhythm: regular  Rate: normal    Stress test reviewed             ROS comment: On atenolol for PVCs    EKG 4/2021: sinus bradycardia    Stress Test 2011:  Findings- Left ventricular myocardial contractibility was evaluated as  part of SPECT cardiac imaging. Left ventricular chamber size is normal.  Myocardial motion is unremarkable.     Impression- Unremarkable study.     Ejection fraction-     Findings- SPECT gated images of the left ventricular myocardium were  obtained for purposes of left ventricular ejection fraction  determination.  Left ventricular ejection fraction is calculated at  67%.       IMPRESSION-  Left ventricular ejection fraction calculated at 67%. Neuro/Psych:   (+) neuromuscular disease (cervical radiculopathy, osteoarthritsi of C-spine, lumbar facet syndrome, sacroilitis):, psychiatric history:depression/anxiety              ROS comment: MRI L-spine 11/2020:  1.  Lumbar spondylosis as described above notable at L4/L5 where there is  mild central canal stenosis and bilateral neural foraminal narrowing. 2.  Mild anterolisthesis L4 on L5 has increased compared to prior from  December 7, 2013 and is likely secondary to severe facet arthritis. GI/Hepatic/Renal:   (+) GERD:, morbid obesity          Endo/Other:    (+) hypothyroidism: arthritis:., .                  ROS comment: Left achilles tenodonitis. Abdominal:   (+) obese,         Vascular: negative vascular ROS. PCP preoperative evaluation 4/1/2021:  \"medically acceptable risk for the proposed surgery\"     Anesthesia Plan      spinal     ASA 3     (Left adductor canal block)  Induction: intravenous. MIPS: Postoperative opioids intended and Prophylactic antiemetics administered. Anesthetic plan and risks discussed with patient. Plan discussed with CRNA.           Risks and benefits of spinal anesthesia with preoperative left saphenous peripheral neve block discussed with patient. All patient's questions were answered to her satisfaction. Patient consents to and agrees to spinal anesthesia with preoperative left saphenous peripheral nerve block. Patient to be evaluated on day of surgery by DOS anesthesiologist.        Lorne Storey MD   4/23/2021    DOS STAFF ADDENDUM:    Pt seen and examined, chart reviewed (including anesthesia, drug and allergy history). Anesthetic plan, risks, benefits, alternatives, and personnel involved discussed with patient. Patient verbalized an understanding and agrees to proceed. Plan discussed with care team members and agreed upon.     Solo Maurice MD  Staff Anesthesiologist  9:21 AM

## 2021-04-23 NOTE — PROGRESS NOTES
[address] [facility]                                                                                                                    PRE OP INSTRUCTIONS FOR  Trino Arceo        Date: 4/23/2021    Date of surgery: 4/28/21   Arrival Time: Hospital will call you between 5pm and 7pm with your final arrival time for surgery    1. Do not eat or drink anything after midnight prior to surgery. This includes no water, chewing gum, mints or ice chips. 2. Take the following medications with a small sip of water on the morning of Surgery: hydroxizine     3. Diabetics may take evening dose of insulin but none after midnight. If you feel symptomatic or low blood sugar morning of surgery drink 1-2 ounces of apple juice only. 4. Aspirin, Ibuprofen, Advil, Naproxen, Vitamin E and other Anti-inflammatory products should be stopped  before surgery  as directed by your physician. Take Tylenol only unless instructed otherwise by your surgeon. 5. Check with your Doctor regarding stopping Plavix, Coumadin, Lovenox, Eliquis, Effient, or other blood thinners. 6. Do not smoke,use illicit drugs and do not drink any alcoholic beverages 24 hours prior to surgery. 7. You may brush your teeth the morning of surgery. DO NOT SWALLOW WATER    8. You MUST make arrangements for a responsible adult to take you home after your surgery. You will not be allowed to leave alone or drive yourself home. It is strongly suggested someone stay with you the first 24 hrs. Your surgery will be cancelled if you do not have a ride home. 9. Please wear simple, loose fitting clothing to the hospital.  Jass Prestony not bring valuables (money, credit cards, checkbooks, etc.) Do not wear any makeup (including no eye makeup) or nail polish on your fingers or toes. 10. DO NOT wear any jewelry or piercings on day of surgery. All body piercing jewelry must be removed. 11.  Shower the night before surgery with ___Antibacterial soap /JOSIAS WIPES___x_____    12. TOTAL JOINT REPLACEMENT/HYSTERECTOMY PATIENTS ONLY---Remember to bring Blood Bank bracelet to the hospital on the day of surgery. 13. If you have a Living Will and Durable Power of  for Healthcare, please bring in a copy. 14. If appropriate bring crutches, inspirex, WALKER, CANE etc...    13. Notify your Surgeon if you develop any illness between now and surgery time, cough, cold, fever, sore throat, nausea, vomiting, etc.  Please notify your surgeon if you experience dizziness, shortness of breath or blurred vision between now & the time of your surgery. 16. If you have ___dentures, they will be removed before going to the OR; we will provide you a container. If you wear ___contact lenses or ___glasses, they will be removed; please bring a case for them. 17. To provide excellent care visitors will be limited to 1 in the room at any given time. 18. Sleep apnea patients need to know CPAP  SETTINGS                                                                                          19. During flu season no children under the age of 15 are permitted in the hospital for the safety of all patients. 20. Other                  Please call AMBULATORY CARE if you have any further questions.    [address] [facility]     [address]

## 2021-04-25 LAB
MRSA CULTURE ONLY: NORMAL
ORGANISM: ABNORMAL
ORGANISM: ABNORMAL
SARS-COV-2: NOT DETECTED
SOURCE: NORMAL
URINE CULTURE, ROUTINE: ABNORMAL
URINE CULTURE, ROUTINE: ABNORMAL

## 2021-04-26 NOTE — CARE COORDINATION
SS Note: Covid negative 4/23/21. Pt was at [facility] this morning for left knee surgery on 4/28. SW called pt at home to complete transition of care. Pt stated she resides with her , bedroom and bathroom on first floor, one step to enter home. Stated is independent and employed with [employer] ([employer] Pt stated she owns wheeled walker and cane. Pt denies need for a bedside commode; pt uncertain if she needs shower chair however if needed after she returns home she will have  purchase. Pt specifically requested [facility] as only agency in network with insurance. Stated her PCP is Dr. Carmencita Harper. Patient has prescription coverage, uses [id number] or [employer]. Pt plans to return home upon discharge with [facility],  will transport pt home, will need [facility] orders.   Electronically signed by BRENT Hansen on 4/26/2021 at 4:15 PM

## 2021-04-26 NOTE — PROGRESS NOTES
U/A, and Urine C&S faxed to [facility]Sra Roth and [id number]. Also called Augustina at Dr Mukesh Roth.   Janice Prater  4/26/2021  8:36 AM

## 2021-04-28 ENCOUNTER — APPOINTMENT (OUTPATIENT)
Dept: GENERAL RADIOLOGY | Age: 61
End: 2021-04-28
Attending: ORTHOPAEDIC SURGERY
Payer: COMMERCIAL

## 2021-04-28 ENCOUNTER — ANESTHESIA (OUTPATIENT)
Dept: OPERATING ROOM | Age: 61
End: 2021-04-28
Payer: COMMERCIAL

## 2021-04-28 ENCOUNTER — HOSPITAL ENCOUNTER (OUTPATIENT)
Age: 61
Setting detail: OBSERVATION
Discharge: HOME | End: 2021-04-29
Attending: ORTHOPAEDIC SURGERY | Admitting: ORTHOPAEDIC SURGERY
Payer: COMMERCIAL

## 2021-04-28 VITALS
RESPIRATION RATE: 19 BRPM | DIASTOLIC BLOOD PRESSURE: 79 MMHG | SYSTOLIC BLOOD PRESSURE: 127 MMHG | OXYGEN SATURATION: 94 %

## 2021-04-28 DIAGNOSIS — Z01.818 PREOP TESTING: Primary | ICD-10-CM

## 2021-04-28 DIAGNOSIS — M17.12 PRIMARY OSTEOARTHRITIS OF ONE KNEE, LEFT: ICD-10-CM

## 2021-04-28 DIAGNOSIS — M17.12 PRIMARY OSTEOARTHRITIS OF LEFT KNEE: ICD-10-CM

## 2021-04-28 PROBLEM — Z96.659 S/P KNEE REPLACEMENT: Status: ACTIVE | Noted: 2021-04-28

## 2021-04-28 PROBLEM — Z96.652 STATUS POST TOTAL LEFT KNEE REPLACEMENT: Status: ACTIVE | Noted: 2021-04-28

## 2021-04-28 PROCEDURE — G0378 HOSPITAL OBSERVATION PER HR: HCPCS

## 2021-04-28 PROCEDURE — 6370000000 HC RX 637 (ALT 250 FOR IP): Performed by: ORTHOPAEDIC SURGERY

## 2021-04-28 PROCEDURE — 6360000002 HC RX W HCPCS: Performed by: ANESTHESIOLOGY

## 2021-04-28 PROCEDURE — 3600000005 HC SURGERY LEVEL 5 BASE: Performed by: ORTHOPAEDIC SURGERY

## 2021-04-28 PROCEDURE — 6360000002 HC RX W HCPCS: Performed by: ORTHOPAEDIC SURGERY

## 2021-04-28 PROCEDURE — 6370000000 HC RX 637 (ALT 250 FOR IP): Performed by: NURSE PRACTITIONER

## 2021-04-28 PROCEDURE — 2709999900 HC NON-CHARGEABLE SUPPLY: Performed by: ORTHOPAEDIC SURGERY

## 2021-04-28 PROCEDURE — 88311 DECALCIFY TISSUE: CPT

## 2021-04-28 PROCEDURE — 2580000003 HC RX 258: Performed by: ORTHOPAEDIC SURGERY

## 2021-04-28 PROCEDURE — 6360000002 HC RX W HCPCS: Performed by: NURSE ANESTHETIST, CERTIFIED REGISTERED

## 2021-04-28 PROCEDURE — 27447 TOTAL KNEE ARTHROPLASTY: CPT | Performed by: ORTHOPAEDIC SURGERY

## 2021-04-28 PROCEDURE — 2500000003 HC RX 250 WO HCPCS: Performed by: ORTHOPAEDIC SURGERY

## 2021-04-28 PROCEDURE — 2580000003 HC RX 258: Performed by: ANESTHESIOLOGY

## 2021-04-28 PROCEDURE — 6360000002 HC RX W HCPCS: Performed by: NURSE PRACTITIONER

## 2021-04-28 PROCEDURE — 64447 NJX AA&/STRD FEMORAL NRV IMG: CPT | Performed by: ANESTHESIOLOGY

## 2021-04-28 PROCEDURE — 97530 THERAPEUTIC ACTIVITIES: CPT | Performed by: PHYSICAL THERAPIST

## 2021-04-28 PROCEDURE — 97116 GAIT TRAINING THERAPY: CPT | Performed by: PHYSICAL THERAPIST

## 2021-04-28 PROCEDURE — 88305 TISSUE EXAM BY PATHOLOGIST: CPT

## 2021-04-28 PROCEDURE — 2720000010 HC SURG SUPPLY STERILE: Performed by: ORTHOPAEDIC SURGERY

## 2021-04-28 PROCEDURE — 7100000000 HC PACU RECOVERY - FIRST 15 MIN: Performed by: ORTHOPAEDIC SURGERY

## 2021-04-28 PROCEDURE — 7100000001 HC PACU RECOVERY - ADDTL 15 MIN: Performed by: ORTHOPAEDIC SURGERY

## 2021-04-28 PROCEDURE — 2580000003 HC RX 258: Performed by: NURSE PRACTITIONER

## 2021-04-28 PROCEDURE — 2580000003 HC RX 258: Performed by: NURSE ANESTHETIST, CERTIFIED REGISTERED

## 2021-04-28 PROCEDURE — 6370000000 HC RX 637 (ALT 250 FOR IP): Performed by: PHYSICAL THERAPY ASSISTANT

## 2021-04-28 PROCEDURE — 2500000003 HC RX 250 WO HCPCS: Performed by: NURSE ANESTHETIST, CERTIFIED REGISTERED

## 2021-04-28 PROCEDURE — 97161 PT EVAL LOW COMPLEX 20 MIN: CPT | Performed by: PHYSICAL THERAPIST

## 2021-04-28 PROCEDURE — 3600000015 HC SURGERY LEVEL 5 ADDTL 15MIN: Performed by: ORTHOPAEDIC SURGERY

## 2021-04-28 PROCEDURE — 73560 X-RAY EXAM OF KNEE 1 OR 2: CPT

## 2021-04-28 PROCEDURE — 3700000000 HC ANESTHESIA ATTENDED CARE: Performed by: ORTHOPAEDIC SURGERY

## 2021-04-28 PROCEDURE — C1713 ANCHOR/SCREW BN/BN,TIS/BN: HCPCS | Performed by: ORTHOPAEDIC SURGERY

## 2021-04-28 PROCEDURE — 3700000001 HC ADD 15 MINUTES (ANESTHESIA): Performed by: ORTHOPAEDIC SURGERY

## 2021-04-28 PROCEDURE — 6360000002 HC RX W HCPCS

## 2021-04-28 PROCEDURE — C1776 JOINT DEVICE (IMPLANTABLE): HCPCS | Performed by: ORTHOPAEDIC SURGERY

## 2021-04-28 DEVICE — KNEE K1 TOT HEMI STD CEM IMPL CAPPED K1 SN: Type: IMPLANTABLE DEVICE | Status: FUNCTIONAL

## 2021-04-28 DEVICE — CEMENT BNE 40 GM RADIOPAQUE BA SIMPLEX P: Type: IMPLANTABLE DEVICE | Status: FUNCTIONAL

## 2021-04-28 DEVICE — GENESIS II NON-POROUS TIBIAL                                    BASEPLATE SIZE 5 LEFT
Type: IMPLANTABLE DEVICE | Site: KNEE | Status: FUNCTIONAL
Brand: GENESIS II

## 2021-04-28 DEVICE — GEN II 7.5MM RESUR PAT 29MM
Type: IMPLANTABLE DEVICE | Site: KNEE | Status: FUNCTIONAL
Brand: GENESIS II

## 2021-04-28 DEVICE — LEGION POSTERIOR STABILIZED                                    OXINIUM FEMORAL SIZE 5 LEFT
Type: IMPLANTABLE DEVICE | Site: KNEE | Status: FUNCTIONAL
Brand: LEGION

## 2021-04-28 DEVICE — LEGION PS HIGH FLEX XLPE SZ 5-6 12MM
Type: IMPLANTABLE DEVICE | Status: FUNCTIONAL
Brand: LEGION

## 2021-04-28 RX ORDER — GABAPENTIN 100 MG/1
100 CAPSULE ORAL EVERY 8 HOURS
Status: DISCONTINUED | OUTPATIENT
Start: 2021-04-28 | End: 2021-04-29 | Stop reason: HOSPADM

## 2021-04-28 RX ORDER — FENTANYL CITRATE 50 UG/ML
INJECTION, SOLUTION INTRAMUSCULAR; INTRAVENOUS
Status: COMPLETED
Start: 2021-04-28 | End: 2021-04-28

## 2021-04-28 RX ORDER — SODIUM CHLORIDE, SODIUM LACTATE, POTASSIUM CHLORIDE, CALCIUM CHLORIDE 600; 310; 30; 20 MG/100ML; MG/100ML; MG/100ML; MG/100ML
INJECTION, SOLUTION INTRAVENOUS CONTINUOUS
Status: DISCONTINUED | OUTPATIENT
Start: 2021-04-28 | End: 2021-04-28

## 2021-04-28 RX ORDER — DOCUSATE SODIUM 100 MG/1
100 CAPSULE, LIQUID FILLED ORAL 2 TIMES DAILY
Status: DISCONTINUED | OUTPATIENT
Start: 2021-04-28 | End: 2021-04-29 | Stop reason: HOSPADM

## 2021-04-28 RX ORDER — FENTANYL CITRATE 50 UG/ML
25 INJECTION, SOLUTION INTRAMUSCULAR; INTRAVENOUS EVERY 5 MIN PRN
Status: DISCONTINUED | OUTPATIENT
Start: 2021-04-28 | End: 2021-04-28 | Stop reason: HOSPADM

## 2021-04-28 RX ORDER — ROPIVACAINE HYDROCHLORIDE 5 MG/ML
INJECTION, SOLUTION EPIDURAL; INFILTRATION; PERINEURAL
Status: COMPLETED | OUTPATIENT
Start: 2021-04-28 | End: 2021-04-28

## 2021-04-28 RX ORDER — LIDOCAINE HYDROCHLORIDE 20 MG/ML
INJECTION, SOLUTION INTRAVENOUS PRN
Status: DISCONTINUED | OUTPATIENT
Start: 2021-04-28 | End: 2021-04-28 | Stop reason: SDUPTHER

## 2021-04-28 RX ORDER — ATENOLOL 50 MG/1
50 TABLET ORAL NIGHTLY
Status: DISCONTINUED | OUTPATIENT
Start: 2021-04-28 | End: 2021-04-29 | Stop reason: HOSPADM

## 2021-04-28 RX ORDER — SODIUM CHLORIDE, SODIUM LACTATE, POTASSIUM CHLORIDE, CALCIUM CHLORIDE 600; 310; 30; 20 MG/100ML; MG/100ML; MG/100ML; MG/100ML
INJECTION, SOLUTION INTRAVENOUS CONTINUOUS PRN
Status: DISCONTINUED | OUTPATIENT
Start: 2021-04-28 | End: 2021-04-28 | Stop reason: SDUPTHER

## 2021-04-28 RX ORDER — FAMOTIDINE 20 MG/1
20 TABLET, FILM COATED ORAL 2 TIMES DAILY
Status: DISCONTINUED | OUTPATIENT
Start: 2021-04-28 | End: 2021-04-29 | Stop reason: HOSPADM

## 2021-04-28 RX ORDER — MEPERIDINE HYDROCHLORIDE 25 MG/ML
12.5 INJECTION INTRAMUSCULAR; INTRAVENOUS; SUBCUTANEOUS EVERY 5 MIN PRN
Status: DISCONTINUED | OUTPATIENT
Start: 2021-04-28 | End: 2021-04-28 | Stop reason: HOSPADM

## 2021-04-28 RX ORDER — FENTANYL CITRATE 50 UG/ML
50 INJECTION, SOLUTION INTRAMUSCULAR; INTRAVENOUS EVERY 5 MIN PRN
Status: DISCONTINUED | OUTPATIENT
Start: 2021-04-28 | End: 2021-04-28 | Stop reason: HOSPADM

## 2021-04-28 RX ORDER — PROPOFOL 10 MG/ML
INJECTION, EMULSION INTRAVENOUS CONTINUOUS PRN
Status: DISCONTINUED | OUTPATIENT
Start: 2021-04-28 | End: 2021-04-28 | Stop reason: SDUPTHER

## 2021-04-28 RX ORDER — ONDANSETRON 2 MG/ML
INJECTION INTRAMUSCULAR; INTRAVENOUS PRN
Status: DISCONTINUED | OUTPATIENT
Start: 2021-04-28 | End: 2021-04-28 | Stop reason: SDUPTHER

## 2021-04-28 RX ORDER — DEXAMETHASONE SODIUM PHOSPHATE 10 MG/ML
8 INJECTION INTRAMUSCULAR; INTRAVENOUS ONCE
Status: COMPLETED | OUTPATIENT
Start: 2021-04-28 | End: 2021-04-28

## 2021-04-28 RX ORDER — BISACODYL 10 MG
10 SUPPOSITORY, RECTAL RECTAL PRN
Status: DISCONTINUED | OUTPATIENT
Start: 2021-04-28 | End: 2021-04-29 | Stop reason: HOSPADM

## 2021-04-28 RX ORDER — ROPIVACAINE HYDROCHLORIDE 5 MG/ML
INJECTION, SOLUTION EPIDURAL; INFILTRATION; PERINEURAL
Status: COMPLETED
Start: 2021-04-28 | End: 2021-04-28

## 2021-04-28 RX ORDER — CHLORHEXIDINE GLUCONATE 4 G/100ML
SOLUTION TOPICAL
Status: ACTIVE | OUTPATIENT
Start: 2021-04-28 | End: 2021-04-28

## 2021-04-28 RX ORDER — SODIUM CHLORIDE 0.9 % (FLUSH) 0.9 %
10 SYRINGE (ML) INJECTION PRN
Status: DISCONTINUED | OUTPATIENT
Start: 2021-04-28 | End: 2021-04-28 | Stop reason: HOSPADM

## 2021-04-28 RX ORDER — ACETAMINOPHEN 325 MG/1
650 TABLET ORAL EVERY 4 HOURS PRN
Status: DISCONTINUED | OUTPATIENT
Start: 2021-04-28 | End: 2021-04-29 | Stop reason: HOSPADM

## 2021-04-28 RX ORDER — CEFAZOLIN SODIUM 2 G/50ML
SOLUTION INTRAVENOUS
Status: COMPLETED
Start: 2021-04-28 | End: 2021-04-28

## 2021-04-28 RX ORDER — SODIUM CHLORIDE 0.9 % (FLUSH) 0.9 %
10 SYRINGE (ML) INJECTION EVERY 12 HOURS SCHEDULED
Status: DISCONTINUED | OUTPATIENT
Start: 2021-04-28 | End: 2021-04-28 | Stop reason: HOSPADM

## 2021-04-28 RX ORDER — KETAMINE HYDROCHLORIDE 50 MG/ML
INJECTION, SOLUTION, CONCENTRATE INTRAMUSCULAR; INTRAVENOUS PRN
Status: DISCONTINUED | OUTPATIENT
Start: 2021-04-28 | End: 2021-04-28 | Stop reason: SDUPTHER

## 2021-04-28 RX ORDER — FENTANYL CITRATE 50 UG/ML
INJECTION, SOLUTION INTRAMUSCULAR; INTRAVENOUS PRN
Status: DISCONTINUED | OUTPATIENT
Start: 2021-04-28 | End: 2021-04-28 | Stop reason: SDUPTHER

## 2021-04-28 RX ORDER — ACETAMINOPHEN AND CODEINE PHOSPHATE 60; 300 MG/1; MG/1
1 TABLET ORAL EVERY 4 HOURS PRN
Status: DISCONTINUED | OUTPATIENT
Start: 2021-04-28 | End: 2021-04-28

## 2021-04-28 RX ORDER — LEVOTHYROXINE SODIUM 0.05 MG/1
50 TABLET ORAL DAILY
Status: DISCONTINUED | OUTPATIENT
Start: 2021-04-28 | End: 2021-04-29 | Stop reason: HOSPADM

## 2021-04-28 RX ORDER — HYDROXYZINE PAMOATE 25 MG/1
25 CAPSULE ORAL 4 TIMES DAILY PRN
Status: DISCONTINUED | OUTPATIENT
Start: 2021-04-28 | End: 2021-04-29 | Stop reason: HOSPADM

## 2021-04-28 RX ORDER — ATORVASTATIN CALCIUM 20 MG/1
20 TABLET, FILM COATED ORAL DAILY
Status: DISCONTINUED | OUTPATIENT
Start: 2021-04-28 | End: 2021-04-29 | Stop reason: HOSPADM

## 2021-04-28 RX ORDER — ACETAMINOPHEN AND CODEINE PHOSPHATE 300; 30 MG/1; MG/1
1 TABLET ORAL EVERY 4 HOURS PRN
Status: DISCONTINUED | OUTPATIENT
Start: 2021-04-28 | End: 2021-04-28

## 2021-04-28 RX ORDER — CELECOXIB 100 MG/1
200 CAPSULE ORAL EVERY 12 HOURS
Status: DISCONTINUED | OUTPATIENT
Start: 2021-04-28 | End: 2021-04-28

## 2021-04-28 RX ORDER — FENTANYL CITRATE 50 UG/ML
100 INJECTION, SOLUTION INTRAMUSCULAR; INTRAVENOUS ONCE
Status: COMPLETED | OUTPATIENT
Start: 2021-04-28 | End: 2021-04-28

## 2021-04-28 RX ORDER — NITROFURANTOIN 25; 75 MG/1; MG/1
100 CAPSULE ORAL 2 TIMES DAILY
Status: DISCONTINUED | OUTPATIENT
Start: 2021-04-28 | End: 2021-04-29 | Stop reason: HOSPADM

## 2021-04-28 RX ORDER — GABAPENTIN 300 MG/1
600 CAPSULE ORAL ONCE
Status: COMPLETED | OUTPATIENT
Start: 2021-04-28 | End: 2021-04-28

## 2021-04-28 RX ORDER — TRAMADOL HYDROCHLORIDE 50 MG/1
50 TABLET ORAL EVERY 6 HOURS PRN
Qty: 28 TABLET | Refills: 0 | Status: SHIPPED | OUTPATIENT
Start: 2021-04-28 | End: 2021-04-29 | Stop reason: HOSPADM

## 2021-04-28 RX ORDER — ACETAMINOPHEN AND CODEINE PHOSPHATE 300; 30 MG/1; MG/1
2 TABLET ORAL EVERY 6 HOURS PRN
Status: DISCONTINUED | OUTPATIENT
Start: 2021-04-28 | End: 2021-04-29 | Stop reason: HOSPADM

## 2021-04-28 RX ORDER — M-VIT,TX,IRON,MINS/CALC/FOLIC 27MG-0.4MG
1 TABLET ORAL DAILY
Status: DISCONTINUED | OUTPATIENT
Start: 2021-04-28 | End: 2021-04-29 | Stop reason: HOSPADM

## 2021-04-28 RX ORDER — HYDROCODONE BITARTRATE AND ACETAMINOPHEN 10; 325 MG/1; MG/1
1 TABLET ORAL EVERY 6 HOURS PRN
Status: DISCONTINUED | OUTPATIENT
Start: 2021-04-28 | End: 2021-04-28

## 2021-04-28 RX ORDER — GABAPENTIN 100 MG/1
100 CAPSULE ORAL 3 TIMES DAILY
Qty: 90 CAPSULE | Refills: 0 | Status: SHIPPED | OUTPATIENT
Start: 2021-04-28 | End: 2021-06-09

## 2021-04-28 RX ORDER — TRAMADOL HYDROCHLORIDE 50 MG/1
50 TABLET ORAL EVERY 4 HOURS PRN
Status: DISCONTINUED | OUTPATIENT
Start: 2021-04-28 | End: 2021-04-29 | Stop reason: HOSPADM

## 2021-04-28 RX ORDER — OXYCODONE AND ACETAMINOPHEN 10; 325 MG/1; MG/1
1 TABLET ORAL EVERY 4 HOURS PRN
Status: DISCONTINUED | OUTPATIENT
Start: 2021-04-28 | End: 2021-04-28

## 2021-04-28 RX ORDER — CEFAZOLIN SODIUM 2 G/50ML
2000 SOLUTION INTRAVENOUS EVERY 8 HOURS
Status: COMPLETED | OUTPATIENT
Start: 2021-04-28 | End: 2021-04-29

## 2021-04-28 RX ORDER — PROMETHAZINE HYDROCHLORIDE 25 MG/ML
25 INJECTION, SOLUTION INTRAMUSCULAR; INTRAVENOUS EVERY 6 HOURS PRN
Status: DISCONTINUED | OUTPATIENT
Start: 2021-04-28 | End: 2021-04-29 | Stop reason: HOSPADM

## 2021-04-28 RX ORDER — MIDAZOLAM HYDROCHLORIDE 1 MG/ML
INJECTION INTRAMUSCULAR; INTRAVENOUS
Status: COMPLETED
Start: 2021-04-28 | End: 2021-04-28

## 2021-04-28 RX ORDER — TIZANIDINE 4 MG/1
4 TABLET ORAL EVERY 8 HOURS PRN
Status: DISCONTINUED | OUTPATIENT
Start: 2021-04-28 | End: 2021-04-29 | Stop reason: HOSPADM

## 2021-04-28 RX ORDER — MIDAZOLAM HYDROCHLORIDE 1 MG/ML
2 INJECTION INTRAMUSCULAR; INTRAVENOUS ONCE
Status: COMPLETED | OUTPATIENT
Start: 2021-04-28 | End: 2021-04-28

## 2021-04-28 RX ORDER — SODIUM CHLORIDE 9 MG/ML
25 INJECTION, SOLUTION INTRAVENOUS PRN
Status: DISCONTINUED | OUTPATIENT
Start: 2021-04-28 | End: 2021-04-28 | Stop reason: HOSPADM

## 2021-04-28 RX ORDER — DEXTROSE, SODIUM CHLORIDE, AND POTASSIUM CHLORIDE 5; .45; .15 G/100ML; G/100ML; G/100ML
INJECTION INTRAVENOUS CONTINUOUS
Status: DISCONTINUED | OUTPATIENT
Start: 2021-04-28 | End: 2021-04-29 | Stop reason: HOSPADM

## 2021-04-28 RX ORDER — MEPERIDINE HYDROCHLORIDE 50 MG/ML
50 INJECTION INTRAMUSCULAR; INTRAVENOUS; SUBCUTANEOUS EVERY 4 HOURS PRN
Status: DISCONTINUED | OUTPATIENT
Start: 2021-04-28 | End: 2021-04-29 | Stop reason: HOSPADM

## 2021-04-28 RX ORDER — TRAZODONE HYDROCHLORIDE 50 MG/1
150 TABLET ORAL NIGHTLY
Status: DISCONTINUED | OUTPATIENT
Start: 2021-04-28 | End: 2021-04-29 | Stop reason: HOSPADM

## 2021-04-28 RX ORDER — ROPIVACAINE HYDROCHLORIDE 5 MG/ML
30 INJECTION, SOLUTION EPIDURAL; INFILTRATION; PERINEURAL ONCE
Status: COMPLETED | OUTPATIENT
Start: 2021-04-28 | End: 2021-04-28

## 2021-04-28 RX ORDER — MIDAZOLAM HYDROCHLORIDE 1 MG/ML
INJECTION INTRAMUSCULAR; INTRAVENOUS PRN
Status: DISCONTINUED | OUTPATIENT
Start: 2021-04-28 | End: 2021-04-28 | Stop reason: SDUPTHER

## 2021-04-28 RX ADMIN — FENTANYL CITRATE 50 MCG: 50 INJECTION INTRAMUSCULAR; INTRAVENOUS at 14:23

## 2021-04-28 RX ADMIN — MIDAZOLAM HYDROCHLORIDE 2 MG: 1 INJECTION INTRAMUSCULAR; INTRAVENOUS at 09:46

## 2021-04-28 RX ADMIN — FENTANYL CITRATE 25 MCG: 50 INJECTION, SOLUTION INTRAMUSCULAR; INTRAVENOUS at 14:11

## 2021-04-28 RX ADMIN — GABAPENTIN 100 MG: 100 CAPSULE ORAL at 16:45

## 2021-04-28 RX ADMIN — SODIUM CHLORIDE, POTASSIUM CHLORIDE, SODIUM LACTATE AND CALCIUM CHLORIDE: 600; 310; 30; 20 INJECTION, SOLUTION INTRAVENOUS at 11:16

## 2021-04-28 RX ADMIN — FAMOTIDINE 20 MG: 20 TABLET, FILM COATED ORAL at 20:05

## 2021-04-28 RX ADMIN — TRAMADOL HYDROCHLORIDE 50 MG: 50 TABLET, FILM COATED ORAL at 22:59

## 2021-04-28 RX ADMIN — CEFAZOLIN SODIUM 2000 MG: 2 SOLUTION INTRAVENOUS at 14:44

## 2021-04-28 RX ADMIN — ROPIVACAINE HYDROCHLORIDE 40 ML: 5 INJECTION, SOLUTION EPIDURAL; INFILTRATION; PERINEURAL at 09:54

## 2021-04-28 RX ADMIN — MULTIPLE VITAMINS W/ MINERALS TAB 1 TABLET: TAB at 16:45

## 2021-04-28 RX ADMIN — TRAZODONE HYDROCHLORIDE 150 MG: 50 TABLET ORAL at 20:05

## 2021-04-28 RX ADMIN — FENTANYL CITRATE 25 MCG: 50 INJECTION INTRAMUSCULAR; INTRAVENOUS at 14:11

## 2021-04-28 RX ADMIN — SODIUM CHLORIDE, POTASSIUM CHLORIDE, SODIUM LACTATE AND CALCIUM CHLORIDE: 600; 310; 30; 20 INJECTION, SOLUTION INTRAVENOUS at 09:58

## 2021-04-28 RX ADMIN — FENTANYL CITRATE 50 MCG: 50 INJECTION INTRAMUSCULAR; INTRAVENOUS at 14:31

## 2021-04-28 RX ADMIN — CEFAZOLIN 3000 MG: 10 INJECTION, POWDER, FOR SOLUTION INTRAVENOUS at 10:05

## 2021-04-28 RX ADMIN — FENTANYL CITRATE 50 MCG: 50 INJECTION, SOLUTION INTRAMUSCULAR; INTRAVENOUS at 09:58

## 2021-04-28 RX ADMIN — FENTANYL CITRATE 50 MCG: 50 INJECTION, SOLUTION INTRAMUSCULAR; INTRAVENOUS at 09:46

## 2021-04-28 RX ADMIN — GABAPENTIN 600 MG: 300 CAPSULE ORAL at 09:14

## 2021-04-28 RX ADMIN — LIDOCAINE HYDROCHLORIDE 50 MG: 20 INJECTION, SOLUTION INTRAVENOUS at 10:04

## 2021-04-28 RX ADMIN — FENTANYL CITRATE 50 MCG: 50 INJECTION INTRAMUSCULAR; INTRAVENOUS at 09:46

## 2021-04-28 RX ADMIN — DEXAMETHASONE SODIUM PHOSPHATE 8 MG: 10 INJECTION INTRAMUSCULAR; INTRAVENOUS at 09:12

## 2021-04-28 RX ADMIN — ONDANSETRON 4 MG: 2 INJECTION INTRAMUSCULAR; INTRAVENOUS at 11:40

## 2021-04-28 RX ADMIN — ATORVASTATIN CALCIUM 20 MG: 20 TABLET, FILM COATED ORAL at 20:06

## 2021-04-28 RX ADMIN — ACETAMINOPHEN AND CODEINE PHOSPHATE 2 TABLET: 300; 30 TABLET ORAL at 20:06

## 2021-04-28 RX ADMIN — TRAMADOL HYDROCHLORIDE 50 MG: 50 TABLET, FILM COATED ORAL at 16:52

## 2021-04-28 RX ADMIN — NITROFURANTOIN (MONOHYDRATE/MACROCRYSTALS) 100 MG: 75; 25 CAPSULE ORAL at 20:05

## 2021-04-28 RX ADMIN — FENTANYL CITRATE 25 MCG: 50 INJECTION, SOLUTION INTRAMUSCULAR; INTRAVENOUS at 10:04

## 2021-04-28 RX ADMIN — POTASSIUM CHLORIDE, DEXTROSE MONOHYDRATE AND SODIUM CHLORIDE: 150; 5; 450 INJECTION, SOLUTION INTRAVENOUS at 16:48

## 2021-04-28 RX ADMIN — SODIUM CHLORIDE, POTASSIUM CHLORIDE, SODIUM LACTATE AND CALCIUM CHLORIDE: 600; 310; 30; 20 INJECTION, SOLUTION INTRAVENOUS at 09:11

## 2021-04-28 RX ADMIN — ROPIVACAINE HYDROCHLORIDE 35 ML: 5 INJECTION, SOLUTION EPIDURAL; INFILTRATION; PERINEURAL at 09:59

## 2021-04-28 RX ADMIN — PROPOFOL INJECTABLE EMULSION 100 MCG/KG/MIN: 10 INJECTION, EMULSION INTRAVENOUS at 10:04

## 2021-04-28 RX ADMIN — MIDAZOLAM 2 MG: 1 INJECTION INTRAMUSCULAR; INTRAVENOUS at 09:46

## 2021-04-28 RX ADMIN — DOCUSATE SODIUM 100 MG: 100 CAPSULE, LIQUID FILLED ORAL at 20:06

## 2021-04-28 RX ADMIN — SODIUM CHLORIDE, POTASSIUM CHLORIDE, SODIUM LACTATE AND CALCIUM CHLORIDE: 600; 310; 30; 20 INJECTION, SOLUTION INTRAVENOUS at 10:50

## 2021-04-28 RX ADMIN — CEFAZOLIN SODIUM 2000 MG: 2 SOLUTION INTRAVENOUS at 22:35

## 2021-04-28 RX ADMIN — ATENOLOL 50 MG: 50 TABLET ORAL at 20:06

## 2021-04-28 RX ADMIN — FENTANYL CITRATE 25 MCG: 50 INJECTION, SOLUTION INTRAMUSCULAR; INTRAVENOUS at 10:09

## 2021-04-28 RX ADMIN — SERTRALINE HYDROCHLORIDE 100 MG: 50 TABLET ORAL at 20:06

## 2021-04-28 RX ADMIN — KETAMINE HYDROCHLORIDE 25 MG: 50 INJECTION INTRAMUSCULAR; INTRAVENOUS at 10:04

## 2021-04-28 RX ADMIN — MIDAZOLAM 2 MG: 1 INJECTION INTRAMUSCULAR; INTRAVENOUS at 10:13

## 2021-04-28 RX ADMIN — LEVOTHYROXINE SODIUM 50 MCG: 50 TABLET ORAL at 20:10

## 2021-04-28 ASSESSMENT — PULMONARY FUNCTION TESTS
PIF_VALUE: 0
PIF_VALUE: 0
PIF_VALUE: 1
PIF_VALUE: 0
PIF_VALUE: 1
PIF_VALUE: 0
PIF_VALUE: 1
PIF_VALUE: 1
PIF_VALUE: 0
PIF_VALUE: 1
PIF_VALUE: 0
PIF_VALUE: 1
PIF_VALUE: 2
PIF_VALUE: 1
PIF_VALUE: 0

## 2021-04-28 ASSESSMENT — PAIN SCALES - GENERAL
PAINLEVEL_OUTOF10: 7
PAINLEVEL_OUTOF10: 3
PAINLEVEL_OUTOF10: 7
PAINLEVEL_OUTOF10: 3
PAINLEVEL_OUTOF10: 7
PAINLEVEL_OUTOF10: 0
PAINLEVEL_OUTOF10: 0
PAINLEVEL_OUTOF10: 3
PAINLEVEL_OUTOF10: 4

## 2021-04-28 ASSESSMENT — PAIN DESCRIPTION - ORIENTATION: ORIENTATION: LEFT

## 2021-04-28 ASSESSMENT — PAIN DESCRIPTION - PAIN TYPE: TYPE: SURGICAL PAIN

## 2021-04-28 NOTE — CONSULTS
MANAGEMENT PROCEDURE Bilateral 12/21/2020    BILATERAL INTRA-ARTICULAR FACET JOINT INJECTION WITH FLUOROSCOPIC GUIDANCE AT L3-4, L4-5, L5-S1 (CPT 51618, 17064) performed by Kecia Jeffrey DO at [address]  12/16/14    ashly ,bx sm bowel  Dr Bebeto Reid Hx:  Family History   Problem Relation Age of Onset    Heart Disease Brother         MI with stents    Other Brother         Frontal lobe lesion    Heart Disease Maternal Grandfather     Heart Disease Paternal Grandfather     Cancer Other     Depression Other     Mental Illness Other     Cancer Mother         breast with bone and liver mets    Bipolar Disorder Father     Other Father         Aspiration Pneumonia    Hypertension Sister     Depression Sister     Anxiety Disorder Sister        HOME MEDICATIONS:  Prior to Admission medications    Medication Sig Start Date End Date Taking? Authorizing Provider   nitrofurantoin, macrocrystal-monohydrate, (MACROBID) 100 MG capsule Take 1 capsule by mouth 2 times daily for 7 days 4/26/21 5/3/21  Bill Glynn DO   acetaminophen-codeine (TYLENOL/CODEINE #4) 300-60 MG per tablet Take 1 tablet by mouth every 6 hours as needed for Pain.     Historical Provider, MD   diclofenac (VOLTAREN) 50 MG EC tablet TAKE 1 TABLET BY MOUTH 2 TIMES A DAY WITH MEALS 4/9/21   KRISTINA Dotson - CNP   hydrOXYzine (VISTARIL) 25 MG capsule Take 1 capsule by mouth 4 times daily as needed for Anxiety 4/1/21 6/30/21  Bill Glynn DO   simvastatin (ZOCOR) 20 MG tablet Take 1 tablet by mouth nightly 3/4/21   Brock Smith DO   traZODone (DESYREL) 150 MG tablet Take 1 tablet by mouth nightly 3/4/21   Bill Glynn DO   diclofenac 1 % CREA Apply 2 g topically 4 times daily To affected area 1/8/21   Christopher Magaña DO   tiZANidine (ZANAFLEX) 4 MG tablet Take 1 tablet by mouth every 8 hours as needed (MUSCLE SPASMS) 1/8/21   Vj Magaña DO   famotidine (PEPCID) 20 MG tablet Take 1 tablet by mouth 2 times daily 1/6/21   Donelda Irineo Lyonsel, DO   atenolol (TENORMIN) 50 MG tablet Take 1 tablet by mouth nightly 1/6/21   Donelda Irineo Lyonsel, DO   levothyroxine (SYNTHROID) 50 MCG tablet Take 1 tablet by mouth Daily 1/6/21   Donelda Yone Eloyel, DO   sertraline (ZOLOFT) 100 MG tablet Take 1 tablet by mouth daily Indications: takes 2 tablets at hs Take 2 tablet at hs po 1/4/21   Christopher Magaña DO   naproxen (NAPROSYN) 500 MG tablet Take 1 tablet by mouth 2 times daily (with meals) 4/16/20   Alina Magaña, DO   Cholecalciferol (VITAMIN D-3) 5000 UNITS TABS Take  by mouth.     Historical Provider, MD       ALLERGIES:  Mobic [meloxicam], Morphine sulfate, Percocet [oxycodone-acetaminophen], and Vicodin [hydrocodone-acetaminophen]    SOCIAL Hx:  Social History     Socioeconomic History    Marital status:      Spouse name: Ambrosio    Number of children: 2    Years of education: 15    Highest education level: Not on file   Occupational History    Occupation: LPN   Social Needs    Financial resource strain: Not hard at all   "[employer]-Maria R insecurity     Worry: Never true     Inability: Never true    Transportation needs     Medical: No     Non-medical: No   Tobacco Use    Smoking status: Never Smoker    Smokeless tobacco: Never Used   Substance and Sexual Activity    Alcohol use: Yes     Comment: occasionally    Drug use: No    Sexual activity: Yes     Partners: Male   Lifestyle    Physical activity     Days per week: 4 days     Minutes per session: 30 min    Stress: Not at all   Relationships    Social connections     Talks on phone: More than three times a week     Gets together: More than three times a week     Attends Rastafari service: More than 4 times per year     Active member of club or organization: No     Attends meetings of clubs or organizations: Never     Relationship status:     Intimate partner violence     Fear of current or ex partner: No     Emotionally no bruits, no JVD    HEMATOLOGIC/LYMPHATICS:    No cervical lymphadenopathy and no supraclavicular lymphadenopathy    LUNGS:    Symmetric. No increased work of breathing, good air exchange, clear to auscultation bilaterally, no wheezes, rhonchi, or rales,     CARDIOVASCULAR:    Normal apical impulse, regular rate and rhythm, normal S1 and S2, no S3 or S4, and no murmur noted    ABDOMEN:    Normal contour, normal bowel sounds, soft, non-distended, non-tender, no masses palpated, no hepatosplenomegaly, no rebound or guarding elicited on palpation     MUSCULOSKELETAL:    There is no redness, warmth, or swelling of the joints. Full range of motion noted. Motor strength is 5 out of 5 all extremities bilaterally. Tone is normal.    NEUROLOGIC:    Awake, alert, oriented to name, place and time. Cranial nerves II-XII are grossly intact. Motor is 5 out of 5 bilaterally. SKIN:    No bruising or bleeding. No redness, warmth, or swelling    EXTREMITIES:    Peripheral pulses present. No edema, cyanosis, or swelling. Postoperative dressings are in place.     LABORATORY DATA:  CBC with Differential:    Lab Results   Component Value Date    WBC 7.7 04/23/2021    RBC 4.42 04/23/2021    HGB 13.6 04/23/2021    HCT 42.9 04/23/2021     04/23/2021    MCV 97.1 04/23/2021    MCH 30.8 04/23/2021    MCHC 31.7 04/23/2021    RDW 13.1 04/23/2021    SEGSPCT 57 11/10/2013    LYMPHOPCT 21.2 02/04/2021    MONOPCT 7.2 02/04/2021    BASOPCT 0.7 02/04/2021    MONOSABS 0.71 02/04/2021    LYMPHSABS 2.08 02/04/2021    EOSABS 0.16 02/04/2021    BASOSABS 0.07 02/04/2021     BMP:    Lab Results   Component Value Date     04/23/2021    K 4.2 04/23/2021     04/23/2021    CO2 28 04/23/2021    BUN 10 04/23/2021    LABALBU 4.7 02/04/2021    LABALBU 4.3 12/15/2011    CREATININE 0.8 04/23/2021    CALCIUM 9.5 04/23/2021    GFRAA >60 04/23/2021    LABGLOM >60 04/23/2021    GLUCOSE 104 04/23/2021    GLUCOSE 102 12/15/2011 ASSESSMENT:  1. Status post left knee replacement in the setting of longstanding osteoarthritis  2. Hyperlipidemia  3. Hypothyroidism  4. Generalized anxiety and depression    PLAN:  Patient is doing well postoperatively. Home medications will be resumed and appropriate pain control will be undertaken. She requires extensive work with the therapy teams. Her goals are to return home.     Emily Kinsey  11:39 AM  4/28/2021    Electronically signed by Navi Castro DO on 4/28/21 at 11:39 AM EDT

## 2021-04-28 NOTE — H&P
UPDATED H&P      Chief Complaint   Patient presents with    Knee Pain       f/u bilateral Zilretta injections. Patient states good relief but pain has returned but not to the extent before the injection.         Hannah Isaacs returns today for follow-up of her bilateral knee pain. Left knee >Right knee  She recently underwent bilateral knee Shabbir Jourdan and has had good relief.   Patient would like to discuss knee replacement.     Past Medical History        Past Medical History:   Diagnosis Date    Alopecia areata      Anxiety      Depression      Hyperlipidemia      Nausea & vomiting      Obesity, Class III, BMI 40-49.9 (morbid obesity) (HCC)      Osteoarthritis      PVC (premature ventricular contraction)      Sleep apnea       CPAP    Thyroid disease           Past Surgical History         Past Surgical History:   Procedure Laterality Date     SECTION         x2    CHOLECYSTECTOMY        COLONOSCOPY   14     bx done Dr Calvert Later   2011          KNEE ARTHROSCOPY        NERVE BLOCK Bilateral 3/5/14     lumbar facet #1    NERVE BLOCK   14     paravertebral facet bilateral lumbar #2    NERVE BLOCK N/A 4 9 14     cerv #1    NERVE BLOCK N/A 2014     cervical epidural  #2    NERVE BLOCK N/A 4 23 14     cerv #3    NERVE BLOCK Bilateral 2020     intra-articular facet joint injection     OTHER SURGICAL HISTORY   14     Radiofrequency right lumbar L3-4, L4-5, L5-S1    OTHER SURGICAL HISTORY Left 2014     lumbar radiofrequency    PAIN MANAGEMENT PROCEDURE Bilateral 2020     BILATERAL INTRA-ARTICULAR FACET JOINT INJECTION WITH FLUOROSCOPIC GUIDANCE AT L3-4, L4-5, L5-S1 (CPT [id number], [id number]) performed by Jodi Jones DO at [address]   14     ashly ,bx tomas bowel  Dr Colette Conner           Current Medication      Current Outpatient Medications:     simvastatin (ZOCOR) 20 MG Non-medical: No   Tobacco Use    Smoking status: Never Smoker    Smokeless tobacco: Never Used   Substance and Sexual Activity    Alcohol use: Yes       Comment: occasionally    Drug use: No    Sexual activity: Yes       Partners: Male   Lifestyle    Physical activity       Days per week: 4 days       Minutes per session: 30 min    Stress: Not at all   Relationships    Social connections       Talks on phone: More than three times a week       Gets together: More than three times a week       Attends Jewish service: More than 4 times per year       Active member of club or organization: No       Attends meetings of clubs or organizations: Never       Relationship status:     Intimate partner violence       Fear of current or ex partner: No       Emotionally abused: No       Physically abused: No       Forced sexual activity: No   Other Topics Concern    Not on file   Social History Narrative     Patient lives at home with . She is independent with ADL's, and does require the use of an assistive device.          Family History         Family History   Problem Relation Age of Onset    Heart Disease Brother           MI with stents    Other Brother           Frontal lobe lesion    Heart Disease Maternal Grandfather      Heart Disease Paternal Grandfather      Cancer Other      Depression Other      Mental Illness Other      Cancer Mother           breast with bone and liver mets    Bipolar Disorder Father      Other Father           Aspiration Pneumonia    Hypertension Sister      Depression Sister      Anxiety Disorder Sister              Review of Systems:      Skin: (-) rash,(-) psoriasis,(-) eczema, (-)skin cancer. Musculoskeletal: (-) fractures,  (-) dislocations,(-) collagen vascular disease, (-) fibromyalgia, (-) multiple sclerosis, (-) muscular dystrophy, (-) RSD,(-) joint pain (-)swelling, (-) joint pain,swelling.   Neurologic: (-) epilepsy, (-)seizures,(-) brain tumor,(-) TIA, (-)stroke, (-)headaches, (-)Parkinson disease,(-) memory loss, (-) LOC. Cardiovascular: (-) Chest pain, (-) swelling in legs/feet, (-) SOB, (-) cramping in legs/feet with walking.     Constitutional:    _BP 120/66   Pulse 69   Temp 97.3 °F (36.3 °C) (Infrared)   Resp 18   Ht 5' 6\" (1.676 m)   Wt 273 lb (123.8 kg)   LMP 10/13/2016   SpO2 94%   BMI 44.06 kg/m²  Vital signs are stable. In general, patient is awake, alert and oriented X3, in no apparent distress. Examination of HENT reveals normocephalic, atraumatic. PERRLA/EOMI sclera are white. Conjunctivae are clear. TM's are intact. Pharynx is pink and moist.  Uvula and tongue are midline. Heart: Positive S1 and positive S2 with regular rate and rhythm. Lungs: Clear to auscultation bilaterally without rales, rhonchi or wheezes. Abdomen: soft, nontender. Positive bowel sounds. No organomegaly. No guarding or rigidity. The patient is alert and oriented x 3, appears to be stated age and in no distress. /66   Pulse 69   Temp 97.3 °F (36.3 °C) (Infrared)   Resp 18   Ht 5' 6\" (1.676 m)   Wt 273 lb (123.8 kg)   LMP 10/13/2016   SpO2 94%   BMI 44.06 kg/m²      Skin:  Upon inspection: the skin appears warm, dry and intact. There is not a previous scar over the affected area. There is not any cellulitis, lymphedema or cutaneous lesions noted in the lower extremities. Upon palpation there is no induration noted.       Neurologic:  Gait: normal;  Motor exam of the lower extremities show ; quadriceps, hamstrings, foot dorsi and plantar flexors intact R.  5/5 and L. 5/5. Deep tendon reflexes are 2/4 at the knees and 2/4 at the ankles with strong extensor hallicus longus motor strength bilaterally. Sensory to both feet is intact to all sensory roots.     Cardiovascular: The vascular exam is normal and is well perfused to distal extremities. Distal pulses DP/PT: R. 2+; L. 2+.   There is cap refill noted less than two seconds their procedure.  The patient was made aware that brent Covid-19  may worsen their prognosis for recovering from their procedure  and lend to a higher morbidity and/or mortality risk.  All material risks, benefits, and reasonable alternatives including postponing the procedure were discussed.  The patient does wish to proceed with the procedure at this time.

## 2021-04-28 NOTE — ANESTHESIA PROCEDURE NOTES
Peripheral Block    Patient location during procedure: PACU  Start time: 4/28/2021 9:48 AM  End time: 4/28/2021 9:56 AM  Staffing  Performed: anesthesiologist   Anesthesiologist: Marcela Lynch MD  Preanesthetic Checklist  Completed: patient identified, IV checked, site marked, risks and benefits discussed, surgical consent, monitors and equipment checked, pre-op evaluation, timeout performed, anesthesia consent given, oxygen available and patient being monitored  Peripheral Block  Patient position: supine  Prep: ChloraPrep  Patient monitoring: cardiac monitor, continuous pulse ox, frequent blood pressure checks and IV access  Block type: Femoral  Laterality: left  Injection technique: single-shot  Guidance: ultrasound guided  Local infiltration: lidocaine  Infiltration strength: 1 %  Dose: 3 mL  Adductor canal  Provider prep: mask and sterile gloves  Local infiltration: lidocaine  Needle  Needle type: combined needle/nerve stimulator   Needle gauge: 20 G  Needle length: 10 cm  Needle localization: ultrasound guidance  Assessment  Injection assessment: negative aspiration for heme, no paresthesia on injection and local visualized surrounding nerve on ultrasound  Paresthesia pain: none  Slow fractionated injection: yes  Hemodynamics: stable  Additional Notes  U/S 15445.  (1) Under ultrasound guidance, a 22 gauge needle was inserted and placed in close proximity to the left femoral nerve.  (2) Ultrasound was also used to visualize the spread of the anesthetic in close proximity to the nerve being blocked. (3) The nerve appeared anatomically normal, and (4 there were no apparent abnormal pathological findings on the image that were readily visible and related to the nerve being blocked. (5) A permanent ultrasound image was saved in the patient's record.       Medications Administered  Ropivacaine (NAROPIN) injection 0.5%, 40 mL  Reason for block: post-op pain management and at surgeon's request

## 2021-04-28 NOTE — PROGRESS NOTES
Patient attached to monitors TO done at 945 for Left saphenous nerve block with Dr Lo Yoon, medicated per orders.  Pt yolanda well

## 2021-04-28 NOTE — PROGRESS NOTES
Physical Therapy Initial Evaluation    Room #:  0320/0320-01  Patient Name: Ellen Kim  YOB: 1960  MRN: [id number]    Referring Provider:  Karl Higgins DO     Date of Service: 2021    Evaluating Physical Therapist:  Cuong Anderson, PT  #[id number]       Diagnosis:   Primary osteoarthritis of one knee, left [M17.12]  Status post total left knee replacement [Z96.652]    status post Left Total knee arthroplasty (TKA)    Patient Active Problem List   Diagnosis    Mechanical low back pain    DDD (degenerative disc disease), lumbar    Facet syndrome, lumbar    Sacroiliitis, right    Protruded lumbar disc    degenerative Osteoarthritis of cervical spine    Cervical facet syndrome    Cervical radiculopathy    Protruded cervical disc    Shoulder impingement    GERD (gastroesophageal reflux disease)    Thyroid disease    Sleep apnea    PVC (premature ventricular contraction)    Obesity, Class III, BMI 40-49.9 (morbid obesity) ([address]    Hyperlipidemia    Depression    Left Achilles tendinitis    Primary osteoarthritis of one knee, left    S/P knee replacement    Status post total left knee replacement        Tentative placement recommendation: Home Health PT 5 days a week  Equipment recommendation: Patient has needed equipment       Prior Level of Function: Patient ambulated independently    Rehab Potential: good    for baseline    Past medical history:  Past Medical History:   Diagnosis Date    Alopecia areata     Anxiety     Depression     Hyperlipidemia     Nausea & vomiting     Obesity, Class III, BMI 40-49.9 (morbid obesity) ([address]     Osteoarthritis     PVC (premature ventricular contraction)     Sleep apnea     CPAP    Thyroid disease      Past Surgical History:   Procedure Laterality Date     SECTION      x2    CHOLECYSTECTOMY      COLONOSCOPY  14    bx done Dr Sunil Kuo  2011         KNEE ARTHROSCOPY      NERVE BLOCK Bilateral 3/5/14    lumbar facet #1    NERVE BLOCK  03/12/14    paravertebral facet bilateral lumbar #2    NERVE BLOCK N/A 4 9 14    cerv #1    NERVE BLOCK N/A 4/16/2014    cervical epidural  #2    NERVE BLOCK N/A 4 23 14    cerv #3    NERVE BLOCK Bilateral 12/21/2020    intra-articular facet joint injection     OTHER SURGICAL HISTORY  07/16/14    Radiofrequency right lumbar L3-4, L4-5, L5-S1    OTHER SURGICAL HISTORY Left 08 18 2014    lumbar radiofrequency    PAIN MANAGEMENT PROCEDURE Bilateral 12/21/2020    BILATERAL INTRA-ARTICULAR FACET JOINT INJECTION WITH FLUOROSCOPIC GUIDANCE AT L3-4, L4-5, L5-S1 (CPT 50141, 26369) performed by VHSquaredPltiffanie,  at [address]  12/16/14    ashly ,bx sm bowel  Dr Jass Singleton       Precautions:  , falls and alarm , left lower extremity WBAT (weight bearing as tolerated) urinary incontinence  IMPULSIVE    SUBJECTIVE:    Social history: Patient lives with spouse   in a ranch home  with 1+3 steps  to enter      Equipment owned: Cane and Standard Walker,       [address]   How much difficulty turning over in bed?: None  How much difficulty sitting down on / standing up from a chair with arms?: A Little  How much difficulty moving from lying on back to sitting on side of bed?: A Little  How much help from another person moving to and from a bed to a chair?: A Little  How much help from another person needed to walk in hospital room?: A Little  How much help from another person for climbing 3-5 steps with a railing?: A Little  AM-PAC Inpatient Mobility Raw Score : 19  AM-PAC Inpatient T-Scale Score : 45.44  Mobility Inpatient CMS 0-100% Score: 41.77  Mobility Inpatient CMS G-Code Modifier : CK    Nursing cleared patient for PT evaluation. The admitting diagnosis and active problem list as listed above have been reviewed prior to the initiation of this evaluation.        OBJECTIVE: than expected    Patient response to education:   Pt verbalized understanding Pt demonstrated skill Pt requires further education in this area   Yes Partial Yes       Treatment:  Patient practiced and was instructed in the following treatment:     Therapist educated and facilitated patient on techniques to increase safety and independence with bed mobility, balance, functional transfers, and functional mobility. Instruction knee extension in bed with kneecap and toes pointing to ceiling; no support under knee and annkle pumps and quad sets. Patient performed ankle pumps, quad sets, glut sets x 10 each. Active assist heelslide, hip abduction/adduction, straight leg raise x 10 each    Sat edge of bed 10 minutes with Supervision  to increase dynamic sitting balance and activity tolerance. Trf training, pre gait activities and gait training    Patient would benefit from continued skilled Physical Therapy to improve functional independence and quality of life. Patient's/ family goals   home        Patient and or family understand(s) diagnosis, prognosis, and plan of care. ASSESSMENT: Patient exhibits decreased strength, balance, endurance and range of motion , impulsivity, impairing functional mobility, transfers, gait , gait distance and tolerance to activity and safety are barriers to d/c and require skilled intervention during hospital stay  ; pt with increased drainage; while performing standing balance and performing hygiene after using   commode pt placed right foot onto brace of walker and stood on operative leg before this therapist could explain and assist safely with hygiene. Pt's impulsivity will impact outcomes and this was thoroughly discussed with her and . Per direction of rn, reinforcement of jun dressing performed with abd pad and 4x4's with ace wrap compression. PLAN:    Physical Therapy care will be provided in accordance with the objectives noted above.  Exercises and

## 2021-04-28 NOTE — OP NOTE
Operative Note      Patient: Emilie Vargas  YOB: 1960  MRN: [id number]    Date of Procedure: 4/28/2021    Pre-Op Diagnosis: LEFT KNEE DJD    Post-Op Diagnosis: Same       Procedure(s):  LEFT KNEE TOTAL KNEE ARTHROPLASTY [facility] & NEPHEW)    Surgeon(s): Ana Carbone DO    Assistant:   First Assistant: Patito Cheatham RN  Resident: Dimple Cowden, DO; Michelle Johnson DO; Enrique Gonzalez DO    Anesthesia: Spinal    Estimated Blood Loss (mL): Minimal    Complications: None    Specimens:   ID Type Source Tests Collected by Time Destination   A : bone left knee Tissue Tissue SURGICAL PATHOLOGY Ana Carbone DO 4/28/2021 1037        Implants:  Implant Name Type Inv. Item Serial No.  Lot No. LRB No. Used Action   CEMENT BNE 40 GM RADIOPAQUE BA SIMPLEX P Cement CEMENT BNE 40 GM RADIOPAQUE BA SIMPLEX P  [employer] [facility]- [id number] Left 1 Implanted   INSERT TIB SZ 5-6 [id number] POST KNEE POLYETH POST STBL HI  INSERT TIB SZ 5-6 [id number] POST KNEE POLYETH POST STBL HI  [facility] [employer]- [id number] Left 1 Implanted   BASEPLATE TIB SZ 5 [id number] ML74MM THK2. 3MM L KNEE TI ALLY NP  BASEPLATE TIB SZ 5 [id number] ML74MM THK2. 3MM L KNEE TI ALLY NP  [employer]- [id number] Left 1 Implanted   COMPONENT FEM SZ 5 L KNEE OXINIUM POST STBL DARIAN LEGION  COMPONENT FEM SZ 5 L KNEE OXINIUM POST STBL DARIAN LEGION  [employer]- [id number] Left 1 Implanted   COMPONENT PAT [id number] THK7. 5MM KNEE POLY RND RESURF GEN II  COMPONENT PAT [id number] THK7. 5MM KNEE POLY RND RESURF GEN II  [employer] Luis F Plummer [id number] Left 1 Implanted         Drains: * No LDAs found *    Findings: as above    Detailed Description of Procedure:   below    Department of Orthopedic Surgery  Operative Report        Pre-operative Diagnosis:  Left Knee Osteoarthritis    Post-operative Diagnosis:  Left Knee Osteoarthritis    Procedure:  Left Knee Arthroplasty    Surgeon:  Ana Carbone the distal femoral cut, discarding the pieces of cut femoral bone and sent for study. The posterior reference guide was then placed on the distal femur after the intramedullary guide and the distal femoral cutting guide were then removed. The posterior referencing guide was then pinned in appropriate position. Saulo wing was used to confirm appropriate femoral trial size according to pre-operative templating. Posterior reference guide was then removed. An appropriate sized 4-in-1 cutting guide was applied to the distal femur. Oscillating saw was used to make the anterior, posterior, and chamfer cuts. The 4-in-1 cutting guide was then removed. Attention was turned to the tibia. Intramedullary drilling was then performed of the proximal tibia. The intramedullary guide with the proximal tibial cutting guide was then placed on the tibia. Proximal tibial cutting guide was then pinned in appropriate position. After pinning the proximal tibial cutting guide in appropriate position, oscillating saw was then used to make the proximal tibial cut. The guide was then removed. The proximal tibia that was cut was sharply excised and removed. At this time, all osteophytes on the proximal tibia were then removed with a rongeur. Tensiometer was then placed in extension and flexion, confirming appropriate ligamentous balancing. The box-cutting guide for the posterior-stabilized knee was then placed on the distal femur. The box was then cut in the distal femur without complication. Box-cutting guide was then removed. An appropriately sized trial tibial baseplate and a polyethylene component trial were then placed into the knee. The appropriately sized femur trial component was then placed. The knee was reduced and taken through a range of motion and found to be appropriately stable. Half pins were then placed in the tibial base plate confirming position in preparation for keel punch. The patella was then prepared.   The patella surface was free hand cut for the appropriate size implant. The patella holes were drilled and the patella was then trialed. There was good alignment and tracking of the patella. Distal femoral component was removed, trial poly was then removed. Keel punch was then performed of the proximal tibial. Tibial base plate was then removed. Copious irrigation was performed of the wound and final inspection for all interposed soft tissue and loose bodies was then performed as cement was being mixed. Copious irrigation was performed once again of the knee. Cement was placed on the proximal tibial component and the tibial component was then impacted into appropriate position with all excess extruded cement being removed with Bedford elevator. A polyethylene component was then impacted into appropriate position and checked for stability, which it was stable. Cement was then placed on the distal femoral component. Distal femoral component was then impacted in appropriate position with all excess extruded cement being removed with a Bedford elevator. The knee was extended, taken through a range of motion, and found to be appropriately stable. Final inspection for all excess extruded cement was then performed. Bedford elevator removed all excess extruded cement. Copious irrigation was performed of the knee. The knee was then soaked with a bedadine solution soak for 3 minutes. The knee was then throughly irrigated with saline solution. Then 1 gram of vanomycin powder was placed within the wound. The capsule was  then closed with strata-fix closure. I then injected our TXA mixture into the knee joint. Copious irrigation was performed of this layer followed by, 2-0 Vicryl for the subcutaneous tissues, and zip line was used to secure incision. A sterile layered dressing was placed over the wound. Tourniquet was deflated. The patient was awakened from anesthesia, transferred to the hospital bed, and taken to the PACU in stable condition. Disposition: The patient was taken to PACU in stable condition. Once stable, the patient will be transferred to the floor. Orders have been provided to begin physical therapy, weight bear as tolerated Left lower extremity. Patient received a dose of antibiotics preoperatively. We will continue this for 24 hours postoperatively for infection prophylaxis. The patient will also be started on asa for DVT prophylaxis. We have consulted  and case management for discharge planning and consulted the PCP for medical management.        Electronically signed by Latia Call DO on 4/28/2021 at 1:29 PM

## 2021-04-28 NOTE — ANESTHESIA POSTPROCEDURE EVALUATION
Department of Anesthesiology  Postprocedure Note    Patient: Norberto Rodriguez  MRN: [id number]  YOB: 1960  Date of evaluation: 4/28/2021  Time:  2:40 PM     Procedure Summary     Date: 04/28/21 Room / Location: [address] [place] [address] / [address] [place] [address]    Anesthesia Start: 9568 Anesthesia Stop: 2949    Procedure: LEFT KNEE TOTAL KNEE ARTHROPLASTY [facility] & NEPH) (Left Knee) Diagnosis: (LEFT KNEE DJD)    Surgeons: Marcus Chavarria DO Responsible Provider: Dimple Teran MD    Anesthesia Type: spinal ASA Status: 3          Anesthesia Type: spinal    Surendra Phase I: Surendra Score: 9    Surendra Phase II:      Last vitals: Reviewed and per EMR flowsheets.        Anesthesia Post Evaluation    Patient location during evaluation: PACU  Patient participation: complete - patient participated  Level of consciousness: awake  Airway patency: patent  Nausea & Vomiting: no nausea and no vomiting  Complications: no  Cardiovascular status: hemodynamically stable  Respiratory status: acceptable  Hydration status: euvolemic

## 2021-04-29 VITALS
SYSTOLIC BLOOD PRESSURE: 116 MMHG | OXYGEN SATURATION: 96 % | RESPIRATION RATE: 17 BRPM | BODY MASS INDEX: 43.87 KG/M2 | DIASTOLIC BLOOD PRESSURE: 59 MMHG | WEIGHT: 273 LBS | HEART RATE: 68 BPM | HEIGHT: 66 IN | TEMPERATURE: 97.6 F

## 2021-04-29 PROBLEM — M17.12 PRIMARY OSTEOARTHRITIS OF LEFT KNEE: Status: ACTIVE | Noted: 2021-04-29

## 2021-04-29 LAB
ANION GAP SERPL CALCULATED.3IONS-SCNC: 7 MMOL/L (ref 7–16)
BUN BLDV-MCNC: 8 MG/DL (ref 6–20)
CALCIUM SERPL-MCNC: 8.4 MG/DL (ref 8.6–10.2)
CHLORIDE BLD-SCNC: 106 MMOL/L (ref 98–107)
CO2: 27 MMOL/L (ref 22–29)
CREAT SERPL-MCNC: 0.8 MG/DL (ref 0.5–1)
GFR AFRICAN AMERICAN: >60
GFR NON-AFRICAN AMERICAN: >60 ML/MIN/1.73
GLUCOSE BLD-MCNC: 118 MG/DL (ref 74–99)
HCT VFR BLD CALC: 36.1 % (ref 34–48)
HEMOGLOBIN: 11 G/DL (ref 11.5–15.5)
POTASSIUM SERPL-SCNC: 4.2 MMOL/L (ref 3.5–5)
SODIUM BLD-SCNC: 140 MMOL/L (ref 132–146)

## 2021-04-29 PROCEDURE — 96365 THER/PROPH/DIAG IV INF INIT: CPT

## 2021-04-29 PROCEDURE — 6370000000 HC RX 637 (ALT 250 FOR IP): Performed by: PHYSICAL THERAPY ASSISTANT

## 2021-04-29 PROCEDURE — 97530 THERAPEUTIC ACTIVITIES: CPT

## 2021-04-29 PROCEDURE — 36415 COLL VENOUS BLD VENIPUNCTURE: CPT

## 2021-04-29 PROCEDURE — 97535 SELF CARE MNGMENT TRAINING: CPT

## 2021-04-29 PROCEDURE — 97110 THERAPEUTIC EXERCISES: CPT

## 2021-04-29 PROCEDURE — 80048 BASIC METABOLIC PNL TOTAL CA: CPT

## 2021-04-29 PROCEDURE — G0378 HOSPITAL OBSERVATION PER HR: HCPCS

## 2021-04-29 PROCEDURE — 6360000002 HC RX W HCPCS: Performed by: ORTHOPAEDIC SURGERY

## 2021-04-29 PROCEDURE — 85014 HEMATOCRIT: CPT

## 2021-04-29 PROCEDURE — 97165 OT EVAL LOW COMPLEX 30 MIN: CPT

## 2021-04-29 PROCEDURE — 85018 HEMOGLOBIN: CPT

## 2021-04-29 PROCEDURE — 6370000000 HC RX 637 (ALT 250 FOR IP): Performed by: ORTHOPAEDIC SURGERY

## 2021-04-29 RX ORDER — ACETAMINOPHEN AND CODEINE PHOSPHATE 60; 300 MG/1; MG/1
1 TABLET ORAL EVERY 4 HOURS PRN
Qty: 28 TABLET | Refills: 0 | Status: SHIPPED | OUTPATIENT
Start: 2021-04-29 | End: 2021-05-06

## 2021-04-29 RX ORDER — ONDANSETRON 4 MG/1
4 TABLET, FILM COATED ORAL 3 TIMES DAILY PRN
Qty: 15 TABLET | Refills: 0 | Status: SHIPPED | OUTPATIENT
Start: 2021-04-29 | End: 2022-06-23 | Stop reason: CLARIF

## 2021-04-29 RX ADMIN — ASPIRIN 325 MG: 325 TABLET, COATED ORAL at 09:28

## 2021-04-29 RX ADMIN — GABAPENTIN 100 MG: 100 CAPSULE ORAL at 09:28

## 2021-04-29 RX ADMIN — DOCUSATE SODIUM 100 MG: 100 CAPSULE, LIQUID FILLED ORAL at 09:28

## 2021-04-29 RX ADMIN — FAMOTIDINE 20 MG: 20 TABLET, FILM COATED ORAL at 09:28

## 2021-04-29 RX ADMIN — CEFAZOLIN SODIUM 2000 MG: 2 SOLUTION INTRAVENOUS at 05:08

## 2021-04-29 RX ADMIN — NITROFURANTOIN (MONOHYDRATE/MACROCRYSTALS) 100 MG: 75; 25 CAPSULE ORAL at 09:28

## 2021-04-29 RX ADMIN — TRAMADOL HYDROCHLORIDE 50 MG: 50 TABLET, FILM COATED ORAL at 05:06

## 2021-04-29 RX ADMIN — GABAPENTIN 100 MG: 100 CAPSULE ORAL at 02:30

## 2021-04-29 RX ADMIN — ACETAMINOPHEN AND CODEINE PHOSPHATE 2 TABLET: 300; 30 TABLET ORAL at 08:54

## 2021-04-29 RX ADMIN — ACETAMINOPHEN AND CODEINE PHOSPHATE 2 TABLET: 300; 30 TABLET ORAL at 02:31

## 2021-04-29 ASSESSMENT — PAIN DESCRIPTION - PAIN TYPE: TYPE: SURGICAL PAIN

## 2021-04-29 ASSESSMENT — PAIN SCALES - GENERAL
PAINLEVEL_OUTOF10: 6
PAINLEVEL_OUTOF10: 4
PAINLEVEL_OUTOF10: 5
PAINLEVEL_OUTOF10: 5

## 2021-04-29 ASSESSMENT — PAIN DESCRIPTION - ORIENTATION: ORIENTATION: LEFT

## 2021-04-29 ASSESSMENT — PAIN DESCRIPTION - LOCATION: LOCATION: KNEE

## 2021-04-29 NOTE — PROGRESS NOTES
Department of Orthopedic Surgery  Resident Progress Note    Patient seen and examined. Pain controlled. No new complaints. Denies chest pain, shortness of breath, calf pain, dizziness/lightheadedness. ambulated 8 and 15 feet with PT on 4/28 with PT. Patient would like to go home today after therapy. VITALS:  BP (!) 116/59   Pulse 68   Temp 97.6 °F (36.4 °C)   Resp 17   Ht 5' 6\" (1.676 m)   Wt 273 lb (123.8 kg)   LMP 10/13/2016   SpO2 94%   BMI 44.06 kg/m²     GENERAL: alert and awake  MUSCULOSKELETAL:   left lower extremity:  · Dressing has mild saturation of REMI but good vacuum seal and no drainage. · Compartments soft and compressible, calf non-tender  · Palpable dorsalis pedis and posterior tibialis pulse, brisk cap refill to toes, foot warm and perfused  · Sensation intact to light touch in sural/deep peroneal/superficial peroneal/saphenous/posterior tibial nerve distributions to foot/ankle.   · Demonstrates active ankle plantar/dorsiflexion/great toe extension    CBC:   Lab Results   Component Value Date    WBC 7.7 04/23/2021    HGB 11.0 04/29/2021    HCT 36.1 04/29/2021     04/23/2021       ASSESSMENT  · S/p L TKA 4/28    PLAN    · WBAT LLE  · Pain control PO and IV- wean to orals  · DVT prophy- ASA  Post op abx  · Monitor vitals  · Trend H&H  · Pt/ot- early mobilization  · D/c- likely home later today- await final PT and SW recs

## 2021-04-29 NOTE — PROGRESS NOTES
OCCUPATIONAL THERAPY  Initial Evaluation  Date:2021  Patient Name: Alberto Godoy  MRN: [id number]  : 1960  ROOM #: 0320/0320-01     Referring Provider: Melissa Garcia DO  Evaluating OT: Cat Ruvalcaba OTR/L [id number]    Placement Recommendation: HHOT   Recommended Adaptive Equipment: provided a sock aide, long handle shoe horn, reacher      [facility]   AM-PAC Daily Activity Inpatient   How much help for putting on and taking off regular lower body clothing?: A Little  How much help for Bathing?: A Little  How much help for Toileting?: A Little  How much help for putting on and taking off regular upper body clothing?: A Little  How much help for taking care of personal grooming?: A Little  How much help for eating meals?: None  AM-PAC Inpatient Daily Activity Raw Score: 19  AM-PAC Inpatient ADL T-Scale Score : 40.22  ADL Inpatient CMS 0-100% Score: 42.8  ADL Inpatient CMS G-Code Modifier : CK    Based on patient's functional performance as stated below and their level of assistance needed prior to admission, this therapist believes that the patient would benefit from skilled Occupational Therapy following their hospital stay in an effort to increase safety and independence with completion of ADL/IADL tasks for functional independence and quality of life. Diagnosis:   1. Preop testing    2. Primary osteoarthritis of left knee    3. Primary osteoarthritis of one knee, left      Pertinent Medical History:   Past Medical History:   Diagnosis Date    Alopecia areata     Anxiety     Depression     Hyperlipidemia     Nausea & vomiting     Obesity, Class III, BMI 40-49.9 (morbid obesity) (HCC)     Osteoarthritis     PVC (premature ventricular contraction)     Sleep apnea     CPAP    Thyroid disease       Precautions:  falls, full weight bearing: L LE d/t TKA   Pain Scale: Numeric Rate: 4/10 pain in L knee; Nursing notified.     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 PLOF: independent with BADL and independent with IADL, pt ambulated with no device   Equipment owned: cane, standard walker, rollator, grab bars  Cognition: A&O x 4; follows 3 step directions. good  Problem solving skills   good  Memory    good  Sequencing   good  Judgement/safety  Communication: intact   Visual perceptual skills: intact     Glasses: yes   Edema: yes, surgical extremity     Sensation: intact   Hand Dominance:  Left     X Right     Left Right Comment   Passive range of motion [facility]     Active range of motion [facility]     Muscle Grade 4/5 4/5    /pinch Strength Intact  Intact       Functional Assessment:   Initial Evaluation Status Date:   4/29/2021 Treatment Status  Date: STGs = LTGs  Time frame: 5 - 14 days   Feeding Independent   Independent    Grooming Supervision   Independent    UB Dressing Supervision   Independent    LB Dressing Minimal assist to doff and don socks without the use of adaptive equipment. Pt instructed in use of adaptive equipment prior to use  Pt instructed on lower body dressing techniques. Modified [place] to doff socks with reacher and don socks with sock aide while seated in bedside chair  Independent    Bathing Minimal Assist  Independent    Toileting Supervision   Independent    Bed Mobility  Facilitated Supine to sit: Supervision to use B UE to guide L LE off bed. Scooting:Supervision  Sit to supine: N/T as pt was up in chair    Rolling: Supervision  Pt instructed on proper positioning of L LE to prevent contractures  Supine to sit: Independent   Scooting:Independent  Sit to supine: Independent   Rolling: Independent   Functional Transfers Supervision from EOB to wheeled walker. Supervision for transfer from standing to bedside chair. CLINIC:   Supervision for transfer to and from bedside commode over toilet. Supervision for transfer to and from car. Minimal verbal cues to technique  Minimal assist to set over tub x 2 reps while holding onto grab bar. Transfer training with verbal cues for hand placement throughout session to improve safety. Independent    Functional Mobility Minimal assist progressing to supervision with wheeled walker to improve balance to and from clinic, verbal cues for walker sequence and safety. One seated rest break taken d/t lightheadedness and nausea. Modified [place]    Activity Tolerance fair  good     Balance:   Sitting balance at EOB, various surfaces of clinic, and bedside chair to increase dynamic sitting balance and activities tolerance with Supervision     Standing fair with wheeled walker to improve balance   Endurance: fair     Comments: Upon arrival to the room the patient was supine. At end of the session, the patient was returned to room and seated in the bedside chair. Call light and phone within reach. Pt required verbal cues and instruction as noted above for safe facilitation and completion of tasks. Therapist provided skilled monitoring of the patient's response during treatment session. Prior to and at the end of session, environmental modifications/line management completed for patients safety and efficiency of treatment session. OT services provided to include instruction/training on safety and adapted techniques for completion of transfers and ADL's. Overall, the patient demonstrates difficulties with completion of BADL's and IADL's. Factors contributing to these difficulties includes decreased endurance and generalized weakness. Pt would benefit from continued skilled OT to increase independence with BADL's and IADL's. Treatment:    Bed mobility: Facilitated bed mobility with cues for proper body mechanics and sequencing to prepare for ADL completion.   Functional transfers: Facilitated transfers from various surfaces with cues for body alignment, safety and hand placement. ADL completion: Self-care retraining for the above-mentioned ADLs; training on proper hand placement, safety technique, sequencing, and energy conservation techniques. Postural Balance: Sitting and standing balance retraining to improve righting reactions with postural changes during ADLs. Skilled positioning: Proper positioning to improve interaction with environment, overall functioning and decrease/prevent edema and contractures. Evaluation Complexity:   · Low Complexity  · History: Brief history including review of medical records relating to the problem  · Exam: 1-3 performance Deficits  · Assistance/Modification: No assistance or modifications required to perform tasks. No comorbities affecting occupational performance.     Assessment of current deficits:   Functional mobility [x]        ADLs [x]        Strength [x]      Cognition []  Functional transfers  [x]       IADLs [x]        Safety Awareness []      Endurance [x]  Fine Motor Coordination []       Balance [x]       Vision/perception []     Sensation []   Gross Motor Coordination []       ROM []         Delirium []                          Motor Control []    Plan of Care: OT 1-3x/week for 5-7 days PRN   Instruction/training on adapted ADL techniques and AE recommendations to increased functional independence with precautions   Functional transfer/mobility training/DME recommendations for increased independence, safety, and fall prevention    Therapeutic activity to facilitate/challenge dynamic balance, standing tolerance, fine motor dexterity/in-hand manipulation for increased independence with ALD's    Functional Mobility Training   Training on energy conservation techniques/strategies to improve independence/tolerance for self care routine   Positioning to Improve Functional [place], Safety, and Skin Integrity   Patient/family education to increase follow through with safety techniques and functional independence

## 2021-04-29 NOTE — PLAN OF CARE
Problem: Falls - Risk of:  Goal: Will remain free from falls  Description: Will remain free from falls  4/28/2021 2134 by Amanda Martinez RN  Outcome: Met This Shift     Problem: Pain:  Goal: Pain level will decrease  Description: Pain level will decrease  4/28/2021 2134 by Amanda Martinez RN  Outcome: Met This Shift

## 2021-04-29 NOTE — DISCHARGE SUMMARY
Internal Medicine Progress Note     [facility]     Deshawn Zambrano. Sidra Adame., F.A.C.OKenroyI. Jackalyn Runner, D.O., CORWIN Silva D.O. Amaris Guan, MSN, APRN, NP-C  Ana Rosa Duvall. Dora Melendez, MSN, APRN-CNP       Internal Medicine  Discharge Summary    NAME: Faheem Ureña  :  1960  MRN:  [id number]  PCP:Bill Glynn DO  ADMITTED: 2021      DISCHARGED: 21    ADMITTING PHYSICIAN: Kristine Blanco DO    CONSULTANT(S):   IP CONSULT TO SOCIAL WORK  IP CONSULT TO PRIMARY CARE PROVIDER  IP CONSULT TO HOME CARE NEEDS     ADMITTING DIAGNOSIS:   Primary osteoarthritis of one knee, left [M17.12]  Status post total left knee replacement [Z96.652]     DISCHARGE DIAGNOSES:   1. Status post left knee replacement in the setting of longstanding osteoarthritis  2. Hyperlipidemia  3. Hypothyroidism  4. Generalized anxiety and depression    BRIEF HISTORY OF PRESENT ILLNESS:   Rosalba Parker is a 60-year-old female who was evaluated postoperatively in the recovery room after undergoing left knee arthroplasty. Patient describes a longstanding history of osteoarthritis limiting her ability to complete activities of daily living. We were asked to provide consultation for medical management. We discussed her past medical history and medication regimen.   We will follow along with the orthopedic team.    LABS[de-identified]  Lab Results   Component Value Date    WBC 7.7 2021    HGB 11.0 (L) 2021    HCT 36.1 2021     2021     2021    K 4.2 2021     2021    CREATININE 0.8 2021    BUN 8 2021    CO2 27 2021    GLUCOSE 118 (H) 2021    ALT 24 2021    AST 21 2021    INR 1.0 2021     Lab Results   Component Value Date    INR 1.0 2021    INR 1.0 2017    INR 1.0 12/10/2016    PROTIME 12.1 2021    PROTIME 11.4 2017    PROTIME 11.8 12/10/2016      Lab Results   Component Value Date    TSH 0.851 02/04/2021     Lab Results   Component Value Date    TRIG 241 (H) 02/04/2021    TRIG 119 10/21/2020    TRIG 197 (H) 12/09/2019     Lab Results   Component Value Date    HDL 44 02/04/2021    HDL 50 10/21/2020    HDL 45 12/09/2019     Lab Results   Component Value Date    LDLCALC 124 (H) 02/04/2021    LDLCALC 100 (H) 10/21/2020    LDLCALC 112 (H) 12/09/2019     Lab Results   Component Value Date    LABA1C 5.7 (H) 02/04/2021       IMAGING:  Xr Chest (2 Vw)    Result Date: 4/23/2021  EXAMINATION: TWO XRAY VIEWS OF THE CHEST 4/23/2021 10:41 am COMPARISON: December 9, 2019 HISTORY: ORDERING SYSTEM PROVIDED HISTORY: Primary osteoarthritis of left knee FINDINGS: The lungs are without acute focal process. There is no effusion or pneumothorax. The cardiomediastinal silhouette is without acute process. The osseous structures are without acute process. No acute process. Xr Knee Left (1-2 Views)    Result Date: 4/28/2021  EXAMINATION: TWO XRAY VIEWS OF THE LEFT KNEE 4/28/2021 10:09 am COMPARISON: July 6, 2020 HISTORY: ORDERING SYSTEM PROVIDED HISTORY: POSTOP TECHNOLOGIST PROVIDED HISTORY: Reason for exam:->POSTOP FINDINGS: Recently placed left the arthroplasty is aligned anatomically. No unexpected findings. Anatomically allied left knee arthroplasty with no unexpected findings. HOSPITAL COURSE:   Niranjan Suazo tolerated orthopedic intervention without complication. She worked well with the therapy teams. She did develop some mild bleeding but this was addressed by the orthopedic team.  Home medications were resumed. She was ultimately deemed acceptable for discharge home with appropriate outpatient follow-up. BRIEF PHYSICAL EXAMINATION AND LABORATORIES ON DAY OF DISCHARGE:  VITALS:  BP (!) 116/59   Pulse 68   Temp 97.6 °F (36.4 °C)   Resp 17   Ht 5' 6\" (1.676 m)   Wt 273 lb (123.8 kg)   LMP 10/13/2016   SpO2 94%   BMI 44.06 kg/m²     HEENT:  PERRLA. EOMI. Sclera clear. Buccal mucosa moist.    Neck:  Supple. Trachea midline. No thyromegaly. No JVD. No bruits. Heart:  Rhythm regular, rate controlled. No murmurs. Lungs:  Symmetrical. Clear to auscultation bilaterally. No wheezes. No rhonchi. No rales. Abdomen: Soft. Non-tender. Non-distended. Bowel sounds positive. No organomegaly or masses. No pain on palpation    Extremities:  Peripheral pulses present. Postoperative dressings were in place. Neurologic:  Alert x 3. No focal deficit. Cranial nerves grossly intact. Skin:  No petechia. No hemorrhage. No wounds. DISPOSITION:  The patient's condition is good. At this time the patient is without objective evidence of an acute process requiring continuing hospitalization or inpatient management. They are stable for discharge with outpatient follow-up. I have spoken with the patient and discussed the results of the current hospitalization, in addition to providing specific details for the plan of care and counseling regarding the diagnosis and prognosis. The plan has been discussed in detail and they are aware of the specific conditions for emergent return, as well as the importance of follow-up. Their questions are answered at this time and they are agreeable with the plan for discharge to home    DISCHARGE MEDICATIONS:    Mode David   Home Medication Instructions EVW:[id number]    Printed on:04/29/21 [id number]   Medication Information                      acetaminophen-codeine (TYLENOL/CODEINE #4) 300-60 MG per tablet  Take 1 tablet by mouth every 6 hours as needed for Pain. aspirin 325 MG EC tablet  Take 1 tablet by mouth 2 times daily for 28 days             atenolol (TENORMIN) 50 MG tablet  Take 1 tablet by mouth nightly             Cholecalciferol (VITAMIN D-3) 5000 UNITS TABS  Take  by mouth.              diclofenac (VOLTAREN) 50 MG EC tablet  TAKE 1 TABLET BY MOUTH 2 TIMES A DAY WITH MEALS             diclofenac 1 % CREA  Apply 2 g topically 4 times daily To affected area             famotidine (PEPCID) 20 MG tablet  Take 1 tablet by mouth 2 times daily             gabapentin (NEURONTIN) 100 MG capsule  Take 1 capsule by mouth 3 times daily for 30 days. Intended supply: 30 days             hydrOXYzine (VISTARIL) 25 MG capsule  Take 1 capsule by mouth 4 times daily as needed for Anxiety             levothyroxine (SYNTHROID) 50 MCG tablet  Take 1 tablet by mouth Daily             naproxen (NAPROSYN) 500 MG tablet  Take 1 tablet by mouth 2 times daily (with meals)             nitrofurantoin, macrocrystal-monohydrate, (MACROBID) 100 MG capsule  Take 1 capsule by mouth 2 times daily for 7 days             sertraline (ZOLOFT) 100 MG tablet  Take 1 tablet by mouth daily Indications: takes 2 tablets at hs Take 2 tablet at hs po             simvastatin (ZOCOR) 20 MG tablet  Take 1 tablet by mouth nightly             tiZANidine (ZANAFLEX) 4 MG tablet  Take 1 tablet by mouth every 8 hours as needed (MUSCLE SPASMS)             traMADol (ULTRAM) 50 MG tablet  Take 1 tablet by mouth every 6 hours as needed for Pain for up to 7 days. Intended supply: 3 days. Take lowest dose possible to manage pain             traZODone (DESYREL) 150 MG tablet  Take 1 tablet by mouth nightly                 FOLLOW UP/INSTRUCTIONS:  · This patient is instructed to follow-up with her primary care physician. · Patient is instructed to follow-up with the consults listed above as directed by them. · she is instructed to resume home medications and take new medications as indicated in the list above. · If the patient has a recurrence of symptoms, she is instructed to go to the ED. Preparing for this patient's discharge, including paperwork, orders, instructions, and meeting with patient did require > 40 minutes.     Roxy Fish DO   4/29/2021  6:56 AM

## 2021-04-29 NOTE — PROGRESS NOTES
Physical Therapy Treatment Note    Room #:  0320/0320-01  Patient Name: Emilie Vargas  YOB: 1960  MRN: [id number]    Referring Provider:  Ana Carbone DO     Date of Service: 2021    Evaluating Physical Therapist:  Darell Basilio, PT  #[id number]       Diagnosis:   Primary osteoarthritis of one knee, left [M17.12]  Status post total left knee replacement [Z96.652]  Primary osteoarthritis of left knee [M17.12]    status post Left Total knee arthroplasty (TKA)    Patient Active Problem List   Diagnosis    Mechanical low back pain    DDD (degenerative disc disease), lumbar    Facet syndrome, lumbar    Sacroiliitis, right    Protruded lumbar disc    degenerative Osteoarthritis of cervical spine    Cervical facet syndrome    Cervical radiculopathy    Protruded cervical disc    Shoulder impingement    GERD (gastroesophageal reflux disease)    Thyroid disease    Sleep apnea    PVC (premature ventricular contraction)    Obesity, Class III, BMI 40-49.9 (morbid obesity) ([address]    Hyperlipidemia    Depression    Left Achilles tendinitis    Primary osteoarthritis of one knee, left    S/P knee replacement    Status post total left knee replacement    Primary osteoarthritis of left knee        Tentative placement recommendation: Home Health PT 5 days a week  Equipment recommendation: Patient has needed equipment       Prior Level of Function: Patient ambulated independently    Rehab Potential: good    for baseline    Past medical history:  Past Medical History:   Diagnosis Date    Alopecia areata     Anxiety     Depression     Hyperlipidemia     Nausea & vomiting     Obesity, Class III, BMI 40-49.9 (morbid obesity) ([address]     Osteoarthritis     PVC (premature ventricular contraction)     Sleep apnea     CPAP    Thyroid disease      Past Surgical History:   Procedure Laterality Date     SECTION      x2    CHOLECYSTECTOMY      COLONOSCOPY  14    bx done Dr Theo Elena ECHO COMPL W DOP COLOR FLOW  12/28/2011         KNEE ARTHROSCOPY      NERVE BLOCK Bilateral 3/5/14    lumbar facet #1    NERVE BLOCK  03/12/14    paravertebral facet bilateral lumbar #2    NERVE BLOCK N/A 4 9 14    cerv #1    NERVE BLOCK N/A 4/16/2014    cervical epidural  #2    NERVE BLOCK N/A 4 23 14    cerv #3    NERVE BLOCK Bilateral 12/21/2020    intra-articular facet joint injection     OTHER SURGICAL HISTORY  07/16/14    Radiofrequency right lumbar L3-4, L4-5, L5-S1    OTHER SURGICAL HISTORY Left 08 18 2014    lumbar radiofrequency    PAIN MANAGEMENT PROCEDURE Bilateral 12/21/2020    BILATERAL INTRA-ARTICULAR FACET JOINT INJECTION WITH FLUOROSCOPIC GUIDANCE AT L3-4, L4-5, L5-S1 (CPT 41116, 45622) performed by Antoni Dao DO at [address]  12/16/14    ashly ,bx sm bowel  Dr Phelps Counter       Precautions:  , falls and alarm , left lower extremity WBAT (weight bearing as tolerated) urinary incontinence     SUBJECTIVE:    Social history: Patient lives with spouse   in a ranch home  with 1+3 steps  to enter      Equipment owned: Cane and Standard Walker,       [facility]   How much difficulty turning over in bed?: None  How much difficulty sitting down on / standing up from a chair with arms?: A Little  How much difficulty moving from lying on back to sitting on side of bed?: None  How much help from another person moving to and from a bed to a chair?: A Little  How much help from another person needed to walk in hospital room?: A Little  How much help from another person for climbing 3-5 steps with a railing?: A Little  AM-PAC Inpatient Mobility Raw Score : 20  AM-PAC Inpatient T-Scale Score : 47.67  Mobility Inpatient CMS 0-100% Score: 35.83  Mobility Inpatient CMS G-Code Modifier : [address] cleared patient for PT treatment.     OBJECTIVE:   Initial Evaluation  Date: 4/28/2021 Treatment Date:  4/29/2021 Short Term/ Long Term   Goals   Was pt agreeable to Eval/treatment? Yes yes    Pain Level  2/10  Left knee   5/10  left knee     Bed Mobility  Rolling: Independent    Supine to sit: Supervision     Sit to supine: Supervision     Scooting: Supervision    Rolling: Supervision    Supine to sit: Supervision    Sit to supine: Supervision    Scooting: Supervision     Rolling: Independent    Supine to sit: Independent    Sit to supine: Independent    Scooting: Independent     Transfers Sit to stand: Minimal assist of 1   Sit to stand: Minimal assist of 1 cues for hand placement     Sit to stand: Modified Independent     Ambulation    2x4 feet and 15 feet using  wheeled walker with Minimal assist of 1   distractible and impulsive; no knee buckling 2 x 40 feet using  wheeled walker with Minimal assist of 1   V/C for left knee extension in stance phase, upright posture, walker approximation, and safety. 100 feet using  wheeled walker with Modified Independent    Stair negotiation: ascended and descended   Not assessed    14 steps using 1 rail and 1 crutch with Supervision. V/C for sequencing and safety.    3 steps with rail and crutch vs cane independent    ROM Within functional limits except Left knee  -0-90°    Increase range of motion 10% of affected joints    Strength Within functional limits  except  Left lower extremity 3/5  Within functional limits   Balance Sitting EOB:  good     Dynamic Standing:  fair    wheeled walker  Sitting EOB: good   Dynamic Standing: fair wheeled walker   Sitting EOB:  good    Dynamic Standing:  good wheeled walker      Patient is Alert & Oriented x person, place, time and situation and follows directions    Sensation:  Patient denies numbness and tingling to   Edema:  yes jun intact with dime size drainage; patient swung legs over bed and fully flexed knee and immediately drainage spread to 3/4 of the rectangular jun dressing; nursing made aware and cleared pt for trf and gait training prognosis, and plan of care. ASSESSMENT: Patient able to progress with increased distance with no left knee buckling noted but needing cueing for left knee extension in stance phase, sequencing, and safety. Pt able to perform stairs with no loss of balance but needed cueing for sequencing and safety. PLAN:    Physical Therapy care will be provided in accordance with the objectives noted above. Exercises and functional mobility practice will be used as well as appropriate assistive devices or modalities to obtain goals. Patient and family education will also be administered as needed.    -Bed Mobility: Lower extremity exercises   -Standing Balance: Perform strengthening exercises in standing to promote motor control with or without upper extremity support   -Transfers: Provide instruction on proper hand and foot position for adequate transfer of weight onto lower extremities and use of gait device, Cues for hand placement, technique and safety and Provide stabilization to prevent fall   -Gait: Gait training, Standing activities to improve: base of support, weight shift, weight bearing  and Exercises to improve hip and knee control   -Endurance: Utilize Supervised activities to increase level of endurance to allow for safe functional mobility including transfers and gait     Frequency of treatments: Patient will be seen  twice daily  for therapeutic exercise, functional retraining, endurance activities, balance exercises, family and patient education. Time in 10:30  Time out 11:15    Total Treatment Time  45 minutes        CPT codes:    Therapeutic activities (01280)   33 minutes  2 unit(s)  Therapeutic exercises (20349)   12 minutes  1 unit(s)    Jeanmarei Wallace  [facility]  LIC # [id number]

## 2021-05-07 ENCOUNTER — OFFICE VISIT (OUTPATIENT)
Dept: ORTHOPEDIC SURGERY | Age: 61
End: 2021-05-07

## 2021-05-07 VITALS — BODY MASS INDEX: 43.87 KG/M2 | HEIGHT: 66 IN | WEIGHT: 273 LBS | TEMPERATURE: 98.6 F

## 2021-05-07 DIAGNOSIS — Z96.652 S/P TOTAL KNEE ARTHROPLASTY, LEFT: Primary | ICD-10-CM

## 2021-05-07 PROCEDURE — 99024 POSTOP FOLLOW-UP VISIT: CPT | Performed by: NURSE PRACTITIONER

## 2021-05-07 NOTE — PROGRESS NOTES
"Subjective:       Patient ID: Tena Sorto is a 56 y.o. female.    Vitals:  height is 5' 2" (1.575 m) and weight is 65.8 kg (145 lb). Her tympanic temperature is 98.7 °F (37.1 °C). Her blood pressure is 134/83 and her pulse is 91. Her respiration is 18 and oxygen saturation is 97%.     Chief Complaint: URI    This is a 56 y.o. female who presents today with a chief complaint of headache, sore throat, cough and post nasal drip since yesterday. Took Nyquil last night without relief.      URI    This is a new problem. The current episode started yesterday. The problem has been gradually worsening. There has been no fever. Associated symptoms include congestion, coughing, headaches, sinus pain, sneezing and a sore throat. Pertinent negatives include no abdominal pain, chest pain, diarrhea, dysuria, ear pain, nausea, plugged ear sensation, rash, rhinorrhea, vomiting or wheezing. Associated symptoms comments: Headache is frontal sinus to both temporal lobes. Bandlike. 5/10 pain.. She has tried acetaminophen for the symptoms. The treatment provided no relief.       Constitution: Negative for chills, sweating, fatigue, fever and generalized weakness.   HENT: Positive for congestion, postnasal drip, sinus pain, sinus pressure and sore throat. Negative for ear pain, trouble swallowing and voice change.    Neck: Negative for painful lymph nodes.   Cardiovascular: Negative for chest pain.   Eyes: Negative for eye redness.   Respiratory: Positive for cough. Negative for chest tightness, sputum production, bloody sputum, COPD, shortness of breath, stridor, wheezing and asthma.    Gastrointestinal: Negative for abdominal pain, nausea, vomiting, constipation and diarrhea.   Genitourinary: Negative for dysuria.   Musculoskeletal: Negative for muscle ache.   Skin: Negative for rash.   Allergic/Immunologic: Positive for sneezing. Negative for environmental allergies, seasonal allergies, asthma and flu shot.   Neurological: Positive " Patient presents today for wound check, she is 10 days post Left TKA. She called our office bc she had some redness and wanted it looked at      Objective:     General: alert, appears stated age and cooperative   Wound: Wound clean and dry no evidence of infection. , No significant Erythema, No Edema and No Drainage   Motion: Flexion: 0 to 100 Degrees   DVT Exam: No evidence of DVT seen on physical exam.  Negative Fabiola's sign. No cords or calf tenderness. No significant calf/ankle edema. I  Assessment:     Encounter Diagnosis   Name Primary?  S/P total knee arthroplasty, left Yes      Doing well postoperatively.      Plan:     Normal appearance 10 days post TKA, no drainage or worrisome erythema  Island dressing placed, instructed to do daily dressing changes and as needed  She will call if anything changes and fu on may 13 as previously scheduled for headaches. Negative for dizziness, light-headedness and altered mental status.   Hematologic/Lymphatic: Negative for swollen lymph nodes.   Psychiatric/Behavioral: Negative for altered mental status.       Objective:      Physical Exam   Constitutional: She is oriented to person, place, and time. She appears well-developed and well-nourished. She is cooperative.  Non-toxic appearance. She does not appear ill. No distress.   HENT:   Head: Normocephalic and atraumatic.   Right Ear: Hearing, tympanic membrane, external ear and ear canal normal. No drainage or tenderness. Tympanic membrane is not erythematous and not bulging. No middle ear effusion. No decreased hearing is noted.   Left Ear: Hearing, external ear and ear canal normal. No drainage or tenderness. Tympanic membrane is not erythematous and not bulging. A middle ear effusion is present. No decreased hearing is noted.   Nose: Rhinorrhea present. No mucosal edema or nasal deformity. No epistaxis. Right sinus exhibits no maxillary sinus tenderness and no frontal sinus tenderness. Left sinus exhibits no maxillary sinus tenderness and no frontal sinus tenderness.   Mouth/Throat: Uvula is midline and mucous membranes are normal. No trismus in the jaw. Normal dentition. No uvula swelling. Posterior oropharyngeal erythema present. No oropharyngeal exudate or posterior oropharyngeal edema. Tonsils are 1+ on the right. Tonsils are 1+ on the left. No tonsillar exudate.       Eyes: Conjunctivae and lids are normal. Pupils are equal, round, and reactive to light. Right eye exhibits no discharge. Left eye exhibits no discharge. No scleral icterus.   Sclera clear bilat   Neck: Trachea normal, full passive range of motion without pain and phonation normal. Neck supple.   Cardiovascular: Normal rate, regular rhythm, normal heart sounds, intact distal pulses and normal pulses.   Pulmonary/Chest: Effort normal and breath sounds normal. No respiratory distress. She has no  wheezes.   Abdominal: Soft. Normal appearance and bowel sounds are normal. She exhibits no distension. There is no tenderness.   Musculoskeletal: Normal range of motion. She exhibits no edema or deformity.   Lymphadenopathy:     She has no cervical adenopathy.   Neurological: She is alert and oriented to person, place, and time. Coordination normal.   Skin: Skin is warm, dry and intact. She is not diaphoretic. No pallor.   Psychiatric: She has a normal mood and affect. Her speech is normal and behavior is normal. Judgment normal. Cognition and memory are normal.   Nursing note and vitals reviewed.      Results for orders placed or performed in visit on 02/21/19   POCT rapid strep A   Result Value Ref Range    Rapid Strep A Screen Negative Negative     Acceptable Yes          Strep was negative. Discussed option of throat culture with patient. She voiced understanding. She does not wish to do a culture at this time. She will wait and see if symptoms improve with symptomatic treatment over the next few days. She will return to  or follow up with PCP if symptoms do not improve or if they worsen.     Assessment:       1. Pharyngitis, unspecified etiology    2. Sore throat        Plan:         Pharyngitis, unspecified etiology  -     (Magic mouthwash) 1:1:1 Benadryl 12.5mg/5ml liq, aluminum & magnesium hydroxide-simehticone (Maalox), lidocaine viscous 2%; Swish and spit 10 mLs every 4 (four) hours as needed. for mouth sores  Dispense: 1 Bottle; Refill: 0    Sore throat  -     POCT rapid strep A      Patient Instructions     Pharyngitis   If your condition worsens or fails to improve we recommend that you receive another evaluation at the ER immediately or contact your PCP to discuss your concerns or return here. You must understand that you've received an urgent care treatment only and that you may be released before all your medical problems are known or treated. You the patient will arrange for  followup care as instructed.   The majority of all sore throats or tonsillitis are viral and antibiotics will not treat this.   - Drink plenty of cool liquids while avoid any beverage or food that can irritate your throat (acidic, spicy or salty foods).  - If the strep culture was done and returns negative in 3-5 days and you are still having a sore throat, you may need to get a mono spot test done or repeated.   - Tylenol or ibuprofen for pain may help as long as you are not allergic to these meds or have a medical condition such as stomach ulcers, liver or kidney disease or taking blood thinners etc that would   prevent you from using these medications.   - Rest and fluids will help as well.   - Over the counter Zyrtec or claritin to help with post nasal drip.   - Flonase nasal spray over the counter can help with nasal congestion.           Self-Care for Sore Throats    Sore throats happen for many reasons, such as colds, allergies, and infections caused by viruses or bacteria. In any case, your throat becomes red and sore. Your goal for self-care is to reduce your discomfort while giving your throat a chance to heal.  Moisten and soothe your throat  Tips include the following:  · Try a sip of water first thing after waking up.  · Keep your throat moist by drinking 6 or more glasses of clear liquids every day.  · Run a cool-air humidifier in your room overnight.  · Avoid cigarette smoke.   · Suck on throat lozenges, cough drops, hard candy, ice chips, or frozen fruit-juice bars. Use the sugar-free versions if your diet or medical condition requires them.  Gargle to ease irritation  Gargling every hour or 2 can ease irritation. Try gargling with 1 of these solutions:  · 1/4 teaspoon of salt in 1/2 cup of warm water  · An over-the-counter anesthetic gargle  Use medicine for more relief  Over-the-counter medicine can reduce sore throat symptoms. Ask your pharmacist if you have questions about which medicine to  use:  · Ease pain with anesthetic sprays. Aspirin or an aspirin substitute also helps. Remember, never give aspirin to anyone 18 or younger, or if you are already taking blood thinners.   · For sore throats caused by allergies, try antihistamines to block the allergic reaction.  · Remember: unless a sore throat is caused by a bacterial infection, antibiotics wont help you.  Prevent future sore throats  Prevention tips include the following:  · Stop smoking or reduce contact with secondhand smoke. Smoke irritates the tender throat lining.  · Limit contact with pets and with allergy-causing substances, such as pollen and mold.  · When youre around someone with a sore throat or cold, wash your hands often to keep viruses or bacteria from spreading.  · Dont strain your vocal cords.  Call your healthcare provider  Contact your healthcare provider if you have:  · A temperature over 101°F (38.3°C)  · White spots on the throat  · Great difficulty swallowing  · Trouble breathing  · A skin rash  · Recent exposure to someone else with strep bacteria  · Severe hoarseness and swollen glands in the neck or jaw   Date Last Reviewed: 8/1/2016  © 7291-5054 [employer]. [address] [place] [address]. All rights reserved. This information is not intended as a substitute for professional medical care. Always follow your healthcare professional's instructions.        When You Have a Sore Throat    A sore throat can be painful. There are many reasons why you may have a sore throat. Your healthcare provider will work with you to find the cause of your sore throat. He or she will also find the best treatment for you.  What causes a sore throat?  Sore throats can be caused or worsened by:  · Cold or flu viruses  · Bacteria  · Irritants such as tobacco smoke or air pollution  · Acid reflux  A healthy throat  The tonsils are on the sides of the throat near the base of the tongue. They collect viruses and bacteria  and help fight infection. The throat (pharynx) is the passage for air. Mucus from the nasal cavity also moves down the passage.  An inflamed throat  The tonsils and pharynx can become inflamed due to a cold or flu virus. Postnasal drip (excess mucus draining from the nasal cavity) can irritate the throat. It can also make the throat or tonsils more likely to be infected by bacteria. Severe, untreated tonsillitis in children or adults can cause a pocket of pus (abscess) to form near the tonsil.  Your evaluation  A medical evaluation can help find the cause of your sore throat. It can also help your healthcare provider choose the best treatment for you. The evaluation may include a health history, physical exam, and diagnostic tests.  Health history  Your healthcare provider may ask you the following:  · How long has the sore throat lasted and how have you been treating it?  · Do you have any other symptoms, such as body aches, fever, or cough?  · Does your sore throat recur? If so, how often? How many days of school or work have you missed because of a sore throat?  · Do you have trouble eating or swallowing?  · Have you been told that you snore or have other sleep problems?  · Do you have bad breath?  · Do you cough up bad-tasting mucus?  Physical exam  During the exam, your healthcare provider checks your ears, nose, and throat for problems. He or she also checks for swelling in the neck, and may listen to your chest.  Possible tests  Other tests your healthcare provider may perform include:  · A throat swab to check for bacteria such as streptococcus (the bacteria that causes strep throat)  · A blood test to check for mononucleosis (a viral infection)  · A chest X-ray to rule out pneumonia, especially if you have a cough  Treating a sore throat  Treatment depends on many factors. What is the likely cause? Is the problem recent? Does it keep coming back? In many cases, the best thing to do is to treat the symptoms,  "rest, and let the problem heal itself. Antibiotics may help clear up some bacterial infections. For cases of severe or recurring tonsillitis, the tonsils may need to be removed.  Relieving your symptoms  · Dont smoke, and avoid secondhand smoke.  · For children, try throat sprays or Popsicles. Adults and older children may try lozenges.  · Drink warm liquids to soothe the throat and help thin mucus. Avoid alcohol, spicy foods, and acidic drinks such as orange juice. These can irritate the throat.  · Gargle with warm saltwater (1 teaspoon of salt to 8 ounces of warm water).  · Use a humidifier to keep air moist and relieve throat dryness.  · Try over-the-counter pain relievers such as acetaminophen or ibuprofen. Use as directed, and dont exceed the recommended dose. Dont give aspirin to children.   Are antibiotics needed?  If your sore throat is due to a bacterial infection, antibiotics may speed healing and prevent complications. Although group A streptococcus ("strep throat" or GAS) is the major treatable infection for a sore throat, GAS causes only 5% to 15% of sore throats in adults who seek medical care. Most sore throats are caused by cold or flu viruses. And antibiotics dont treat viral illness. In fact, using antibiotics when theyre not needed may produce bacteria that are harder to kill. Your healthcare provider will prescribe antibiotics only if he or she thinks they are likely to help.  If antibiotics are prescribed  Take the medicine exactly as directed. Be sure to finish your prescription even if youre feeling better. And be sure to ask your healthcare provider or pharmacist what side effects are common and what to do about them.  Is surgery needed?  In some cases, tonsils need to be removed. This is often done as outpatient (same-day) surgery. Your healthcare provider may advise removing the tonsils in cases of:  · Several severe bouts of tonsillitis in a year. Severe episodes include those that " lead to missed days of school or work, or that need to be treated with antibiotics.  · Tonsillitis that causes breathing problems during sleep  · Tonsillitis caused by food particles collecting in pouches in the tonsils (cryptic tonsillitis)  Call your healthcare provider if any of the following occur:  · Symptoms worsen, or new symptoms develop.  · Swollen tonsils make breathing difficult.  · The pain is severe enough to keep you from drinking liquids.  · A skin rash, hives, or wheezing develops. Any of these could signal an allergic reaction to antibiotics.  · Symptoms dont improve within a week.  · Symptoms dont improve within 2 to 3 days of starting antibiotics.   Date Last Reviewed: 10/1/2016  © 8164-5153 [employer]. [address] [place] [address], [place] [address]. All rights reserved. This information is not intended as a substitute for professional medical care. Always follow your healthcare professional's instructions.

## 2021-05-11 DIAGNOSIS — Z96.652 S/P TOTAL KNEE ARTHROPLASTY, LEFT: Primary | ICD-10-CM

## 2021-05-13 ENCOUNTER — OFFICE VISIT (OUTPATIENT)
Dept: ORTHOPEDIC SURGERY | Age: 61
End: 2021-05-13

## 2021-05-13 VITALS — BODY MASS INDEX: 43.87 KG/M2 | WEIGHT: 273 LBS | TEMPERATURE: 98 F | HEIGHT: 66 IN

## 2021-05-13 DIAGNOSIS — Z96.652 S/P TOTAL KNEE ARTHROPLASTY, LEFT: Primary | ICD-10-CM

## 2021-05-13 PROCEDURE — 99024 POSTOP FOLLOW-UP VISIT: CPT | Performed by: ORTHOPAEDIC SURGERY

## 2021-05-13 NOTE — PROGRESS NOTES
Post-Operative week: 2 Status Post left Total Knee Arthroplasty, surgery date 4/28/20  Systemic or Specific Complaints:No Complaints    Objective:     General: alert, appears stated age and cooperative   Wound: Wound clean and dry no evidence of infection. , No Erythema and No Edema   Motion: Flexion: 0 to 100 Degrees   DVT Exam: No evidence of DVT seen on physical exam.  Negative Fabiola's sign. No cords or calf tenderness. Xrays:  No signs of fracture, there is good alignment, and no signs of aseptic loosening. Radiographic findings reviewed with patient    Assessment:     Encounter Diagnosis   Name Primary?  S/P total knee arthroplasty, left Yes      Doing well postoperatively. Plan:     Continues current post-op course  Continues current post-op course, staples were removed at today's appt  Patient is to continue taking enteric coated aspirin 81 mg, 1 tablet twice daily. Patient is to continue wearing FARIHA hose until next follow up visit but wear during the day and remove at night. Outpatient physical therapy is to begin immediately. No bathing/swimming/hot tub for two weeks or until incision is completely closed. We will see the patient back in 3 weeks for repeat xray and evaluation.

## 2021-05-18 ENCOUNTER — EVALUATION (OUTPATIENT)
Dept: PHYSICAL THERAPY | Age: 61
End: 2021-05-18
Payer: COMMERCIAL

## 2021-05-18 DIAGNOSIS — Z96.652 S/P TOTAL KNEE ARTHROPLASTY, LEFT: Primary | ICD-10-CM

## 2021-05-18 PROCEDURE — 97110 THERAPEUTIC EXERCISES: CPT | Performed by: PHYSICAL THERAPIST

## 2021-05-18 PROCEDURE — 97162 PT EVAL MOD COMPLEX 30 MIN: CPT | Performed by: PHYSICAL THERAPIST

## 2021-05-18 NOTE — PROGRESS NOTES
Evaluation 30 1   Re-Evaluation     Ther Exercise         TE 15 1   Manual Therapy     MT     Ther Activities        TA     Gait Training          GT     Neuro Re-education NR     Modalities     Non-Billable Service Time     Other     Total Time/Units 45 2

## 2021-05-18 NOTE — PROGRESS NOTES
[facility] OUTPATIENT REHABILITATION  PHYSICAL THERAPY INITIAL EVALUATION         Date:  2021   Patient: Emilie Vargas  : 1960  MRN: [id number]  Referring Provider: DO Kimmy Fleming6 [facility]     Medical Diagnosis:      Diagnosis Orders   1. S/P total knee arthroplasty, left         SUBJECTIVE:     Surgical procedure: L TKA    Date of surgery: 2021    Services provided following surgery: home care    History: TKA following OA. Also has DJD R knee. Chief complaint: edema, decreased motion, weakness, inability / limited ability to use leg, difficulty walking, difficulty with stairs, and limited IADLs (cooking, cleaning, working in yard, limited ability to lift/carry/handle material, limited ability to complete home/outdoor chores/tasks, decreased endurance. Pain:   Current: 0/10       Imaging results:     XR KNEE LEFT (1-2 VIEWS)    Result Date: 2021  EXAMINATION: TWO XRAY VIEWS OF THE LEFT KNEE 2021 8:20 am COMPARISON: 2021 HISTORY: ORDERING SYSTEM PROVIDED HISTORY: S/P total knee arthroplasty, left TECHNOLOGIST PROVIDED HISTORY: Reason for exam:->pain FINDINGS: Moderate joint effusion. Anatomic alignment of left knee arthroplasty. No osseous findings. Anatomically aligned left knee arthroplasty with moderate joint effusion. XR KNEE LEFT (1-2 VIEWS)    Result Date: 2021  EXAMINATION: TWO XRAY VIEWS OF THE LEFT KNEE 2021 10:09 am COMPARISON: 2020 HISTORY: ORDERING SYSTEM PROVIDED HISTORY: POSTOP TECHNOLOGIST PROVIDED HISTORY: Reason for exam:->POSTOP FINDINGS: Recently placed left the arthroplasty is aligned anatomically. No unexpected findings. Anatomically allied left knee arthroplasty with no unexpected findings.        Past Medical History:  Past Medical History:   Diagnosis Date    Alopecia areata     Anxiety     Depression     Hyperlipidemia     Nausea & vomiting     Obesity, Class III, BMI 40-49.9 (morbid obesity) (HCC)     Osteoarthritis     PVC (premature ventricular contraction)     Sleep apnea     CPAP    Thyroid disease      Past Surgical History:   Procedure Laterality Date     SECTION      x2    CHOLECYSTECTOMY      COLONOSCOPY  14    bx done Dr Leo Sumner  2011         KNEE ARTHROSCOPY      NERVE BLOCK Bilateral 3/5/14    lumbar facet #1    NERVE BLOCK  14    paravertebral facet bilateral lumbar #2    NERVE BLOCK N/A 4 9 14    cerv #1    NERVE BLOCK N/A 2014    cervical epidural  #2    NERVE BLOCK N/A 4 23 14    cerv #3    NERVE BLOCK Bilateral 2020    intra-articular facet joint injection     OTHER SURGICAL HISTORY  14    Radiofrequency right lumbar L3-4, L4-5, L5-S1    OTHER SURGICAL HISTORY Left 2014    lumbar radiofrequency    PAIN MANAGEMENT PROCEDURE Bilateral 2020    BILATERAL INTRA-ARTICULAR FACET JOINT INJECTION WITH FLUOROSCOPIC GUIDANCE AT L3-4, L4-5, L5-S1 (CPT R8200217, 39303) performed by Jack Son DO at [facility] Left 2021    LEFT KNEE TOTAL KNEE ARTHROPLASTY [facility]) performed by Rafael Rothman DO at [facility]  14    ashly ,bx sm bowel  Dr Nayeli Mg       Medications:   Current Outpatient Medications   Medication Sig Dispense Refill    ondansetron (ZOFRAN) 4 MG tablet Take 1 tablet by mouth 3 times daily as needed for Nausea or Vomiting 15 tablet 0    gabapentin (NEURONTIN) 100 MG capsule Take 1 capsule by mouth 3 times daily for 30 days.  Intended supply: 30 days 90 capsule 0    aspirin 325 MG EC tablet Take 1 tablet by mouth 2 times daily for 28 days 56 tablet 0    diclofenac (VOLTAREN) 50 MG EC tablet TAKE 1 TABLET BY MOUTH 2 TIMES A DAY WITH MEALS 60 tablet 3    hydrOXYzine (VISTARIL) 25 MG capsule Take 1 capsule by mouth 4 times daily as needed for Anxiety 360 capsule 0    simvastatin (ZOCOR) 20 MG tablet Take 1 tablet by mouth nightly 90 tablet 1    traZODone (DESYREL) 150 MG tablet Take 1 tablet by mouth nightly 90 tablet 1    diclofenac 1 % CREA Apply 2 g topically 4 times daily To affected area 120 g 3    tiZANidine (ZANAFLEX) 4 MG tablet Take 1 tablet by mouth every 8 hours as needed (MUSCLE SPASMS) 90 tablet 1    famotidine (PEPCID) 20 MG tablet Take 1 tablet by mouth 2 times daily 180 tablet 1    atenolol (TENORMIN) 50 MG tablet Take 1 tablet by mouth nightly 90 tablet 2    levothyroxine (SYNTHROID) 50 MCG tablet Take 1 tablet by mouth Daily (Patient taking differently: Take 50 mcg by mouth nightly ) 90 tablet 2    sertraline (ZOLOFT) 100 MG tablet Take 1 tablet by mouth daily Indications: takes 2 tablets at hs Take 2 tablet at hs po (Patient taking differently: Take 100 mg by mouth nightly Indications: takes 2 tablets at hs Take 2 tablet at hs po) 180 tablet 3    naproxen (NAPROSYN) 500 MG tablet Take 1 tablet by mouth 2 times daily (with meals) 180 tablet 1    Cholecalciferol (VITAMIN D-3) 5000 UNITS TABS Take  by mouth. No current facility-administered medications for this visit. 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 Unclear RTW date. Patient Goals: improved endurance, get up and down from floor with grandson    Precautions/Contraindications: recent surgery    OBJECTIVE:     Observations: Well nourished female with normal affect. Rises well from chair. Ambulates with cane. Inspection: Incision edges approximated, no purulent drainage, no redness, no swelling, no tenderness and not hot to touch.       Edema: mild global    Gait: limp L LE, ambulates with cane    Joint/Motion:    Knee:  Right:   AROM: 115° Flexion,  -3° Extension  Left:   AROM: 95° Flexion,  -2° Extension  PROM: 100° Flexion,  -2° Extension    Strength:    Knee:   Right: Flexion 5/5,  Extension 5/5  Left: Flexion 3/5, Extension 3/5    Palpation: Non-tender to palpation      ASSESSMENT     Outcome Measure:   Lower Extremity Functional Scale (LEFS) 56% impairment    Problems:    Min edema   ROM decreased (-2 ext, 95 flex)   Strength decreased (3/5)   Decreased functional ability with walking, stairs, use of left lower extremity, lifting, carrying, engaging with grandchildren from low surfaces    [x] There are no barriers affecting plan of care or recovery    [] Barriers to this patient's plan of care or recovery include:    Domestic Concerns:  [x] No  [] Yes:    Short Term goals (3 weeks)   Min Edema   Increase ROM to 0 ext, 110 flex   Increase Strength to 4/5    Lower Extremity Functional Scale (LEFS) 40% impairment    Long Term goals (6 weeks)   No edema   Increase ROM to 0 ext, 115-120 flex   Increase Strength to 5/5    Able to perform/complete the following functions/tasks: reciprocal pattern on stairs, normal gait no device, perform indoor / outdoor chores without difficulty   Lower Extremity Functional Scale (LEFS) 20% impairment   Independent with Home Exercise Programs    Rehab Potential: [x] Good  [] Fair  [] Poor    PLAN       Treatment Plan:  [x] Therapeutic Exercise  [x] Therapeutic Activity  [x] Neuromuscular Re-education   [x] Gait Training  [x] Balance Training  [] Aerobic conditioning  [] Manual Therapy  [] Massage/Fascial release   [] Work/Sport specific activities    [] Pain Neuroscience [x] Cold/hotpack  [x] Vasocompression  [] Electrical Stimulation  [] Lumbar/Cervical Traction  [] Ultrasound   [] Iontophoresis: 4 mg/mL Dexamethasone Sodium Phosphate 40-80 mAmin        [x] Instruction in HEP      []  Medication allergies reviewed for use of Dexamethasone Sodium Phosphate 4mg/ml  with iontophoresis treatments. Patient is not allergic.       The following CPT codes are likely to be used in the care of this patient: 74529 PT Evaluation: Moderate Complexity , 84306 PT Re-Evaluation , 36935 Therapeutic Exercise , [id number] Neuromuscular Re-Education , 29941 Therapeutic Activities , 15223 Manual Therapy , 65645 Gait Training , 95158 Vasopneumatic Device       Suggested Professional Referral: [x] No  [] Yes:     Patient Education:  [x] Plans/Goals, Risks/Benefits discussed  [x] Home exercise program  Method of Education: [x] Verbal  [x] Demo  [x] Written  Comprehension of Education:  [x] Verbalizes understanding. [x] Demonstrates understanding. [] Needs Review. [] Demonstrates/verbalizes understanding of HEP/Ed previously given. Frequency:  2 days per week for 6 weeks    Patient understands diagnosis/prognosis and consents to treatment, plan and goals: [x] Yes    [] No     Thank you for the opportunity to work with your patient. If you have questions or comments, please contact me at [phone]; fax: [phone]. Electronically signed by: Erich Fair PT         Please sign Physician's Certification and return to: [address] [place] [address]  Dept: [phone]  Dept Fax: [phone] Certification / Comments     Frequency/Duration 2 days per week for 6 weeks. Certification period from 5/18/2021  to 08/18/2021. I have reviewed the Plan of Care established for skilled therapy services and certify that the services are required and that they will be provided while the patient is under my care.     Physician's Comments/Revisions:               Physician's Printed Name:                                           [de-identified] Signature:                                                               Date:

## 2021-05-20 ENCOUNTER — TREATMENT (OUTPATIENT)
Dept: PHYSICAL THERAPY | Age: 61
End: 2021-05-20
Payer: COMMERCIAL

## 2021-05-20 DIAGNOSIS — Z96.652 S/P TOTAL KNEE ARTHROPLASTY, LEFT: Primary | ICD-10-CM

## 2021-05-20 PROCEDURE — 97530 THERAPEUTIC ACTIVITIES: CPT | Performed by: PHYSICAL THERAPIST

## 2021-05-20 PROCEDURE — 97110 THERAPEUTIC EXERCISES: CPT | Performed by: PHYSICAL THERAPIST

## 2021-05-20 NOTE — PROGRESS NOTES
Physical Therapy Daily Treatment Note    Date: 2021  Patient Name: Corrina Mclaughlin  : 1960   MRN: [id number]  DOInjury: --  DOSx: 2021  Referring Provider: Eliceo Alves DO  [address]  [facility] Diagnosis:      Diagnosis Orders   1. S/P total knee arthroplasty, left         Outcome Measure:   Lower Extremity Functional Scale (LEFS) 56% impairment    Access Code: [email or web address]  URL: [email or web address]  Date: 2021  Prepared by: Timothy Yen    Program Notes  Proper Elevation    Exercises  Supine Quad Set on Towel Roll - 2 x daily - 3 sets - 10 reps - 5 sec hold  Supine Heel Slide - 2 x daily - 3 sets - 10 reps  Supine Straight Leg Raises - 2 x daily - 3 sets - 10 reps  Seated Knee Extension AROM - 2 x daily - 3 sets - 10 reps  Heel Prop - 3 x daily - 1 sets - 2-3 min hold  Seated Knee Flexion Stretch - 3 x daily - 3 reps - 30 sec hold    S: Pt stated that she did not have any pain today and that she felt good overall but that her knee still feels tight. O: Mod edema  Time  2334-7278       Visit   Repeat outcome measure at mid point and end.     Pain    Pain 0/10     ROM  -3 ext, 95 flex     Modalities       Ice, compression, elevation   MO   Manual      Stretching knee flexion   MT   Stretching       Patella mobs Next  TE   Prone hangs   TE   Heel props 1 x 2 min  TE   Knee flex stretch-seated 3 x 30 sec  TE   Prone self flexion stretch   TE   Exercise       Nustep  L5 for 10 mins at 8, 7  TE   Quad sets HEP TE   Heel slides HEP TE   SLR HEP TE   Marching    TA   Sidekicks   TA   Squat    TA   Step-ups - FWD  6\" 15 reps x 1 set   TA   Step-ups - LAT 6\" 15 reps x 1 set   TA   Step-ups - BWD    TA   Step up and over reciprocally    TA   [x] TG  [x] Leg Press 2-leg 20 reps x 2 sets at 25  TE   [x] TG  [] Leg Press 1-leg Calf Raises 20 reps x 2 sets at 18  TE   CR    TE   Knee Extension Machine   TE   Marching gait   NR   Side stepping   NR A: Tolerated stairs and TG exercises well without any pain. HEP was reviewed and pt encouraged to get ankle weights for HEP.    P: Continue with rehab plan  Link SULEIMAN Grant    Treatment Charges: Mins Units   Initial Evaluation     Re-Evaluation     Ther Exercise         TE 30 2   Manual Therapy     MT     Ther Activities        TA 15 1   Gait Training          GT     Neuro Re-education NR     Modalities     Non-Billable Service Time     Other     Total Time/Units 45 3

## 2021-05-25 ENCOUNTER — TREATMENT (OUTPATIENT)
Dept: PHYSICAL THERAPY | Age: 61
End: 2021-05-25
Payer: COMMERCIAL

## 2021-05-25 DIAGNOSIS — Z96.652 S/P TOTAL KNEE ARTHROPLASTY, LEFT: Primary | ICD-10-CM

## 2021-05-25 PROCEDURE — 97110 THERAPEUTIC EXERCISES: CPT

## 2021-05-25 PROCEDURE — 97530 THERAPEUTIC ACTIVITIES: CPT

## 2021-05-25 PROCEDURE — 97016 VASOPNEUMATIC DEVICE THERAPY: CPT

## 2021-05-25 NOTE — PROGRESS NOTES
pain after. inst pt need to elevate/ice 2-3x daily.   P: Continue with rehab plan  Chidi Howell PTA    Treatment Charges: Mins Units   Initial Evaluation     Re-Evaluation     Ther Exercise         TE 30 2   Manual Therapy     MT     Ther Activities        TA 15 1   Gait Training          GT     Neuro Re-education NR     Modalities     Non-Billable Service Time     Other 15 1   Total Time/Units 60 4

## 2021-05-28 ENCOUNTER — TREATMENT (OUTPATIENT)
Dept: PHYSICAL THERAPY | Age: 61
End: 2021-05-28
Payer: COMMERCIAL

## 2021-05-28 DIAGNOSIS — Z96.652 S/P TOTAL KNEE ARTHROPLASTY, LEFT: Primary | ICD-10-CM

## 2021-05-28 PROCEDURE — 97016 VASOPNEUMATIC DEVICE THERAPY: CPT

## 2021-05-28 PROCEDURE — 97530 THERAPEUTIC ACTIVITIES: CPT

## 2021-05-28 PROCEDURE — 97110 THERAPEUTIC EXERCISES: CPT

## 2021-05-28 NOTE — PROGRESS NOTES
Physical Therapy Daily Treatment Note    Date: 2021  Patient Name: Eva Villalobos  : 1960   MRN: [id number]  DOInjury: --  DOSx: 2021  Referring Provider: Jeanette Hansen, [address],  [facility] Diagnosis:     1. S/P total knee arthroplasty, left        Outcome Measure:   Lower Extremity Functional Scale (LEFS) 56% impairment    Access Code: [email or web address]  URL: [email or web address]  Date: 2021  Prepared by: Fazal Mcdonald    Program Notes  Proper Elevation    Exercises  Supine Quad Set on Towel Roll - 2 x daily - 3 sets - 10 reps - 5 sec hold  Supine Heel Slide - 2 x daily - 3 sets - 10 reps  Supine Straight Leg Raises - 2 x daily - 3 sets - 10 reps  Seated Knee Extension AROM - 2 x daily - 3 sets - 10 reps  Heel Prop - 3 x daily - 1 sets - 2-3 min hold  Seated Knee Flexion Stretch - 3 x daily - 3 reps - 30 sec hold    S: Pt reports felt the best after last tx since surgery. Still have pain in right knee ashley be getting a shot in it  O:  RTW? Time  930-1030      Visit   Repeat outcome measure at mid point and end.     Pain    Pain 310     ROM  -3 ext,110 flex 21    Modalities       Ice, compression, elevation 15 min  MO   Manual      Stretching knee flexion   MT   Stretching       Patella mobs 20x  TE   Prone hangs   TE   Heel props 1 x 3 min  TE   Knee flex stretch-seated 3 x 1 min  TE   Prone self flexion stretch   TE   Exercise       Nustep  L5 for 10 mins at 8-5  TE   [employer] HEP TE   Heel slides HEP TE   SLR HEP TE   Marching    TA   Sidekicks   TA   Squat    TA   Step-ups - FWD  6\" 15 reps x 1 set   TA   Step-ups - LAT 6\" 15 reps x 1 set   TA   Step-ups - BWD    TA   Step up and over reciprocally    TA   [] TG  [] Leg Press 2-leg   TE   [x] TG  [] Leg Press 1-leg Calf Raises L12   30x  TE   CR    TE   Knee Extension Machine   TE   Marching gait   NR   Side stepping   NR         bike 5 min     A: due to increased swelling used compression machine with decreased swelling and pain after. Pt was able to ride the stat bike and make complete cycle. inst pt need to elevate/ice 2-3x daily. P: Continue with rehab plan. Adding in marching gait.   Mary Jo Ho PTA    Treatment Charges: Mins Units   Initial Evaluation     Re-Evaluation     Ther Exercise         TE 30 2   Manual Therapy     MT     Ther Activities        TA 15 1   Gait Training          GT     Neuro Re-education NR     Modalities     Non-Billable Service Time     Other 15 1   Total Time/Units 60 4

## 2021-06-02 ENCOUNTER — TREATMENT (OUTPATIENT)
Dept: PHYSICAL THERAPY | Age: 61
End: 2021-06-02
Payer: COMMERCIAL

## 2021-06-02 DIAGNOSIS — Z96.652 S/P TOTAL KNEE ARTHROPLASTY, LEFT: Primary | ICD-10-CM

## 2021-06-02 PROCEDURE — 97530 THERAPEUTIC ACTIVITIES: CPT

## 2021-06-02 PROCEDURE — 97110 THERAPEUTIC EXERCISES: CPT

## 2021-06-02 PROCEDURE — 97016 VASOPNEUMATIC DEVICE THERAPY: CPT

## 2021-06-02 NOTE — PROGRESS NOTES
Physical Therapy Daily Treatment Note    Date: 2021  Patient Name: Ellen Kim  : 1960   MRN: [id number]  DOInjury: --  DOSx: 2021  Referring Provider: Karl Higgins, [address] ,  [facility] Diagnosis:     1. S/P total knee arthroplasty, left        Outcome Measure:   Lower Extremity Functional Scale (LEFS) 56% impairment    Access Code: [email or web address]  URL: [email or web address]  Date: 2021  Prepared by: Amish Walker    Program Notes  Proper Elevation    Exercises  Supine Quad Set on Towel Roll - 2 x daily - 3 sets - 10 reps - 5 sec hold  Supine Heel Slide - 2 x daily - 3 sets - 10 reps  Supine Straight Leg Raises - 2 x daily - 3 sets - 10 reps  Seated Knee Extension AROM - 2 x daily - 3 sets - 10 reps  Heel Prop - 3 x daily - 1 sets - 2-3 min hold  Seated Knee Flexion Stretch - 3 x daily - 3 reps - 30 sec hold    S: Pt reports things going well. O:  RTW? Entered clinic without cane  Time  [id number]      Visit  512 Repeat outcome measure at mid point and end. Pain    Pain 3/10     ROM  -3 ext,110 flex 21    Modalities       Ice, compression, elevation 15 min  MO   Manual      Stretching knee flexion   MT   Stretching       Patella mobs 20x  TE   Prone hangs   TE   Heel props 1 x 3 min  TE   Knee flex stretch-seated 3 x 1 min  TE   Prone self flexion stretch   TE   Exercise       Nustep    TE   Quad sets HEP TE   Heel slides HEP TE   SLR HEP TE   Marching    TA   Sidekicks   TA   Squat    TA   Step-ups - FWD  8\" 20 reps x 1 set   TA   Step-ups - LAT 8\" 20 reps x 1 set   TA   Step-ups - BWD    TA   Step up and over reciprocally    TA   [] TG  [] Leg Press 2-leg   TE   [x] TG  [] Leg Press 1-leg Calf Raises L12   30x  TE   CR    TE   Knee Extension Machine   TE   Marching gait   NR   Side stepping   NR         bike 10 min     A: due to increased swelling used compression machine with decreased swelling and pain after.    Pt was able to ride the stat bike and make complete cycle. inst pt need to elevate/ice 2-3x daily. P: Continue with rehab plan. Adding in marching gait.   Chidi Howell PTA    Treatment Charges: Mins Units   Initial Evaluation     Re-Evaluation     Ther Exercise         TE 30 2   Manual Therapy     MT     Ther Activities        TA 15 1   Gait Training          GT     Neuro Re-education NR     Modalities     Non-Billable Service Time     Other 15 1   Total Time/Units 60 4

## 2021-06-03 ENCOUNTER — TREATMENT (OUTPATIENT)
Dept: PHYSICAL THERAPY | Age: 61
End: 2021-06-03
Payer: COMMERCIAL

## 2021-06-03 DIAGNOSIS — Z96.652 S/P TOTAL KNEE ARTHROPLASTY, LEFT: Primary | ICD-10-CM

## 2021-06-03 PROCEDURE — 97016 VASOPNEUMATIC DEVICE THERAPY: CPT

## 2021-06-03 PROCEDURE — 97530 THERAPEUTIC ACTIVITIES: CPT

## 2021-06-03 PROCEDURE — 97110 THERAPEUTIC EXERCISES: CPT

## 2021-06-03 NOTE — PROGRESS NOTES
Physical Therapy Daily Treatment Note    Date: 6/3/2021  Patient Name: Eva Villalobos  : 1960   MRN: [id number]  DOInjury: --  DOSx: 2021  Referring Provider: No referring provider defined for this encounter. Medical Diagnosis:     1. S/P total knee arthroplasty, left        Outcome Measure:   Lower Extremity Functional Scale (LEFS) 56% impairment    Access Code: [email or web address]  URL: [email or web address]  Date: 2021  Prepared by: Fazal Mcdonald    Program Notes  Proper Elevation    Exercises  Supine Quad Set on Towel Roll - 2 x daily - 3 sets - 10 reps - 5 sec hold  Supine Heel Slide - 2 x daily - 3 sets - 10 reps  Supine Straight Leg Raises - 2 x daily - 3 sets - 10 reps  Seated Knee Extension AROM - 2 x daily - 3 sets - 10 reps  Heel Prop - 3 x daily - 1 sets - 2-3 min hold  Seated Knee Flexion Stretch - 3 x daily - 3 reps - 30 sec hold    S: Pt reports knee feels really stiff. O:  RTW? Not using a cane,   Time  7766-9755      Visit   Repeat outcome measure at mid point and end. Pain    Pain 3/10     ROM  -3 ext,115 flex 6/3/21    Modalities       Ice, compression, elevation 15 min  MO   Manual      Stretching knee flexion   MT   Stretching       Patella mobs 20x  TE   Prone hangs   TE   Heel props 1 x 3 min  TE   Knee flex stretch-seated 3 x 1 min  TE   Prone self flexion stretch   TE   Exercise       Nustep    TE   Quad sets HEP TE   Heel slides HEP TE   SLR HEP TE   Marching    TA   Sidekicks   TA   Squat    TA   Step-ups - FWD  8\" 25 reps x 1 set   TA   Step-ups - LAT 8\" 25 reps x 1 set   TA   Step-ups - BWD    TA   Step up and over reciprocally    TA   [] TG  [] Leg Press 2-leg   TE   [x] TG  [] Leg Press 1-leg Calf Raises L12   30x To fatigue  TE   CR    TE   Knee Extension Machine   TE   Marching gait   NR   Side stepping   NR         bike 10 min     A: due to increased swelling used compression machine with decreased swelling and pain after. Increased reps on steps.   inst pt need to elevate/ice 2-3x daily. P: Continue with rehab plan. Adding in marching gait.   Gerald De La Torre, LEONARDA    Treatment Charges: Mins Units   Initial Evaluation     Re-Evaluation     Ther Exercise         TE 30 2   Manual Therapy     MT     Ther Activities        TA 15 1   Gait Training          GT     Neuro Re-education NR     Modalities     Non-Billable Service Time     Other 15 1   Total Time/Units 60 4

## 2021-06-07 ENCOUNTER — TREATMENT (OUTPATIENT)
Dept: PHYSICAL THERAPY | Age: 61
End: 2021-06-07
Payer: COMMERCIAL

## 2021-06-07 DIAGNOSIS — Z96.652 S/P TOTAL KNEE ARTHROPLASTY, LEFT: Primary | ICD-10-CM

## 2021-06-07 PROCEDURE — 97110 THERAPEUTIC EXERCISES: CPT

## 2021-06-07 PROCEDURE — 97016 VASOPNEUMATIC DEVICE THERAPY: CPT

## 2021-06-07 PROCEDURE — 97530 THERAPEUTIC ACTIVITIES: CPT

## 2021-06-07 NOTE — PROGRESS NOTES
with decreased swelling and pain after. Increased reps on steps. inst pt need to elevate/ice 2-3x daily. P: Continue with rehab plan. Adding in marching gait.  If able to to pain in R knee  Kirill Izaguirre PTA    Treatment Charges: Mins Units   Initial Evaluation     Re-Evaluation     Ther Exercise         TE 30 2   Manual Therapy     MT     Ther Activities        TA 15 1   Gait Training          GT     Neuro Re-education NR     Modalities 15 1   Non-Billable Service Time     Other     Total Time/Units 60 4

## 2021-06-09 ENCOUNTER — OFFICE VISIT (OUTPATIENT)
Dept: ORTHOPEDIC SURGERY | Age: 61
End: 2021-06-09
Payer: COMMERCIAL

## 2021-06-09 ENCOUNTER — TREATMENT (OUTPATIENT)
Dept: PHYSICAL THERAPY | Age: 61
End: 2021-06-09
Payer: COMMERCIAL

## 2021-06-09 VITALS — WEIGHT: 273 LBS | HEIGHT: 66 IN | BODY MASS INDEX: 43.87 KG/M2

## 2021-06-09 DIAGNOSIS — Z96.652 S/P TOTAL KNEE ARTHROPLASTY, LEFT: Primary | ICD-10-CM

## 2021-06-09 DIAGNOSIS — M17.12 PRIMARY OSTEOARTHRITIS OF ONE KNEE, LEFT: ICD-10-CM

## 2021-06-09 DIAGNOSIS — M17.11 PRIMARY OSTEOARTHRITIS OF RIGHT KNEE: ICD-10-CM

## 2021-06-09 DIAGNOSIS — M17.12 PRIMARY OSTEOARTHRITIS OF LEFT KNEE: ICD-10-CM

## 2021-06-09 PROCEDURE — 20610 DRAIN/INJ JOINT/BURSA W/O US: CPT | Performed by: NURSE PRACTITIONER

## 2021-06-09 PROCEDURE — 97110 THERAPEUTIC EXERCISES: CPT

## 2021-06-09 PROCEDURE — 97016 VASOPNEUMATIC DEVICE THERAPY: CPT

## 2021-06-09 PROCEDURE — 99213 OFFICE O/P EST LOW 20 MIN: CPT | Performed by: NURSE PRACTITIONER

## 2021-06-09 PROCEDURE — 99024 POSTOP FOLLOW-UP VISIT: CPT | Performed by: NURSE PRACTITIONER

## 2021-06-09 RX ORDER — TRAMADOL HYDROCHLORIDE 50 MG/1
50 TABLET ORAL EVERY 6 HOURS PRN
Qty: 28 TABLET | Refills: 0 | Status: SHIPPED
Start: 2021-06-09 | End: 2021-06-29 | Stop reason: SDUPTHER

## 2021-06-09 RX ORDER — TRIAMCINOLONE ACETONIDE 40 MG/ML
40 INJECTION, SUSPENSION INTRA-ARTICULAR; INTRAMUSCULAR ONCE
Status: COMPLETED | OUTPATIENT
Start: 2021-06-09 | End: 2021-06-09

## 2021-06-09 RX ADMIN — TRIAMCINOLONE ACETONIDE 40 MG: 40 INJECTION, SUSPENSION INTRA-ARTICULAR; INTRAMUSCULAR at 08:33

## 2021-06-09 NOTE — PROGRESS NOTES
Chief Complaint   Patient presents with    Knee Pain     Left TKA follow up. DOS 21. Right knee zilretta was denied. Dre Gabriel returns today for follow-up of her right knee pain. she reports this is worse than when I saw her last.  The patient's pain level is a 8/10. The patient had best results with zilretta however insurance will not approve again. She is 5 weeks post op from left TKA. She is doing well and her pain has improved, she is still in PT.      Past Medical History:   Diagnosis Date    Alopecia areata     Anxiety     Depression     Hyperlipidemia     Nausea & vomiting     Obesity, Class III, BMI 40-49.9 (morbid obesity) (HCC)     Osteoarthritis     PVC (premature ventricular contraction)     Sleep apnea     CPAP    Thyroid disease      Past Surgical History:   Procedure Laterality Date     SECTION      x2    CHOLECYSTECTOMY      COLONOSCOPY  14    bx done Dr Jamie Pryor  2011         KNEE ARTHROSCOPY      NERVE BLOCK Bilateral 3/5/14    lumbar facet #1    NERVE BLOCK  14    paravertebral facet bilateral lumbar #2    NERVE BLOCK N/A 4 9 14    cerv #1    NERVE BLOCK N/A 2014    cervical epidural  #2    NERVE BLOCK N/A 4 23 14    cerv #3    NERVE BLOCK Bilateral 2020    intra-articular facet joint injection     OTHER SURGICAL HISTORY  14    Radiofrequency right lumbar L3-4, L4-5, L5-S1    OTHER SURGICAL HISTORY Left 2014    lumbar radiofrequency    PAIN MANAGEMENT PROCEDURE Bilateral 2020    BILATERAL INTRA-ARTICULAR FACET JOINT INJECTION WITH FLUOROSCOPIC GUIDANCE AT L3-4, L4-5, L5-S1 (CPT U0468539, [id number]) performed by Lars Ulloa DO at [address] [facility] Left 2021    LEFT KNEE TOTAL KNEE ARTHROPLASTY [facility]) performed by Georgia Felipe DO at [address]  14    ashly ,bx sm bowel  Dr Mendel Apple Outpatient Medications:     ondansetron (ZOFRAN) 4 MG tablet, Take 1 tablet by mouth 3 times daily as needed for Nausea or Vomiting, Disp: 15 tablet, Rfl: 0    diclofenac (VOLTAREN) 50 MG EC tablet, TAKE 1 TABLET BY MOUTH 2 TIMES A DAY WITH MEALS, Disp: 60 tablet, Rfl: 3    hydrOXYzine (VISTARIL) 25 MG capsule, Take 1 capsule by mouth 4 times daily as needed for Anxiety, Disp: 360 capsule, Rfl: 0    simvastatin (ZOCOR) 20 MG tablet, Take 1 tablet by mouth nightly, Disp: 90 tablet, Rfl: 1    traZODone (DESYREL) 150 MG tablet, Take 1 tablet by mouth nightly, Disp: 90 tablet, Rfl: 1    diclofenac 1 % CREA, Apply 2 g topically 4 times daily To affected area, Disp: 120 g, Rfl: 3    tiZANidine (ZANAFLEX) 4 MG tablet, Take 1 tablet by mouth every 8 hours as needed (MUSCLE SPASMS), Disp: 90 tablet, Rfl: 1    famotidine (PEPCID) 20 MG tablet, Take 1 tablet by mouth 2 times daily, Disp: 180 tablet, Rfl: 1    atenolol (TENORMIN) 50 MG tablet, Take 1 tablet by mouth nightly, Disp: 90 tablet, Rfl: 2    levothyroxine (SYNTHROID) 50 MCG tablet, Take 1 tablet by mouth Daily (Patient taking differently: Take 50 mcg by mouth nightly ), Disp: 90 tablet, Rfl: 2    sertraline (ZOLOFT) 100 MG tablet, Take 1 tablet by mouth daily Indications: takes 2 tablets at hs Take 2 tablet at hs po (Patient taking differently: Take 100 mg by mouth nightly Indications: takes 2 tablets at hs Take 2 tablet at hs po), Disp: 180 tablet, Rfl: 3    naproxen (NAPROSYN) 500 MG tablet, Take 1 tablet by mouth 2 times daily (with meals), Disp: 180 tablet, Rfl: 1    Cholecalciferol (VITAMIN D-3) 5000 UNITS TABS, Take  by mouth., Disp: , Rfl:   Allergies   Allergen Reactions    Mobic [Meloxicam] Itching     Burning/Itching of there scalp, palms, back and feet.      Morphine Sulfate Itching    Percocet [Oxycodone-Acetaminophen] Itching    Vicodin [Hydrocodone-Acetaminophen] Itching     Social History     Socioeconomic History    Marital status:      Spouse name: Charlie Velarde Number of children: 2    Years of education: 15    Highest education level: Not on file   Occupational History    Occupation: LPN   Tobacco Use    Smoking status: Never Smoker    Smokeless tobacco: Never Used   Vaping Use    Vaping Use: Never used   Substance and Sexual Activity    Alcohol use: Yes     Comment: occasionally    Drug use: No    Sexual activity: Yes     Partners: Male   Other Topics Concern    Not on file   Social History Narrative    Patient lives at home with . She is independent with ADL's, and does require the use of an assistive device. Social Determinants of Health     Financial Resource Strain:     Difficulty of Paying Living Expenses:    Food Insecurity:     Worried About Running Out of Food in the Last Year:     [address] in the Last Year:    Transportation Needs:     Lack of Transportation (Medical):      Lack of Transportation (Non-Medical):    Physical Activity:     Days of Exercise per Week:     Minutes of Exercise per Session:    Stress:     Feeling of Stress :    Social Connections:     Frequency of Communication with Friends and Family:     Frequency of Social Gatherings with Friends and Family:     Attends [employer] Services:     Active Member of Clubs or Organizations:     Attends Club or Organization Meetings:     Marital Status:    Intimate Partner Violence:     Fear of Current or Ex-Partner:     Emotionally Abused:     Physically Abused:     Sexually Abused:      Family History   Problem Relation Age of Onset    Heart Disease Brother         MI with stents    Other Brother         Frontal lobe lesion    Heart Disease Maternal Grandfather     Heart Disease Paternal Grandfather     Cancer Other     Depression Other     Mental Illness Other     Cancer Mother         breast with bone and liver mets    Bipolar Disorder Father     Other Father         Aspiration Pneumonia    Hypertension Sister the spine. full range of motion, no tenderness, palpable spasm or pain on motion. Special tests: Straight Leg Raise negative, Albino testnegative. Hip exam:  Upon inspection, there is no deformity noted. Upon palpation there is not tenderness. ROM: is   full and semetrical.   Strength: Hip Flexors 5/5; Hip Abductors 5/5; Hip Adduction 5/5. Knee exam:  Bilateral knee exam shows;  range of motion of R. Knee is 0 to 120, and L. Knee is 0 to 120. She does have  pain on motion to right knee, effusion is mild, there is tenderness over the  medial region on right knee there are not any masses, there is not ligamentous instability, there is  deformity noted. Knee exam: right positive for moderate crepitations, some mild tenderness laxity is not noted with  stress. R. Knee:  Lachman's negative, Anterior Drawer negative, Posterior Drawer negative  Gloria's positive, Thallasy  positive,   PF grind test positive, Apprehension test negative, Patellar J sign  negative  L. Knee:  Lachman's negative, Anterior Drawer negative, Posterior Drawer negative  Gloria's negative, Thallasy  negative,   PF grind test negative, Apprehension test negative,  Patellar J sign  negative    Xrays:   None today    MRI:   n/a  Radiographic findings reviewed with patient    Impression:  Encounter Diagnoses   Name Primary?  S/P total knee arthroplasty, left Yes    Primary osteoarthritis of right knee        Plan:   Natural history and expected course discussed. Questions answered. Educational materials distributed. Rest, ice, compression, and elevation (RICE) therapy. Reduction in offending activity. Rg hose discontinue  Dc asa  Continue PT  I had a lengthy discussion with the patient regarding their diagnosis. I explained treatment options including surgical vs non surgical treatment. I reviewed in detail the risks and benefits and outlined the procedure in detail with expected outcomes and possible complications.   I also discussed non surgical treatment such as injections (CSI and visco supplementation), physical therapy, topical creams and NSAID's. They have elected for conservative management at this time. I will proceed with a cortisone injection in the rightknee. Verbal and written consent was obtained for the injections. The skin was prepped with alcohol. 1mL of Kenalog 40mg and 9mL of 0.25% Marcaine was  injected to Right knee. The injection was given through the lateral side of the knee. The patient tolerated the injection well.  I will see the patient back in 2 months for left knee  Ok to return to work 7/12/21 no restrictions

## 2021-06-09 NOTE — PROGRESS NOTES
Physical Therapy Daily Treatment Note    Date: 2021  Patient Name: Ellen Kim  : 1960   MRN: [id number]  DOInjury: --  DOSx: 2021  Referring Provider: Karl Higgins, [facility] [place] ,  [facility] Diagnosis:     1. S/P total knee arthroplasty, left        Outcome Measure:   Lower Extremity Functional Scale (LEFS) 56% impairment    Access Code: [email or web address]  URL: [email or web address]  Date: 2021  Prepared by: Amish Walker    Program Notes  Proper Elevation    Exercises  Supine Quad Set on Towel Roll - 2 x daily - 3 sets - 10 reps - 5 sec hold  Supine Heel Slide - 2 x daily - 3 sets - 10 reps  Supine Straight Leg Raises - 2 x daily - 3 sets - 10 reps  Seated Knee Extension AROM - 2 x daily - 3 sets - 10 reps  Heel Prop - 3 x daily - 1 sets - 2-3 min hold  Seated Knee Flexion Stretch - 3 x daily - 3 reps - 30 sec hold    S: Pt reports knee feels pretty good. States L LE still feels weak and endurance is lacking  O:  RTW date 21  Not using a cane,  Still with mod edema  Time  9774-5501      Visit   Repeat outcome measure at mid point and end.     Pain    Pain 3/10     ROM  -3 ext,115 flex 6/3/21    Modalities       Ice, compression, elevation 15 min  MO   Manual      Stretching knee flexion   MT   Stretching       Patella mobs 20x  TE   Prone hangs   TE   Heel props   TE   Knee flex stretch-seated   TE   Prone self flexion stretch   TE   Exercise       Nustep   TE   Quad sets HEP TE   Heel slides HEP TE   SLR HEP TE   LAQ 3# 3 x 15 NEW    Marching    TA   Sidekicks   TA   Squat    TA   Step-ups - FWD  8\" 25 reps x 1 set   TA   Step-ups - LAT 8\" 25 reps x 1 set   TA   Step-ups - BWD  6\" x 25 NEW TA   Step up and over reciprocally    TA   [] TG  [] Leg Press 2-leg  TE   [x] TG  [] Leg Press 1-leg  L12   25x To fatigue  TE   CR    TE   Knee Extension Machine   TE   Marching gait   NR   Side stepping   NR         bike 10 min     A: Able to add additional

## 2021-06-15 ENCOUNTER — TREATMENT (OUTPATIENT)
Dept: PHYSICAL THERAPY | Age: 61
End: 2021-06-15
Payer: COMMERCIAL

## 2021-06-15 DIAGNOSIS — M17.12 PRIMARY OSTEOARTHRITIS OF LEFT KNEE: ICD-10-CM

## 2021-06-15 DIAGNOSIS — Z96.652 S/P TOTAL KNEE ARTHROPLASTY, LEFT: Primary | ICD-10-CM

## 2021-06-15 PROCEDURE — 97110 THERAPEUTIC EXERCISES: CPT

## 2021-06-15 PROCEDURE — 97016 VASOPNEUMATIC DEVICE THERAPY: CPT

## 2021-06-15 NOTE — PROGRESS NOTES
additional exercises as above  P: Continue with rehab plan.   Adding in side stepping If able to to pain in R knee  Shirley Mcmillan, LEONARDA    Treatment Charges: Mins Units   Initial Evaluation     Re-Evaluation     Ther Exercise         TE 40 3   Manual Therapy     MT     Ther Activities        TA     Gait Training          GT     Neuro Re-education NR     Modalities 15 1   Non-Billable Service Time     Other     Total Time/Units 55 4

## 2021-06-17 ENCOUNTER — TREATMENT (OUTPATIENT)
Dept: PHYSICAL THERAPY | Age: 61
End: 2021-06-17
Payer: COMMERCIAL

## 2021-06-17 DIAGNOSIS — Z96.652 S/P TOTAL KNEE ARTHROPLASTY, LEFT: Primary | ICD-10-CM

## 2021-06-17 PROCEDURE — 97110 THERAPEUTIC EXERCISES: CPT

## 2021-06-17 PROCEDURE — 97016 VASOPNEUMATIC DEVICE THERAPY: CPT

## 2021-06-17 NOTE — PROGRESS NOTES
Physical Therapy Daily Treatment Note    Date: 2021  Patient Name: Hannah Isaacs  : 1960   MRN: [id number]  DOInjury: --  DOSx: 2021  Referring Provider: Angel Sommer, [address] [place] ,  [facility] Diagnosis:     1. S/P total knee arthroplasty, left        Outcome Measure:   Lower Extremity Functional Scale (LEFS) 56% impairment    Access Code: [email or web address]  URL: [email or web address]  Date: 2021  Prepared by: Paul Adams    Program Notes  Proper Elevation    Exercises  Supine Quad Set on Towel Roll - 2 x daily - 3 sets - 10 reps - 5 sec hold  Supine Heel Slide - 2 x daily - 3 sets - 10 reps  Supine Straight Leg Raises - 2 x daily - 3 sets - 10 reps  Seated Knee Extension AROM - 2 x daily - 3 sets - 10 reps  Heel Prop - 3 x daily - 1 sets - 2-3 min hold  Seated Knee Flexion Stretch - 3 x daily - 3 reps - 30 sec hold    S: Pt reports knee feels pretty good. States L LE still feels weak and endurance is lacking  O:  RTW date 21  Not using a cane,  Still with mod edema  Time  0909-5173      Visit   Repeat outcome measure at mid point and end.     Pain    Pain 3/10     ROM  -3 ext,115 flex 6/3/21    Modalities       Ice, compression, elevation 15 min  MO   Manual      Stretching knee flexion   MT   Stretching       Patella mobs 20x  TE   Prone hangs   TE   Heel props   TE   Knee flex stretch-seated   TE   Prone self flexion stretch   TE   Exercise       Nustep   TE   Quad sets HEP TE   Heel slides HEP TE   SLR HEP TE   LAQ 3# 3 x 15     Marching    TA   Sidekicks   TA   Squat    TA   Step-ups - FWD  8\" 25 reps x 1 set   TA   Step-ups - LAT 8\" 25 reps x 1 set   TA   Step-ups - BWD  6\" x 25  TA   Step up and over reciprocally    TA   [] TG  [] Leg Press 2-leg  TE   [x] TG  [] Leg Press 1-leg  L12   25x To fatigue  TE   CR    TE   Knee Extension Machine   TE   Marching gait 2 x 35 ft  NR   Side stepping   NR         bike 10 min     A: Able to add additional exercises as above  P: Continue with rehab plan.   Adding in side stepping If able to to pain in R knee  Felicitas Fierro, LEONARDA    Treatment Charges: Mins Units   Initial Evaluation     Re-Evaluation     Ther Exercise         TE 40 3   Manual Therapy     MT     Ther Activities        TA     Gait Training          GT     Neuro Re-education NR     Modalities 15 1   Non-Billable Service Time     Other     Total Time/Units 55 4

## 2021-06-23 ENCOUNTER — TREATMENT (OUTPATIENT)
Dept: PHYSICAL THERAPY | Age: 61
End: 2021-06-23
Payer: COMMERCIAL

## 2021-06-23 DIAGNOSIS — M17.12 PRIMARY OSTEOARTHRITIS OF LEFT KNEE: ICD-10-CM

## 2021-06-23 DIAGNOSIS — Z96.652 S/P TOTAL KNEE ARTHROPLASTY, LEFT: Primary | ICD-10-CM

## 2021-06-23 PROCEDURE — 97016 VASOPNEUMATIC DEVICE THERAPY: CPT

## 2021-06-23 PROCEDURE — 97110 THERAPEUTIC EXERCISES: CPT

## 2021-06-23 NOTE — PROGRESS NOTES
Physical Therapy Daily Treatment Note    Date: 2021  Patient Name: Javi Avendano  : 1960   MRN: [id number]  DOInjury: --  DOSx: 2021  Referring Provider: Dave Mullen, [address] [place] ,  [facility] Diagnosis:     1. S/P total knee arthroplasty, left        Outcome Measure:   Lower Extremity Functional Scale (LEFS) 56% impairment. LEFS 32% 21    Access Code: [facility]  URL: [email or web address]  Date: 2021  Prepared by: Noble Canales    Program Notes  Proper Elevation    Exercises  Supine Quad Set on Towel Roll - 2 x daily - 3 sets - 10 reps - 5 sec hold  Supine Heel Slide - 2 x daily - 3 sets - 10 reps  Supine Straight Leg Raises - 2 x daily - 3 sets - 10 reps  Seated Knee Extension AROM - 2 x daily - 3 sets - 10 reps  Heel Prop - 3 x daily - 1 sets - 2-3 min hold  Seated Knee Flexion Stretch - 3 x daily - 3 reps - 30 sec hold    S: Pt reports knee feels pretty good but increased swelling due to doing house work for the last few days. States R LE is causing more pain and issues. O:  RTW date 21  ,  Still with mod edema  Time  [phone]      Visit   Repeat outcome measure at mid point and end.     Pain    Pain 1/10     ROM  -3 ext,118 flex 21    Modalities       Ice, compression, elevation 15 min  MO   Manual      Stretching knee flexion   MT   Stretching       Patella mobs 20x  TE   Prone hangs   TE   Heel props   TE   Knee flex stretch-seated   TE   Prone self flexion stretch   TE   Exercise       Nustep   TE   Quad sets HEP TE   Heel slides HEP TE   SLR HEP TE   LAQ 3# 3 x 15     Marching    TA   Sidekicks   TA   Squat    TA   Step-ups - FWD  8\" 25 reps x 1 set   TA   Step-ups - LAT 8\" 25 reps x 1 set   TA   Step-ups - BWD  6\" x 25  TA   Step up and over reciprocally    TA   [] TG  [] Leg Press 2-leg  TE   [x] TG  [] Leg Press 1-leg  L12   25x To fatigue  TE   CR    TE   Knee Extension Machine   TE   Marching gait 2 x 35 ft  NR Side stepping   NR         bike 10 min     A: due to right knee pain certain activities are limited.  Unable to do side stepping due to R knee pain  P: discharge on next visit  Radha Morse PTA    Treatment Charges: Mins Units   Initial Evaluation     Re-Evaluation     Ther Exercise         TE 40 3   Manual Therapy     MT     Ther Activities        TA     Gait Training          GT     Neuro Re-education NR     Modalities 15 1   Non-Billable Service Time     Other     Total Time/Units 55 4

## 2021-06-25 ENCOUNTER — TELEPHONE (OUTPATIENT)
Dept: PHYSICAL MEDICINE AND REHAB | Age: 61
End: 2021-06-25

## 2021-06-25 ENCOUNTER — TREATMENT (OUTPATIENT)
Dept: PHYSICAL THERAPY | Age: 61
End: 2021-06-25
Payer: COMMERCIAL

## 2021-06-25 DIAGNOSIS — M17.12 PRIMARY OSTEOARTHRITIS OF LEFT KNEE: ICD-10-CM

## 2021-06-25 DIAGNOSIS — Z96.652 S/P TOTAL KNEE ARTHROPLASTY, LEFT: Primary | ICD-10-CM

## 2021-06-25 PROCEDURE — 97530 THERAPEUTIC ACTIVITIES: CPT

## 2021-06-25 PROCEDURE — 97110 THERAPEUTIC EXERCISES: CPT

## 2021-06-25 PROCEDURE — 97016 VASOPNEUMATIC DEVICE THERAPY: CPT

## 2021-06-25 NOTE — TELEPHONE ENCOUNTER
Patient called stating that her carpal tunnel has been acting up again. She states that it is bilateral but that the left side is worse. She stated that last time she was here (2-1-21 for back pain) she was told that she would most likely need another EMG to show severity. Last BUE EMG was 12-16-19. Last OV for carpal tunnel 1-29-20. Last injection (left side) 6-10-20. Please advise.

## 2021-06-29 ENCOUNTER — OFFICE VISIT (OUTPATIENT)
Dept: PHYSICAL MEDICINE AND REHAB | Age: 61
End: 2021-06-29
Payer: COMMERCIAL

## 2021-06-29 VITALS
HEIGHT: 66 IN | DIASTOLIC BLOOD PRESSURE: 73 MMHG | SYSTOLIC BLOOD PRESSURE: 123 MMHG | BODY MASS INDEX: 42.75 KG/M2 | HEART RATE: 62 BPM | WEIGHT: 266 LBS

## 2021-06-29 DIAGNOSIS — M17.12 PRIMARY OSTEOARTHRITIS OF ONE KNEE, LEFT: ICD-10-CM

## 2021-06-29 DIAGNOSIS — M47.816 FACET ARTHROPATHY, LUMBAR: ICD-10-CM

## 2021-06-29 DIAGNOSIS — M43.16 SPONDYLOLISTHESIS AT L4-L5 LEVEL: ICD-10-CM

## 2021-06-29 DIAGNOSIS — G56.03 BILATERAL CARPAL TUNNEL SYNDROME: Primary | ICD-10-CM

## 2021-06-29 DIAGNOSIS — M47.816 LUMBAR SPONDYLOSIS: ICD-10-CM

## 2021-06-29 PROCEDURE — 20526 THER INJECTION CARP TUNNEL: CPT | Performed by: PHYSICAL MEDICINE & REHABILITATION

## 2021-06-29 PROCEDURE — 76942 ECHO GUIDE FOR BIOPSY: CPT | Performed by: PHYSICAL MEDICINE & REHABILITATION

## 2021-06-29 PROCEDURE — 99213 OFFICE O/P EST LOW 20 MIN: CPT | Performed by: PHYSICAL MEDICINE & REHABILITATION

## 2021-06-29 RX ORDER — TRIAMCINOLONE ACETONIDE 40 MG/ML
40 INJECTION, SUSPENSION INTRA-ARTICULAR; INTRAMUSCULAR ONCE
Status: COMPLETED | OUTPATIENT
Start: 2021-06-29 | End: 2021-06-30

## 2021-06-29 RX ORDER — TRAMADOL HYDROCHLORIDE 50 MG/1
50 TABLET ORAL EVERY 6 HOURS PRN
Qty: 28 TABLET | Refills: 0 | Status: SHIPPED
Start: 2021-06-29 | End: 2022-03-03 | Stop reason: SDUPTHER

## 2021-06-29 RX ORDER — LIDOCAINE HYDROCHLORIDE 10 MG/ML
2 INJECTION, SOLUTION INFILTRATION; PERINEURAL ONCE
Status: COMPLETED | OUTPATIENT
Start: 2021-06-29 | End: 2021-06-30

## 2021-06-29 NOTE — PROGRESS NOTES
Clifford Kaminski D.O. [facility]   [address]  Phone: [phone]  Fax: [phone]        21    Chief Complaint   Patient presents with    Follow-up     Patient here for a folow up on bilateral CTS. HPI:  Dejuan Moura is a 62y.o. year old woman seen today in follow up regarding carpal tunnel syndrome. Interval history: Since the last visit the patient has not had a carpal tunnel injection since 2020. She is using night splints. Last EMG was in 2019 showed moderate CTS. Today, the pain is rated Pain Score:   0 - No pain where 0 is no pain and 10 is pain as bad as it can be. The pain is located in the left hand,  does not radiate, and is described as numb. This pain occurs intermittently. The symptoms have been better since onset. Symptoms are exacerbated by standing and walking use of hand. Factors which relieve the pain include iontophoresis, light therapy. Other associated symptoms include stiffness. Otherwise, the pain assessment has not changed since the last visit.      Past Medical History:   Diagnosis Date    Alopecia areata     Anxiety     Depression     Hyperlipidemia     Nausea & vomiting     Obesity, Class III, BMI 40-49.9 (morbid obesity) (HCC)     Osteoarthritis     PVC (premature ventricular contraction)     Sleep apnea     CPAP    Thyroid disease      Past Surgical History:   Procedure Laterality Date     SECTION      x2    CHOLECYSTECTOMY      COLONOSCOPY  14    bx done Dr Bindu Hong  2011         KNEE ARTHROSCOPY      NERVE BLOCK Bilateral 3/5/14    lumbar facet #1    NERVE BLOCK  14    paravertebral facet bilateral lumbar #2    NERVE BLOCK N/A 4 9 14    cerv #1    NERVE BLOCK N/A 2014    cervical epidural  #2    NERVE BLOCK N/A 4 23 14    cerv #3    NERVE BLOCK Bilateral 2020    intra-articular facet joint injection     OTHER SURGICAL HISTORY  07/16/14    Radiofrequency right lumbar L3-4, L4-5, L5-S1    OTHER SURGICAL HISTORY Left 08 18 2014    lumbar radiofrequency    PAIN MANAGEMENT PROCEDURE Bilateral 12/21/2020    BILATERAL INTRA-ARTICULAR FACET JOINT INJECTION WITH FLUOROSCOPIC GUIDANCE AT L3-4, L4-5, L5-S1 (CPT 47816, 88864) [facility] Jimena Sweet DO [facility] [address] [facility] 4/28/2021    LEFT KNEE TOTAL KNEE ARTHROPLASTY [facility]) performed by Niesha Rodrigues DO at [facility] 26  12/16/14    ashly ,bx sm bowel  Dr Lemuel Enriquez History     Tobacco Use    Smoking status: Never Smoker    Smokeless tobacco: Never Used   Vaping Use    Vaping Use: Never used   Substance Use Topics    Alcohol use: Yes     Comment: occasionally    Drug use: No     Family History   Problem Relation Age of Onset    Heart Disease Brother         MI with stents    Other Brother         Frontal lobe lesion    Heart Disease Maternal Grandfather     Heart Disease Paternal Grandfather     Cancer Other     Depression Other     Mental Illness Other     Cancer Mother         breast with bone and liver mets    Bipolar Disorder Father     Other Father         Aspiration Pneumonia    Hypertension Sister     Depression Sister     Anxiety Disorder Sister        Current Outpatient Medications   Medication Sig Dispense Refill    traMADol (ULTRAM) 50 MG tablet Take 1 tablet by mouth every 6 hours as needed for Pain for up to 7 days. Intended supply: 3 days.  Take lowest dose possible to manage pain 28 tablet 0    ondansetron (ZOFRAN) 4 MG tablet Take 1 tablet by mouth 3 times daily as needed for Nausea or Vomiting 15 tablet 0    diclofenac (VOLTAREN) 50 MG EC tablet TAKE 1 TABLET BY MOUTH 2 TIMES A DAY WITH MEALS 60 tablet 3    simvastatin (ZOCOR) 20 MG tablet Take 1 tablet by mouth nightly 90 tablet 1    traZODone (DESYREL) 150 MG tablet Take 1 lower extremities. Impression:   1. Bilateral carpal tunnel syndrome    2. Primary osteoarthritis of one knee, left    3. Spondylolisthesis at L4-L5 level    4. Facet arthropathy, lumbar    5. Lumbar spondylosis        Plan:  Controlled Substance Monitoring:    Acute and Chronic Pain Monitoring:   RX Monitoring 6/29/2021   Attestation -   Periodic Controlled Substance Monitoring No signs of potential drug abuse or diversion identified. Chronic Pain > 50 MEDD -         Continue current medications. Orders Placed This Encounter   Medications    traMADol (ULTRAM) 50 MG tablet     Sig: Take 1 tablet by mouth every 6 hours as needed for Pain for up to 7 days. Intended supply: 3 days. Take lowest dose possible to manage pain     Dispense:  28 tablet     Refill:  0     Reduce doses taken as pain becomes manageable    triamcinolone acetonide (KENALOG-40) injection 40 mg    lidocaine 1 % injection 2 mL     Orders Placed This Encounter   Procedures    US GUIDED NEEDLE PLACEMENT     Standing Status:   Future     Standing Expiration Date:   6/29/2022    EMG     Order Specific Question:   Which body part? Answer:   bilateral upper extremities regarding CTS    SPLINT VELCRO WRIST    SD INJECT CARPAL TUNNEL       The patient was educated about the diagnosis, prognosis, indications, risks and benefits of treatment. An opportunity to ask questions was given to the patient and questions were answered. The patient agreed to proceed with the recommended treatment as described above. Follow up prn  Thank you for allowing me to participate in the care of your patient. Arnulfo Vazquez D.O., P.T. Board Certified Physical Medicine and Rehabilitation  Board Certified [facility]. [address] [facility]  [address]  [address] [facility]  Phone: [phone]  Fax: [phone]    7/1/2021    Chief Complaint   Patient presents with   Nash Follow-up     Patient here for a folow up on bilateral CTS. Last injection: 6/10/20  Taking anticoagulants/antiplatelets: No  Diabetic: No  Febrile/active infection: No    After explaining the indications, risks, benefits and alternatives of a Left carpal tunnel injection, the patient agreed to proceed. Permit wwas signed and scanned into the media. The patient was placed in the seated position. The skin on the volar wrist was prepared with chloraprep. Ethyl Chloride vapocoolant spray was used for local anesthesia. Using an aseptic, no touch technique, a 25 gauge, 5/8\" needle with 2 cc of Xylocaine 1% and 1 cc of Kenalog 40 mg/cc was directed into the carpal tunnel using ultrasound guidance  After negative aspiration, the medication was injected. Adequate hemostasis was obtained and a bandage applied to the injection site. The patient tolerated the procedure well and was educated in post injection care. There was post injection reduction in pain. The images are uploaded separately in the EMR. Ady Rivera D.O., P.T.   Board Certified Physical Medicine and Rehabilitation  Board Certified Electrodiagnostic Medicine    Administrations This Visit     lidocaine 1 % injection 2 mL     Admin Date  06/30/2021  16:30 Action  Given Dose  2 mL Route  Other Site   Administered By  Nora Lyle RN    Ordering Provider: Symone Dasilva DO    NDC: 1827-4326-66    Lot#: [id number]    : SLIME    Patient Supplied?: No          triamcinolone acetonide (KENALOG-40) injection 40 mg     Admin Date  06/30/2021  16:31 Action  Given Dose  40 mg Route  Intra-articular Site   Administered By  Nora Lyle RN    Ordering Provider: Symone Dasilva DO    NDC: 5258-4318-82    Lot#: [id number]    : [employer] (PRIMARY CARE)    Patient Supplied?: No

## 2021-06-30 RX ADMIN — TRIAMCINOLONE ACETONIDE 40 MG: 40 INJECTION, SUSPENSION INTRA-ARTICULAR; INTRAMUSCULAR at 16:31

## 2021-06-30 RX ADMIN — LIDOCAINE HYDROCHLORIDE 2 ML: 10 INJECTION, SOLUTION INFILTRATION; PERINEURAL at 16:30

## 2021-07-07 ENCOUNTER — TELEPHONE (OUTPATIENT)
Dept: PHYSICAL MEDICINE AND REHAB | Age: 61
End: 2021-07-07

## 2021-07-07 ENCOUNTER — OFFICE VISIT (OUTPATIENT)
Dept: PHYSICAL MEDICINE AND REHAB | Age: 61
End: 2021-07-07
Payer: COMMERCIAL

## 2021-07-07 VITALS — HEIGHT: 66 IN | WEIGHT: 266 LBS | BODY MASS INDEX: 42.75 KG/M2

## 2021-07-07 DIAGNOSIS — G56.03 CARPAL TUNNEL SYNDROME, BILATERAL: ICD-10-CM

## 2021-07-07 DIAGNOSIS — G56.03 CARPAL TUNNEL SYNDROME, BILATERAL: Primary | ICD-10-CM

## 2021-07-07 PROCEDURE — 95886 MUSC TEST DONE W/N TEST COMP: CPT | Performed by: PHYSICAL MEDICINE & REHABILITATION

## 2021-07-07 PROCEDURE — 20526 THER INJECTION CARP TUNNEL: CPT | Performed by: PHYSICAL MEDICINE & REHABILITATION

## 2021-07-07 PROCEDURE — 95910 NRV CNDJ TEST 7-8 STUDIES: CPT | Performed by: PHYSICAL MEDICINE & REHABILITATION

## 2021-07-07 RX ORDER — LIDOCAINE HYDROCHLORIDE 10 MG/ML
2 INJECTION, SOLUTION INFILTRATION; PERINEURAL ONCE
Status: COMPLETED | OUTPATIENT
Start: 2021-07-07 | End: 2021-07-07

## 2021-07-07 RX ORDER — TRIAMCINOLONE ACETONIDE 40 MG/ML
40 INJECTION, SUSPENSION INTRA-ARTICULAR; INTRAMUSCULAR ONCE
Status: COMPLETED | OUTPATIENT
Start: 2021-07-07 | End: 2021-07-07

## 2021-07-07 RX ADMIN — TRIAMCINOLONE ACETONIDE 40 MG: 40 INJECTION, SUSPENSION INTRA-ARTICULAR; INTRAMUSCULAR at 12:04

## 2021-07-07 RX ADMIN — LIDOCAINE HYDROCHLORIDE 2 ML: 10 INJECTION, SOLUTION INFILTRATION; PERINEURAL at 12:04

## 2021-07-07 NOTE — PROGRESS NOTES
Vince Walsh D.O. [facility]  [address]  Phone: [phone]  Fax: [phone]    7/7/2021    Chief Complaint   Patient presents with    Extremity Pain     none    Numbness     left hand is entire hand numbness. right hand is the first three fingres. left hand is worse feeling wise. 2+ years of symp.  Extremity Weakness     left hand dropping items. Last injection: n/a  Taking anticoagulants/antiplatelets: No  Diabetic: No  Febrile/active infection: No    After explaining the indications, risks, benefits and alternatives of a Right carpal tunnel injection, the patient agreed to proceed. A permit was signed and scanned into the media. The patient was placed in the seated position. The skin on the volar wrist was prepared with betadine and alcohol. Ethyl Chloride vapocoolant spray was used for local anesthesia. Using an aseptic, no touch technique, a 25 gauge, 5/8\" needle with 2 cc of Xylocaine 1% and 1 cc of Kenalog 40 mg/cc was directed into the carpal tunnel. After negative aspiration, the medication was injected. The patient tolerated the procedure well and was educated in post injection care. Adequate hemostasis was obtained and a bandage was applied. The patient was monitored clinically and left the office without incident. There was post injection reduction in pain. Vince Walsh D.O., P.T.   Board Certified Physical Medicine and Rehabilitation  Board Certified Electrodiagnostic Medicine    Administrations This Visit     lidocaine 1 % injection 2 mL     Admin Date  07/07/2021  12:04 Action  Given Dose  2 mL Route  Other Site   Administered By  Erica Brady MA    Ordering Provider: Sharron Bello DO    NDC: 1018-6038-46    Lot#: [id number]    : [id number] [facility]    Patient Supplied?: No          triamcinolone acetonide (KENALOG-40) injection 40 mg     Admin Date  07/07/2021  12:04 Action  Given Dose  40 mg Route  Intra-articular Site   Administered By  Slade Boucher MA    Ordering Provider: Luis Kitchen DO    NDC: 5194-0291-41    Lot#: [id number]    : [employer] (PRIMARY CARE)    Patient Supplied?: No

## 2021-07-07 NOTE — PROGRESS NOTES
[address] [facility]  Electrodiagnostic Laboratory  *Accredited by the [address] [place] [address] with exemplary status  [address] [facility] [address]  Phone: [phone]  Fax: [phone]    Referring Provider: Ashlyn Hardwick DO  Primary Care Physician: Christin Lopez DO  Patient Name: Kassi Ellis  Patient YOB: 1960  Gender: female  BMI: Body mass index is 42.93 kg/m². Height 5' 6\" (1.676 m), weight 266 lb (120.7 kg), last menstrual period 10/13/2016, not currently breastfeeding. 7/7/2021    Description of clinical problem:   Chief Complaint   Patient presents with    Extremity Pain     none    Numbness     left hand is entire hand numbness. right hand is the first three fingres. left hand is worse feeling wise. 2+ years of symp.  Extremity Weakness     left hand dropping items. Sensory NCS      Nerve / Sites Rec. Site Peak Lat PP Amp Segments Distance Velocity Temp. ms µV  cm m/s °C   L Median - Digit II (Antidromic)      Palm Dig II 2.03 33.9 Palm - Dig II 7 56 32      Wrist Dig II 4.11* 25.6 Wrist - Dig II 14 41 32   R Median - Digit II (Antidromic)      Palm Dig II 1.88 22.3 Palm - Dig II 7 64 32      Wrist Dig II 4.27* 20.5 Wrist - Dig II 14 41 32   L Ulnar - Digit V (Antidromic)      Wrist Dig V 3.59 22.3 Wrist - Dig V 14 52 32   L Radial - Anatomical snuff box (Forearm)      Forearm Wrist 2.40 14.5 Forearm - Wrist 10 53 32       Motor NCS      Nerve / Sites Muscle Onset Amplitude Segments Distance Velocity Temp.     ms mV  cm m/s °C   L Median - APB      Palm APB 1.93 11.5 Palm - APB   32      Wrist APB 4.27 10.9 Wrist - Palm 8 34* 32      Elbow APB 8.49 8.5 Elbow - Wrist 19 45* 32   R Median - APB      Palm APB 2.08 11.9 Palm - APB   32      Wrist APB 4.17 10.9 Wrist - Palm 8 38* 32      Elbow APB 8.54 9.1 Elbow - Wrist 20 46* 32   L Ulnar - ADM      Wrist ADM 2.81 8.1 Wrist - ADM 8  32      B. Elbow ADM 6.25 4.4 B. Elbow - Wrist 19 55 32      A. Elbow ADM 8. 18 4.5 A. Elbow - B. Elbow 10 52 32       F Wave      Nerve Fmin % F    ms %   L Median - APB 28.33 100   L Ulnar - ADM 27.66 80   R Median - APB 28.28 100       EMG      EMG Summary Table     Spontaneous MUAP Recruitment   Muscle Nerve Roots IA Fib PSW Fasc Amp Dur. PPP Pattern   L. Biceps brachii Musculocutaneous C5-C6 N None None None N N N N   L. Triceps brachii Radial C6-C8 N None None None N N N N   L. Pronator teres Median C6-C7 N None None None N N N N   L. First dorsal interosseous Ulnar C8-T1 N None None None N N N N   L. Abductor pollicis brevis Median S7-I3 N None None None N N N N   R. Biceps brachii Musculocutaneous C5-C6 N None None None N N N N   R. Triceps brachii Radial C6-C8 N None None None N N N N   R. Pronator teres Median C6-C7 N None None None N N N N   R. First dorsal interosseous Ulnar C8-T1 N None None None N N N N   R. Abductor pollicis brevis Median K8-W3 N None None None N N N N          Study Limitations:  none    Summary of Findings:   Nerve conduction studies:   · The following nerve conduction studies were abnormal:   · Bilateral median sensory latency is prolonged. · Bilateral median motor conduction velocity is slow with focal slowing across the wrist.   · All other nerve conduction studies listed in the table above were normal in latency, amplitude and conduction velocity. Needle EMG:   · Needle EMG was performed using a concentric needle.  All muscles tested, as listed in the table above demonstrated normal amplitude, duration, phases and recruitment and no active denervation signs were seen. Diagnostic Interpretation: This study was abnormal.     Electrodiagnosis: There is electrodiagnostic evidence of a median mononeuropathy.    · Location: bilateral at the wrist.   · Nature: [  ] Axonal   Jannie.Royal  ] Demyelinating  [  ] Mixed axonal and demyelinating     [  ] Sensory [  ] Motor               Troy.Royal  ] Mixed sensorimotor     [  ] with active denervation       Jannie.Royal ] without active denervation  · Duration: Acute  · Severity: moderate  · Prognosis: Good    Previous Study: Compared to prior study by me in 2019, overall severity is unchanged. Follow up EMG is recommended if no surgical intervention and symptoms persist in one year    Technologist: Sachin Figueroa  Physician:    Walter Mejia D.O., P.T. Board Certified Physical Medicine and Rehabilitation  Board Certified Electrodiagnostic Medicine      Nerve conduction studies and electromyography were performed according to our laboratory policies and procedures which can be provided upon request. All abnormal values are identified in the table.  Laboratory normal values can also be provided upon request.       Cc: DO Mumtaz Hinds DO

## 2021-07-07 NOTE — PATIENT INSTRUCTIONS
Electrodiagnotic Laboratory  Accredited by the [facility] with Exemplary status  [employer], D.O Ulis Burkitt, D.O. [facility]  [address] [facility] [address]  Phone: [phone]  Fax: [phone]        Today you had an electrodiagnostic exam which included nerve conduction studies (NCS) and electromyography (EMG). This test evaluated the electrical activity of your nerves and muscles to help determine if you have a nerve or muscle disease. This test can help determine the location and type of a nerve or muscle problem. This will help your referring doctor diagnose your condition and determine the appropriate next step in your treatment plan. After your test:    1. There are no long lasting side effects of the test.     2. You may resume your normal activities without restrictions. 3.  Resume any medications that were stopped for the test.     4  If you have sore areas or bruising in your muscles where the needle was placed, apply a cold pack to the sore area for 15-20 minutes three to four times a day as needed for pain. The soreness should go away in about 1-2 days. 5. Your results were provided  Briefly at the end of your test and the final detailed report will be provided to your referring physician, and/or primary care physician and any other parties you requested within 1-2 days of the examination. You may wish to contact your referring provider after a few days to determine what they would like you to do next. 6.  Please call [phone] with any questions or concerns and if you develop increased body temperature/fever, swelling, tenderness, increased pain and/or drainage from the sites where the needle was placed. Thank you for choosing us for your health care needs.

## 2021-07-07 NOTE — TELEPHONE ENCOUNTER
Called patient for follow up post left carpal tunnel injection received 80 % effectiveness from injection, no side effects

## 2021-07-14 ENCOUNTER — TELEPHONE (OUTPATIENT)
Dept: PHYSICAL MEDICINE AND REHAB | Age: 61
End: 2021-07-14

## 2021-08-09 DIAGNOSIS — M17.11 PRIMARY OSTEOARTHRITIS OF RIGHT KNEE: Primary | ICD-10-CM

## 2021-08-09 DIAGNOSIS — Z96.652 S/P TOTAL KNEE ARTHROPLASTY, LEFT: ICD-10-CM

## 2021-08-10 ENCOUNTER — OFFICE VISIT (OUTPATIENT)
Dept: ORTHOPEDIC SURGERY | Age: 61
End: 2021-08-10
Payer: COMMERCIAL

## 2021-08-10 VITALS — WEIGHT: 266 LBS | TEMPERATURE: 98 F | BODY MASS INDEX: 42.75 KG/M2 | HEIGHT: 66 IN

## 2021-08-10 DIAGNOSIS — Z96.652 S/P TOTAL KNEE ARTHROPLASTY, LEFT: Primary | ICD-10-CM

## 2021-08-10 PROCEDURE — 99213 OFFICE O/P EST LOW 20 MIN: CPT | Performed by: ORTHOPAEDIC SURGERY

## 2021-08-10 NOTE — PROGRESS NOTES
Chief Complaint   Patient presents with    Knee Pain     Left TKA DOS 2021, states of swelling. Dallas Zapata returns today for follow-up of her left knee pain. she reports this is better than when I saw her last.  The patient's pain level is a 0/10. The previous treatment was successful.     Past Medical History:   Diagnosis Date    Alopecia areata     Anxiety     Depression     Hyperlipidemia     Nausea & vomiting     Obesity, Class III, BMI 40-49.9 (morbid obesity) (HCC)     Osteoarthritis     PVC (premature ventricular contraction)     Sleep apnea     CPAP    Thyroid disease      Past Surgical History:   Procedure Laterality Date     SECTION      x2    CHOLECYSTECTOMY      COLONOSCOPY  14    bx done Dr Chelo Rojas  2011         KNEE ARTHROSCOPY      NERVE BLOCK Bilateral 3/5/14    lumbar facet #1    NERVE BLOCK  14    paravertebral facet bilateral lumbar #2    NERVE BLOCK N/A 4 9 14    cerv #1    NERVE BLOCK N/A 2014    cervical epidural  #2    NERVE BLOCK N/A 14    cerv #3    NERVE BLOCK Bilateral 2020    intra-articular facet joint injection     OTHER SURGICAL HISTORY  14    Radiofrequency right lumbar L3-4, L4-5, L5-S1    OTHER SURGICAL HISTORY Left 2014    lumbar radiofrequency    PAIN MANAGEMENT PROCEDURE Bilateral 2020    BILATERAL INTRA-ARTICULAR FACET JOINT INJECTION WITH FLUOROSCOPIC GUIDANCE AT L3-4, L4-5, L5-S1 (CPT [address] [facility], [id number]) performed by Michael Chavira DO at [address] [facility] Left 2021    LEFT KNEE TOTAL KNEE ARTHROPLASTY [facility] & NEPHEW) performed by Jessica Lewis DO at [address] [facility]  14    ashly ,bx sm bowel  Dr aPtricia Walter       Current Outpatient Medications:     diclofenac (VOLTAREN) 50 MG EC tablet, TAKE 1 TABLET BY MOUTH 2 TIMES A DAY WITH MEALS, Disp: 60 tablet, Rfl: 3    ondansetron (ZOFRAN) 4 MG tablet, Take 1 tablet by mouth 3 times daily as needed for Nausea or Vomiting, Disp: 15 tablet, Rfl: 0    simvastatin (ZOCOR) 20 MG tablet, Take 1 tablet by mouth nightly, Disp: 90 tablet, Rfl: 1    traZODone (DESYREL) 150 MG tablet, Take 1 tablet by mouth nightly, Disp: 90 tablet, Rfl: 1    diclofenac 1 % CREA, Apply 2 g topically 4 times daily To affected area, Disp: 120 g, Rfl: 3    tiZANidine (ZANAFLEX) 4 MG tablet, Take 1 tablet by mouth every 8 hours as needed (MUSCLE SPASMS), Disp: 90 tablet, Rfl: 1    famotidine (PEPCID) 20 MG tablet, Take 1 tablet by mouth 2 times daily, Disp: 180 tablet, Rfl: 1    atenolol (TENORMIN) 50 MG tablet, Take 1 tablet by mouth nightly, Disp: 90 tablet, Rfl: 2    levothyroxine (SYNTHROID) 50 MCG tablet, Take 1 tablet by mouth Daily (Patient taking differently: Take 50 mcg by mouth nightly ), Disp: 90 tablet, Rfl: 2    sertraline (ZOLOFT) 100 MG tablet, Take 1 tablet by mouth daily Indications: takes 2 tablets at hs Take 2 tablet at hs po (Patient taking differently: Take 100 mg by mouth nightly Indications: takes 2 tablets at hs Take 2 tablet at hs po), Disp: 180 tablet, Rfl: 3    naproxen (NAPROSYN) 500 MG tablet, Take 1 tablet by mouth 2 times daily (with meals), Disp: 180 tablet, Rfl: 1    Cholecalciferol (VITAMIN D-3) 5000 UNITS TABS, Take  by mouth., Disp: , Rfl:   Allergies   Allergen Reactions    Mobic [Meloxicam] Itching     Burning/Itching of there scalp, palms, back and feet.      Morphine Sulfate Itching    Percocet [Oxycodone-Acetaminophen] Itching    Vicodin [Hydrocodone-Acetaminophen] Itching     Social History     Socioeconomic History    Marital status:      Spouse name: Ambrosio    Number of children: 2    Years of education: 15    Highest education level: Not on file   Occupational History    Occupation: LPN   Tobacco Use    Smoking status: Never Smoker    Smokeless tobacco: Never Used   Vaping Use    Vaping Use: Never used   Substance and Sexual Activity    Alcohol use: Yes     Comment: occasionally    Drug use: No    Sexual activity: Yes     Partners: Male   Other Topics Concern    Not on file   Social History Narrative    Patient lives at home with . She is independent with ADL's, and does require the use of an assistive device. Social Determinants of Health     Financial Resource Strain:     Difficulty of Paying Living Expenses:    Food Insecurity:     Worried About Running Out of Food in the Last Year:     [address] in the Last Year:    Transportation Needs:     Lack of Transportation (Medical):  Lack of Transportation (Non-Medical):    Physical Activity:     Days of Exercise per Week:     Minutes of Exercise per Session:    Stress:     Feeling of Stress :    Social Connections:     Frequency of Communication with Friends and Family:     Frequency of Social Gatherings with Friends and Family:     Attends [employer] Services:     Active Member of Clubs or Organizations:     Attends Club or Organization Meetings:     Marital Status:    Intimate Partner Violence:     Fear of Current or Ex-Partner:     Emotionally Abused:     Physically Abused:     Sexually Abused:      Family History   Problem Relation Age of Onset    Heart Disease Brother         MI with stents    Other Brother         Frontal lobe lesion    Heart Disease Maternal Grandfather     Heart Disease Paternal Grandfather     Cancer Other     Depression Other     Mental Illness Other     Cancer Mother         breast with bone and liver mets    Bipolar Disorder Father     Other Father         Aspiration Pneumonia    Hypertension Sister     Depression Sister     Anxiety Disorder Sister        Review of Systems:     Skin: (-) rash,(-) psoriasis,(-) eczema, (-)skin cancer.    Musculoskeletal: (-) fractures,  (-) dislocations,(-) collagen vascular disease, (-) fibromyalgia, (-) multiple sclerosis, (-) muscular dystrophy, (-) RSD,(-) joint pain (-)swelling, (-) joint pain,swelling. Neurologic: (-) epilepsy, (-)seizures,(-) brain tumor,(-) TIA, (-)stroke, (-)headaches, (-)Parkinson disease,(-) memory loss, (-) LOC. Cardiovascular: (-) Chest pain, (-) swelling in legs/feet, (-) SOB, (-) cramping in legs/feet with walking. Constitutional:  The patient is alert and oriented x 3, appears to be stated age and in no distress. Temp 98 °F (36.7 °C)   Ht 5' 6\" (1.676 m)   Wt 266 lb (120.7 kg)   LMP 10/13/2016   BMI 42.93 kg/m²     Skin:  Upon inspection: the skin appears warm, dry and intact. There is not a previous scar over the affected area. There is not any cellulitis, lymphedema or cutaneous lesions noted in the lower extremities. Upon palpation there is no induration noted. Neurologic:  Gait: normal;  Motor exam of the lower extremities show ; quadriceps, hamstrings, foot dorsi and plantar flexors intact R.  5/5 and L. 5/5. Deep tendon reflexes are 2/4 at the knees and 2/4 at the ankles with strong extensor hallicus longus motor strength bilaterally. Sensory to both feet is intact to all sensory roots. Cardiovascular: The vascular exam is normal and is well perfused to distal extremities. Distal pulses DP/PT: R. 2+; L. 2+. There is cap refill noted less than two seconds in all digits. There is not edema of the bilateral lower extremities. There is not varicosities noted in the distal extremities. Lymph:  Upon palpation,  there is no lymphadenopathy noted in bilateral lower extremities. Musculoskeletal:  Gait: normal; examination of the nails and digits reveal no cyanosis or clubbing    Lumbar exam:  On visual inspection, there is no deformity of the spine. full range of motion, no tenderness, palpable spasm or pain on motion. Special tests: Straight Leg Raise negative, Albino testnegative. Hip exam:  Upon inspection, there is no deformity noted. Upon palpation there is not tenderness.   ROM: is   full and

## 2021-09-09 ENCOUNTER — TELEPHONE (OUTPATIENT)
Dept: ORTHOPEDIC SURGERY | Age: 61
End: 2021-09-09

## 2021-09-09 NOTE — TELEPHONE ENCOUNTER
LM to reschedule patient stating [facility] had an emergency and that we will need to reschedule.

## 2021-09-30 ENCOUNTER — OFFICE VISIT (OUTPATIENT)
Dept: ORTHOPEDIC SURGERY | Age: 61
End: 2021-09-30
Payer: COMMERCIAL

## 2021-09-30 VITALS — BODY MASS INDEX: 42.75 KG/M2 | WEIGHT: 266 LBS | TEMPERATURE: 98 F | HEIGHT: 66 IN

## 2021-09-30 DIAGNOSIS — M17.11 PRIMARY OSTEOARTHRITIS OF RIGHT KNEE: Primary | ICD-10-CM

## 2021-09-30 PROCEDURE — 99213 OFFICE O/P EST LOW 20 MIN: CPT | Performed by: NURSE PRACTITIONER

## 2021-09-30 PROCEDURE — 20610 DRAIN/INJ JOINT/BURSA W/O US: CPT | Performed by: NURSE PRACTITIONER

## 2021-09-30 RX ORDER — TRIAMCINOLONE ACETONIDE 40 MG/ML
40 INJECTION, SUSPENSION INTRA-ARTICULAR; INTRAMUSCULAR ONCE
Status: COMPLETED | OUTPATIENT
Start: 2021-09-30 | End: 2021-09-30

## 2021-09-30 RX ADMIN — TRIAMCINOLONE ACETONIDE 40 MG: 40 INJECTION, SUSPENSION INTRA-ARTICULAR; INTRAMUSCULAR at 12:12

## 2021-09-30 NOTE — PROGRESS NOTES
Chief Complaint   Patient presents with    Knee Pain     Right knee cortisone injection       Josie Johnson returns today for follow-up of her right knee pain. she reports this is worse than when I saw her last.  The patient's pain level is a 8/10. The previous treatment was successful.     Past Medical History:   Diagnosis Date    Alopecia areata     Anxiety     Depression     Hyperlipidemia     Nausea & vomiting     Obesity, Class III, BMI 40-49.9 (morbid obesity) (HCC)     Osteoarthritis     PVC (premature ventricular contraction)     Sleep apnea     CPAP    Thyroid disease      Past Surgical History:   Procedure Laterality Date     SECTION      x2    CHOLECYSTECTOMY      COLONOSCOPY  14    bx done Dr Garry Travis  2011         KNEE ARTHROSCOPY      NERVE BLOCK Bilateral 3/5/14    lumbar facet #1    NERVE BLOCK  14    paravertebral facet bilateral lumbar #2    NERVE BLOCK N/A 14    cerv #1    NERVE BLOCK N/A 2014    cervical epidural  #2    NERVE BLOCK N/A 14    cerv #3    NERVE BLOCK Bilateral 2020    intra-articular facet joint injection     OTHER SURGICAL HISTORY  14    Radiofrequency right lumbar L3-4, L4-5, L5-S1    OTHER SURGICAL HISTORY Left 2014    lumbar radiofrequency    PAIN MANAGEMENT PROCEDURE Bilateral 2020    BILATERAL INTRA-ARTICULAR FACET JOINT INJECTION WITH FLUOROSCOPIC GUIDANCE AT L3-4, L4-5, L5-S1 (CPT W9121879, [id number]) performed by Jani Escobar DO at [facility] Left 2021    LEFT KNEE TOTAL KNEE ARTHROPLASTY [facility] & [facility]) performed by Archana Francois DO at [address] [facility]  14    ashly ,bx sm bowel  Dr Reyna Mireles       Current Outpatient Medications:     simvastatin (ZOCOR) 20 MG tablet, Take 1 tablet by mouth nightly, Disp: 90 tablet, Rfl: 1    traZODone (DESYREL) 150 MG tablet, Take 1 tablet by mouth nightly, Disp: 90 tablet, Rfl: 1    famotidine (PEPCID) 20 MG tablet, Take 1 tablet by mouth 2 times daily, Disp: 180 tablet, Rfl: 1    diclofenac 1 % CREA, Apply 2 g topically 4 times daily To affected area, Disp: 120 g, Rfl: 3    tiZANidine (ZANAFLEX) 4 MG tablet, Take 1 tablet by mouth every 8 hours as needed (MUSCLE SPASMS), Disp: 90 tablet, Rfl: 1    diclofenac (VOLTAREN) 50 MG EC tablet, TAKE 1 TABLET BY MOUTH 2 TIMES A DAY WITH MEALS, Disp: 60 tablet, Rfl: 3    ondansetron (ZOFRAN) 4 MG tablet, Take 1 tablet by mouth 3 times daily as needed for Nausea or Vomiting, Disp: 15 tablet, Rfl: 0    atenolol (TENORMIN) 50 MG tablet, Take 1 tablet by mouth nightly, Disp: 90 tablet, Rfl: 2    levothyroxine (SYNTHROID) 50 MCG tablet, Take 1 tablet by mouth Daily (Patient taking differently: Take 50 mcg by mouth nightly ), Disp: 90 tablet, Rfl: 2    sertraline (ZOLOFT) 100 MG tablet, Take 1 tablet by mouth daily Indications: takes 2 tablets at hs Take 2 tablet at hs po (Patient taking differently: Take 100 mg by mouth nightly Indications: takes 2 tablets at hs Take 2 tablet at hs po), Disp: 180 tablet, Rfl: 3    naproxen (NAPROSYN) 500 MG tablet, Take 1 tablet by mouth 2 times daily (with meals), Disp: 180 tablet, Rfl: 1    Cholecalciferol (VITAMIN D-3) 5000 UNITS TABS, Take  by mouth., Disp: , Rfl:   Allergies   Allergen Reactions    Mobic [Meloxicam] Itching     Burning/Itching of there scalp, palms, back and feet.      Morphine Sulfate Itching    Percocet [Oxycodone-Acetaminophen] Itching    Vicodin [Hydrocodone-Acetaminophen] Itching     Social History     Socioeconomic History    Marital status:      Spouse name: Ambrosio    Number of children: 2    Years of education: 15    Highest education level: Not on file   Occupational History    Occupation: LPN   Tobacco Use    Smoking status: Never Smoker    Smokeless tobacco: Never Used   Vaping Use    Vaping Use: Never used   Substance and Sexual Activity    Alcohol use: Yes     Comment: occasionally    Drug use: No    Sexual activity: Yes     Partners: Male   Other Topics Concern    Not on file   Social History Narrative    Patient lives at home with . She is independent with ADL's, and does require the use of an assistive device. Social Determinants of Health     Financial Resource Strain:     Difficulty of Paying Living Expenses:    Food Insecurity:     Worried About Running Out of Food in the Last Year:     [address] in the Last Year:    Transportation Needs:     Lack of Transportation (Medical):  Lack of Transportation (Non-Medical):    Physical Activity:     Days of Exercise per Week:     Minutes of Exercise per Session:    Stress:     Feeling of Stress :    Social Connections:     Frequency of Communication with Friends and Family:     Frequency of Social Gatherings with Friends and Family:     Attends [employer] Services:     Active Member of Clubs or Organizations:     Attends Club or Organization Meetings:     Marital Status:    Intimate Partner Violence:     Fear of Current or Ex-Partner:     Emotionally Abused:     Physically Abused:     Sexually Abused:      Family History   Problem Relation Age of Onset    Heart Disease Brother         MI with stents    Other Brother         Frontal lobe lesion    Heart Disease Maternal Grandfather     Heart Disease Paternal Grandfather     Cancer Other     Depression Other     Mental Illness Other     Cancer Mother         breast with bone and liver mets    Bipolar Disorder Father     Other Father         Aspiration Pneumonia    Hypertension Sister     Depression Sister     Anxiety Disorder Sister        Review of Systems:     Skin: (-) rash,(-) psoriasis,(-) eczema, (-)skin cancer.    Musculoskeletal: (-) fractures,  (-) dislocations,(-) collagen vascular disease, (-) fibromyalgia, (-) multiple sclerosis, (-) muscular dystrophy, (-) RSD,(-) joint pain (-)swelling, (-) joint pain,swelling. Neurologic: (-) epilepsy, (-)seizures,(-) brain tumor,(-) TIA, (-)stroke, (-)headaches, (-)Parkinson disease,(-) memory loss, (-) LOC. Cardiovascular: (-) Chest pain, (-) swelling in legs/feet, (-) SOB, (-) cramping in legs/feet with walking. Constitutional:  The patient is alert and oriented x 3, appears to be stated age and in no distress. Temp 98 °F (36.7 °C)   Ht 5' 6\" (1.676 m)   Wt 266 lb (120.7 kg)   LMP 10/13/2016   BMI 42.93 kg/m²     Skin:  Upon inspection: the skin appears warm, dry and intact. There is not a previous scar over the affected area. There is not any cellulitis, lymphedema or cutaneous lesions noted in the lower extremities. Upon palpation there is no induration noted. Neurologic:  Gait: normal;  Motor exam of the lower extremities show ; quadriceps, hamstrings, foot dorsi and plantar flexors intact R.  5/5 and L. 5/5. Deep tendon reflexes are 2/4 at the knees and 2/4 at the ankles with strong extensor hallicus longus motor strength bilaterally. Sensory to both feet is intact to all sensory roots. Cardiovascular: The vascular exam is normal and is well perfused to distal extremities. Distal pulses DP/PT: R. 2+; L. 2+. There is cap refill noted less than two seconds in all digits. There is not edema of the bilateral lower extremities. There is not varicosities noted in the distal extremities. Lymph:  Upon palpation,  there is no lymphadenopathy noted in bilateral lower extremities. Musculoskeletal:  Gait: antalgic; examination of the nails and digits reveal no cyanosis or clubbing    Lumbar exam:  On visual inspection, there is no deformity of the spine. full range of motion, no tenderness, palpable spasm or pain on motion. Special tests: Straight Leg Raise negative, Albino testnegative. Hip exam:  Upon inspection, there is no deformity noted. Upon palpation there is not tenderness.   ROM: is   full and semetrical.

## 2021-11-09 DIAGNOSIS — Z96.652 S/P TOTAL KNEE ARTHROPLASTY, LEFT: Primary | ICD-10-CM

## 2021-11-11 ENCOUNTER — OFFICE VISIT (OUTPATIENT)
Dept: ORTHOPEDIC SURGERY | Age: 61
End: 2021-11-11
Payer: COMMERCIAL

## 2021-11-11 VITALS — WEIGHT: 266 LBS | BODY MASS INDEX: 42.75 KG/M2 | HEIGHT: 66 IN | TEMPERATURE: 98 F

## 2021-11-11 DIAGNOSIS — Z96.652 S/P TOTAL KNEE ARTHROPLASTY, LEFT: Primary | ICD-10-CM

## 2021-11-11 PROCEDURE — 99213 OFFICE O/P EST LOW 20 MIN: CPT | Performed by: ORTHOPAEDIC SURGERY

## 2021-11-11 RX ORDER — TRAMADOL HYDROCHLORIDE 50 MG/1
50 TABLET ORAL EVERY 6 HOURS PRN
Qty: 28 TABLET | Refills: 0 | Status: SHIPPED | OUTPATIENT
Start: 2021-11-11 | End: 2021-11-18

## 2021-11-11 NOTE — PROGRESS NOTES
Chief Complaint   Patient presents with    Knee Pain     Left TKA DOS 2021, states of pain in the mid patella       Rich Lopes returns today for follow-up of her left knee pain. She reports she is the same as her last visit. She states she has pain mid patella and tightness.     Past Medical History:   Diagnosis Date    Alopecia areata     Anxiety     Depression     Hyperlipidemia     Nausea & vomiting     Obesity, Class III, BMI 40-49.9 (morbid obesity) (HCC)     Osteoarthritis     PVC (premature ventricular contraction)     Sleep apnea     CPAP    Thyroid disease      Past Surgical History:   Procedure Laterality Date     SECTION      x2    CHOLECYSTECTOMY      COLONOSCOPY  14    bx done Dr Monalisa Bello  2011         KNEE ARTHROSCOPY      NERVE BLOCK Bilateral 3/5/14    lumbar facet #1    NERVE BLOCK  14    paravertebral facet bilateral lumbar #2    NERVE BLOCK N/A  9     cerv #1    NERVE BLOCK N/A 2014    cervical epidural  #2    NERVE BLOCK N/A 14    cerv #3    NERVE BLOCK Bilateral 2020    intra-articular facet joint injection     OTHER SURGICAL HISTORY  14    Radiofrequency right lumbar L3-4, L4-5, L5-S1    OTHER SURGICAL HISTORY Left 2014    lumbar radiofrequency    PAIN MANAGEMENT PROCEDURE Bilateral 2020    BILATERAL INTRA-ARTICULAR FACET JOINT INJECTION WITH FLUOROSCOPIC GUIDANCE AT L3-4, L4-5, L5-S1 (CPT I0437280, [id number]) performed by Jamila Larose DO at [address] [place] [address] Left 2021    LEFT KNEE TOTAL KNEE ARTHROPLASTY [facility]) performed by Gloria [facility]antigua DO [facility].  14    ashly ,bx sm bowel  Dr Fady Weaver       Current Outpatient Medications:     simvastatin (ZOCOR) 20 MG tablet, Take 1 tablet by mouth nightly, Disp: 90 tablet, Rfl: 1    traZODone (DESYREL) 150 MG tablet, Take 1 tablet by mouth nightly, Disp: 90 tablet, Rfl: 1    famotidine (PEPCID) 20 MG tablet, Take 1 tablet by mouth 2 times daily, Disp: 180 tablet, Rfl: 1    diclofenac 1 % CREA, Apply 2 g topically 4 times daily To affected area, Disp: 120 g, Rfl: 3    tiZANidine (ZANAFLEX) 4 MG tablet, Take 1 tablet by mouth every 8 hours as needed (MUSCLE SPASMS), Disp: 90 tablet, Rfl: 1    diclofenac (VOLTAREN) 50 MG EC tablet, TAKE 1 TABLET BY MOUTH 2 TIMES A DAY WITH MEALS, Disp: 60 tablet, Rfl: 3    ondansetron (ZOFRAN) 4 MG tablet, Take 1 tablet by mouth 3 times daily as needed for Nausea or Vomiting, Disp: 15 tablet, Rfl: 0    atenolol (TENORMIN) 50 MG tablet, Take 1 tablet by mouth nightly, Disp: 90 tablet, Rfl: 2    levothyroxine (SYNTHROID) 50 MCG tablet, Take 1 tablet by mouth Daily (Patient taking differently: Take 50 mcg by mouth nightly ), Disp: 90 tablet, Rfl: 2    sertraline (ZOLOFT) 100 MG tablet, Take 1 tablet by mouth daily Indications: takes 2 tablets at hs Take 2 tablet at hs po (Patient taking differently: Take 100 mg by mouth nightly Indications: takes 2 tablets at hs Take 2 tablet at hs po), Disp: 180 tablet, Rfl: 3    naproxen (NAPROSYN) 500 MG tablet, Take 1 tablet by mouth 2 times daily (with meals), Disp: 180 tablet, Rfl: 1    Cholecalciferol (VITAMIN D-3) 5000 UNITS TABS, Take  by mouth., Disp: , Rfl:   Allergies   Allergen Reactions    Mobic [Meloxicam] Itching     Burning/Itching of there scalp, palms, back and feet.      Morphine Sulfate Itching    Percocet [Oxycodone-Acetaminophen] Itching    Vicodin [Hydrocodone-Acetaminophen] Itching     Social History     Socioeconomic History    Marital status:      Spouse name: Ambrosio    Number of children: 2    Years of education: 15    Highest education level: Not on file   Occupational History    Occupation: LPN   Tobacco Use    Smoking status: Never Smoker    Smokeless tobacco: Never Used   Vaping Use    Vaping Use: Never used   Substance and Sexual Activity    Alcohol use: Yes     Comment: occasionally    Drug use: No    Sexual activity: Yes     Partners: Male   Other Topics Concern    Not on file   Social History Narrative    Patient lives at home with . She is independent with ADL's, and does require the use of an assistive device. Social Determinants of Health     Financial Resource Strain:     Difficulty of Paying Living Expenses: Not on file   Food Insecurity:     Worried About Running Out of Food in the Last Year: Not on file    Cristian of Food in the Last Year: Not on file   Transportation Needs:     Lack of Transportation (Medical): Not on file    Lack of Transportation (Non-Medical):  Not on file   Physical Activity:     Days of Exercise per Week: Not on file    Minutes of Exercise per Session: Not on file   Stress:     Feeling of Stress : Not on file   Social Connections:     Frequency of Communication with Friends and Family: Not on file    Frequency of Social Gatherings with Friends and Family: Not on file    Attends [employer] Services: Not on file    Active Member of [address] [place] [address] [employer] or Organizations: Not on file    Attends Club or Organization Meetings: Not on file    Marital Status: Not on file   Intimate Partner Violence:     Fear of Current or Ex-Partner: Not on file    Emotionally Abused: Not on file    Physically Abused: Not on file    Sexually Abused: Not on file   Housing Stability:     Unable to Pay for Housing in the Last Year: Not on file    Number of [place] in the Last Year: Not on file    Unstable Housing in the Last Year: Not on file     Family History   Problem Relation Age of Onset    Heart Disease Brother         MI with stents    Other Brother         Frontal lobe lesion    Heart Disease Maternal Grandfather     Heart Disease Paternal Grandfather     Cancer Other     Depression Other     Mental Illness Other     Cancer Mother         breast with bone and liver mets    Bipolar Disorder Father     Other Father         Aspiration Pneumonia    Hypertension Sister     Depression Sister     Anxiety Disorder Sister        Review of Systems:     Skin: (-) rash,(-) psoriasis,(-) eczema, (-)skin cancer. Musculoskeletal: (-) fractures,  (-) dislocations,(-) collagen vascular disease, (-) fibromyalgia, (-) multiple sclerosis, (-) muscular dystrophy, (-) RSD,(-) joint pain (-)swelling, (-) joint pain,swelling. Neurologic: (-) epilepsy, (-)seizures,(-) brain tumor,(-) TIA, (-)stroke, (-)headaches, (-)Parkinson disease,(-) memory loss, (-) LOC. Cardiovascular: (-) Chest pain, (-) swelling in legs/feet, (-) SOB, (-) cramping in legs/feet with walking. Constitutional:  The patient is alert and oriented x 3, appears to be stated age and in no distress. Temp 98 °F (36.7 °C)   Ht 5' 6\" (1.676 m)   Wt 266 lb (120.7 kg)   LMP 10/13/2016   BMI 42.93 kg/m²     Skin:  Upon inspection: the skin appears warm, dry and intact. There is not a previous scar over the affected area. There is not any cellulitis, lymphedema or cutaneous lesions noted in the lower extremities. Upon palpation there is no induration noted. Neurologic:  Gait: normal;  Motor exam of the lower extremities show ; quadriceps, hamstrings, foot dorsi and plantar flexors intact R.  5/5 and L. 5/5. Deep tendon reflexes are 2/4 at the knees and 2/4 at the ankles with strong extensor hallicus longus motor strength bilaterally. Sensory to both feet is intact to all sensory roots. Cardiovascular: The vascular exam is normal and is well perfused to distal extremities. Distal pulses DP/PT: R. 2+; L. 2+. There is cap refill noted less than two seconds in all digits. There is not edema of the bilateral lower extremities. There is not varicosities noted in the distal extremities. Lymph:  Upon palpation,  there is no lymphadenopathy noted in bilateral lower extremities.       Musculoskeletal:  Gait: normal; examination of the nails and digits reveal no cyanosis or clubbing    Lumbar exam:  On visual inspection, there is no deformity of the spine. full range of motion, no tenderness, palpable spasm or pain on motion. Special tests: Straight Leg Raise negative, Albino testnegative. Hip exam:  Upon inspection, there is no deformity noted. Upon palpation there is not tenderness. ROM: is   full and semetrical.   Strength: Hip Flexors 5/5; Hip Abductors 5/5; Hip Adduction 5/5. Knee exam:  Left knee exam shows;  range of motion of R. Knee is 0 to 115, and L. Knee is 0 to 120. She does have  pain on motion, effusion is mild, there is not tenderness over the  global region, there are not any masses, there is not ligamentous instability, there is not  deformity noted. Knee exam: right positive for moderate crepitations, some mild tenderness laxity is not noted with  stress. R. Knee:  Lachman's negative, Anterior Drawer negative, Posterior Drawer negative  Gloria's negative, Thallasy  negative,   PF grind test negative, Apprehension test negative, Patellar J sign  negative  L. Knee:  Lachman's negative, Anterior Drawer negative, Posterior Drawer negative  Gloria's negative, Thallasy  negative,   PF grind test negative, Apprehension test negative,  Patellar J sign  negative    Xrays:   S/p knee arthroplasty with no signs of loosening    MRI:    n/a  Radiographic findings reviewed with patient    Impression:  Encounter Diagnosis   Name Primary?  S/P total knee arthroplasty, left Yes       Plan:   Natural history and expected course discussed. Questions answered. Educational materials distributed. Rest, ice, compression, and elevation (RICE) therapy. Reduction in offending activity.    Hep  Activity as tolerated  F/u in April

## 2021-12-02 DIAGNOSIS — R53.83 FATIGUE, UNSPECIFIED TYPE: ICD-10-CM

## 2021-12-02 DIAGNOSIS — E07.9 THYROID DISEASE: ICD-10-CM

## 2021-12-02 DIAGNOSIS — E78.2 MIXED HYPERLIPIDEMIA: ICD-10-CM

## 2021-12-02 DIAGNOSIS — K21.9 GASTROESOPHAGEAL REFLUX DISEASE WITHOUT ESOPHAGITIS: ICD-10-CM

## 2021-12-02 DIAGNOSIS — R73.03 PREDIABETES: ICD-10-CM

## 2021-12-02 DIAGNOSIS — E55.9 VITAMIN D DEFICIENCY: ICD-10-CM

## 2021-12-02 LAB
ALBUMIN SERPL-MCNC: 4.5 G/DL (ref 3.5–5.2)
ALP BLD-CCNC: 68 U/L (ref 35–104)
ALT SERPL-CCNC: 16 U/L (ref 0–32)
ANION GAP SERPL CALCULATED.3IONS-SCNC: 17 MMOL/L (ref 7–16)
AST SERPL-CCNC: 18 U/L (ref 0–31)
BACTERIA: ABNORMAL /HPF
BASOPHILS ABSOLUTE: 0.09 E9/L (ref 0–0.2)
BASOPHILS RELATIVE PERCENT: 1 % (ref 0–2)
BILIRUB SERPL-MCNC: 0.4 MG/DL (ref 0–1.2)
BILIRUBIN URINE: NEGATIVE
BLOOD, URINE: NEGATIVE
BUN BLDV-MCNC: 15 MG/DL (ref 6–23)
CALCIUM SERPL-MCNC: 9.4 MG/DL (ref 8.6–10.2)
CHLORIDE BLD-SCNC: 104 MMOL/L (ref 98–107)
CHOLESTEROL, TOTAL: 196 MG/DL (ref 0–199)
CLARITY: CLEAR
CO2: 21 MMOL/L (ref 22–29)
COLOR: YELLOW
CREAT SERPL-MCNC: 0.8 MG/DL (ref 0.5–1)
EOSINOPHILS ABSOLUTE: 0.12 E9/L (ref 0.05–0.5)
EOSINOPHILS RELATIVE PERCENT: 1.3 % (ref 0–6)
GFR AFRICAN AMERICAN: >60
GFR NON-AFRICAN AMERICAN: >60 ML/MIN/1.73
GLUCOSE BLD-MCNC: 91 MG/DL (ref 74–99)
GLUCOSE URINE: NEGATIVE MG/DL
HBA1C MFR BLD: 5.6 % (ref 4–5.6)
HCT VFR BLD CALC: 42.9 % (ref 34–48)
HDLC SERPL-MCNC: 44 MG/DL
HEMOGLOBIN: 13.2 G/DL (ref 11.5–15.5)
IMMATURE GRANULOCYTES #: 0.03 E9/L
IMMATURE GRANULOCYTES %: 0.3 % (ref 0–5)
KETONES, URINE: NEGATIVE MG/DL
LDL CHOLESTEROL CALCULATED: 116 MG/DL (ref 0–99)
LEUKOCYTE ESTERASE, URINE: NEGATIVE
LYMPHOCYTES ABSOLUTE: 2.29 E9/L (ref 1.5–4)
LYMPHOCYTES RELATIVE PERCENT: 25.1 % (ref 20–42)
MAGNESIUM: 2.4 MG/DL (ref 1.6–2.6)
MCH RBC QN AUTO: 30.8 PG (ref 26–35)
MCHC RBC AUTO-ENTMCNC: 30.8 % (ref 32–34.5)
MCV RBC AUTO: 100 FL (ref 80–99.9)
MONOCYTES ABSOLUTE: 0.58 E9/L (ref 0.1–0.95)
MONOCYTES RELATIVE PERCENT: 6.4 % (ref 2–12)
NEUTROPHILS ABSOLUTE: 6.01 E9/L (ref 1.8–7.3)
NEUTROPHILS RELATIVE PERCENT: 65.9 % (ref 43–80)
NITRITE, URINE: POSITIVE
PDW BLD-RTO: 13.1 FL (ref 11.5–15)
PH UA: 7 (ref 5–9)
PHOSPHORUS: 4.1 MG/DL (ref 2.5–4.5)
PLATELET # BLD: 292 E9/L (ref 130–450)
PMV BLD AUTO: 11.1 FL (ref 7–12)
POTASSIUM SERPL-SCNC: 4.6 MMOL/L (ref 3.5–5)
PROTEIN UA: NEGATIVE MG/DL
RBC # BLD: 4.29 E12/L (ref 3.5–5.5)
RBC UA: ABNORMAL /HPF (ref 0–2)
SODIUM BLD-SCNC: 142 MMOL/L (ref 132–146)
SPECIFIC GRAVITY UA: 1.02 (ref 1–1.03)
T4 FREE: 1.16 NG/DL (ref 0.93–1.7)
TOTAL PROTEIN: 7.1 G/DL (ref 6.4–8.3)
TRIGL SERPL-MCNC: 178 MG/DL (ref 0–149)
TSH SERPL DL<=0.05 MIU/L-ACNC: 1.04 UIU/ML (ref 0.27–4.2)
UROBILINOGEN, URINE: 0.2 E.U./DL
VITAMIN D 25-HYDROXY: 34 NG/ML (ref 30–100)
VLDLC SERPL CALC-MCNC: 36 MG/DL
WBC # BLD: 9.1 E9/L (ref 4.5–11.5)
WBC UA: ABNORMAL /HPF (ref 0–5)

## 2022-01-06 ENCOUNTER — NURSE ONLY (OUTPATIENT)
Dept: ORTHOPEDIC SURGERY | Age: 62
End: 2022-01-06
Payer: COMMERCIAL

## 2022-01-06 VITALS — HEIGHT: 66 IN | TEMPERATURE: 98 F | WEIGHT: 263 LBS | BODY MASS INDEX: 42.27 KG/M2

## 2022-01-06 DIAGNOSIS — M17.11 PRIMARY OSTEOARTHRITIS OF RIGHT KNEE: Primary | ICD-10-CM

## 2022-01-06 PROCEDURE — 20610 DRAIN/INJ JOINT/BURSA W/O US: CPT | Performed by: NURSE PRACTITIONER

## 2022-01-06 PROCEDURE — 99213 OFFICE O/P EST LOW 20 MIN: CPT | Performed by: NURSE PRACTITIONER

## 2022-01-06 RX ORDER — TRIAMCINOLONE ACETONIDE 40 MG/ML
40 INJECTION, SUSPENSION INTRA-ARTICULAR; INTRAMUSCULAR ONCE
Status: COMPLETED | OUTPATIENT
Start: 2022-01-06 | End: 2022-01-06

## 2022-01-06 RX ADMIN — TRIAMCINOLONE ACETONIDE 40 MG: 40 INJECTION, SUSPENSION INTRA-ARTICULAR; INTRAMUSCULAR at 15:26

## 2022-01-06 NOTE — PROGRESS NOTES
Chief Complaint   Patient presents with    Knee Pain     RIght knee cortisone injection       Nadege Duke returns today for follow-up of her right knee pain. she reports this is worse than when I saw her last.  The patient's pain level is a 8/10. The previous treatment was successful of cortisone but only for 6-8 weeks.  She has had zilretta in the past with good results for 3 months    Past Medical History:   Diagnosis Date    Alopecia areata     Anxiety     Depression     Hyperlipidemia     Nausea & vomiting     Obesity, Class III, BMI 40-49.9 (morbid obesity) (HCC)     Osteoarthritis     PVC (premature ventricular contraction)     Sleep apnea     CPAP    Thyroid disease      Past Surgical History:   Procedure Laterality Date     SECTION      x2    CHOLECYSTECTOMY      COLONOSCOPY  14    bx done Dr Dede Mcgee  2011         KNEE ARTHROSCOPY      NERVE BLOCK Bilateral 3/5/14    lumbar facet #1    NERVE BLOCK  14    paravertebral facet bilateral lumbar #2    NERVE BLOCK N/A 4 9 14    cerv #1    NERVE BLOCK N/A 2014    cervical epidural  #2    NERVE BLOCK N/A 4 23 14    cerv #3    NERVE BLOCK Bilateral 2020    intra-articular facet joint injection     OTHER SURGICAL HISTORY  14    Radiofrequency right lumbar L3-4, L4-5, L5-S1    OTHER SURGICAL HISTORY Left 2014    lumbar radiofrequency    PAIN MANAGEMENT PROCEDURE Bilateral 2020    BILATERAL INTRA-ARTICULAR FACET JOINT INJECTION WITH FLUOROSCOPIC GUIDANCE AT L3-4, L4-5, L5-S1 (CPT V5414080, [id number]) performed by Odalys Palma DO at [facility] Left 2021    LEFT KNEE TOTAL KNEE ARTHROPLASTY [facility] & NEPHEW) performed by Franko Milner DO at [address] [facility]  14    ashly ,bx sm bowel  Dr Cesilia Martinez       Current Outpatient Medications:     simvastatin (ZOCOR) 20 MG tablet, Take 1 tablet by mouth nightly, Disp: 90 tablet, Rfl: 1    famotidine (PEPCID) 20 MG tablet, Take 1 tablet by mouth 2 times daily, Disp: 180 tablet, Rfl: 1    diclofenac 1 % CREA, Apply 2 g topically 4 times daily To affected area, Disp: 120 g, Rfl: 3    sertraline (ZOLOFT) 100 MG tablet, Take 1 tablet by mouth daily Indications: takes 2 tablets at hs Take 2 tablet at hs po, Disp: 180 tablet, Rfl: 3    atenolol (TENORMIN) 50 MG tablet, Take 1 tablet by mouth nightly, Disp: 90 tablet, Rfl: 2    levothyroxine (SYNTHROID) 50 MCG tablet, Take 1 tablet by mouth Daily, Disp: 90 tablet, Rfl: 2    melatonin 3 MG TABS tablet, Take 1-3 tablets nightly as needed for insomnia., Disp: 90 tablet, Rfl: 0    traZODone (DESYREL) 150 MG tablet, Take 1 tablet by mouth nightly, Disp: 90 tablet, Rfl: 1    diclofenac (VOLTAREN) 50 MG EC tablet, TAKE 1 TABLET BY MOUTH TWO TIMES A DAY WITH MEALS, Disp: 60 tablet, Rfl: 3    tiZANidine (ZANAFLEX) 4 MG tablet, Take 1 tablet by mouth every 8 hours as needed (MUSCLE SPASMS), Disp: 90 tablet, Rfl: 1    ondansetron (ZOFRAN) 4 MG tablet, Take 1 tablet by mouth 3 times daily as needed for Nausea or Vomiting, Disp: 15 tablet, Rfl: 0    naproxen (NAPROSYN) 500 MG tablet, Take 1 tablet by mouth 2 times daily (with meals), Disp: 180 tablet, Rfl: 1    Cholecalciferol (VITAMIN D-3) 5000 UNITS TABS, Take  by mouth., Disp: , Rfl:   Allergies   Allergen Reactions    Mobic [Meloxicam] Itching     Burning/Itching of there scalp, palms, back and feet.      Morphine Sulfate Itching    Percocet [Oxycodone-Acetaminophen] Itching    Vicodin [Hydrocodone-Acetaminophen] Itching     Social History     Socioeconomic History    Marital status:      Spouse name: Ambrosio    Number of children: 2    Years of education: 15    Highest education level: Not on file   Occupational History    Occupation: LPN   Tobacco Use    Smoking status: Never Smoker    Smokeless tobacco: Never Used   Vaping Use    Vaping Use: Never used   Substance and Sexual Activity    Alcohol use: Yes     Comment: occasionally    Drug use: No    Sexual activity: Yes     Partners: Male   Other Topics Concern    Not on file   Social History Narrative    Patient lives at home with . She is independent with ADL's, and does require the use of an assistive device. Social Determinants of Health     Financial Resource Strain: Low Risk     Difficulty of Paying Living Expenses: Not hard at all   Food Insecurity: No Food Insecurity    Worried About Running Out of Food in the Last Year: Never true    Cristian of Food in the Last Year: Never true   Transportation Needs:     Lack of Transportation (Medical): Not on file    Lack of Transportation (Non-Medical):  Not on file   Physical Activity:     Days of Exercise per Week: Not on file    Minutes of Exercise per Session: Not on file   Stress:     Feeling of Stress : Not on file   Social Connections:     Frequency of Communication with Friends and Family: Not on file    Frequency of Social Gatherings with Friends and Family: Not on file    Attends [employer] Services: Not on file    Active Member of [address] [place] [address] [employer] or Organizations: Not on file    Attends Club or Organization Meetings: Not on file    Marital Status: Not on file   Intimate Partner Violence:     Fear of Current or Ex-Partner: Not on file    Emotionally Abused: Not on file    Physically Abused: Not on file    Sexually Abused: Not on file   Housing Stability:     Unable to Pay for Housing in the Last Year: Not on file    Number of [place] in the Last Year: Not on file    Unstable Housing in the Last Year: Not on file     Family History   Problem Relation Age of Onset    Heart Disease Brother         MI with stents    Other Brother         Frontal lobe lesion    Heart Disease Maternal Grandfather     Heart Disease Paternal Grandfather     Cancer Other     Depression Other     Mental Illness Other     Cancer Mother breast with bone and liver mets    Bipolar Disorder Father     Other Father         Aspiration Pneumonia    Hypertension Sister     Depression Sister     Anxiety Disorder Sister        Review of Systems:     Skin: (-) rash,(-) psoriasis,(-) eczema, (-)skin cancer. Musculoskeletal: (-) fractures,  (-) dislocations,(-) collagen vascular disease, (-) fibromyalgia, (-) multiple sclerosis, (-) muscular dystrophy, (-) RSD,(-) joint pain (-)swelling, (-) joint pain,swelling. Neurologic: (-) epilepsy, (-)seizures,(-) brain tumor,(-) TIA, (-)stroke, (-)headaches, (-)Parkinson disease,(-) memory loss, (-) LOC. Cardiovascular: (-) Chest pain, (-) swelling in legs/feet, (-) SOB, (-) cramping in legs/feet with walking. Constitutional:  The patient is alert and oriented x 3, appears to be stated age and in no distress. Temp 98 °F (36.7 °C)   Ht 5' 6\" (1.676 m)   Wt 263 lb (119.3 kg)   LMP 10/13/2016   BMI 42.45 kg/m²     Skin:  Upon inspection: the skin appears warm, dry and intact. There is not a previous scar over the affected area. There is not any cellulitis, lymphedema or cutaneous lesions noted in the lower extremities. Upon palpation there is no induration noted. Neurologic:  Gait: normal;  Motor exam of the lower extremities show ; quadriceps, hamstrings, foot dorsi and plantar flexors intact R.  5/5 and L. 5/5. Deep tendon reflexes are 2/4 at the knees and 2/4 at the ankles with strong extensor hallicus longus motor strength bilaterally. Sensory to both feet is intact to all sensory roots. Cardiovascular: The vascular exam is normal and is well perfused to distal extremities. Distal pulses DP/PT: R. 2+; L. 2+. There is cap refill noted less than two seconds in all digits. There is not edema of the bilateral lower extremities. There is not varicosities noted in the distal extremities. Lymph:  Upon palpation,  there is no lymphadenopathy noted in bilateral lower extremities. Musculoskeletal:  Gait: antalgic; examination of the nails and digits reveal no cyanosis or clubbing    Lumbar exam:  On visual inspection, there is no deformity of the spine. full range of motion, no tenderness, palpable spasm or pain on motion. Special tests: Straight Leg Raise negative, Albino testnegative. Hip exam:  Upon inspection, there is no deformity noted. Upon palpation there is not tenderness. ROM: is   full and semetrical.   Strength: Hip Flexors 5/5; Hip Abductors 5/5; Hip Adduction 5/5. Knee exam:  Right knee exam shows;  range of motion of R. Knee is 0 to 110, and L. Knee is 0 to 120. She does have  pain on motion, effusion is mild, there is tenderness over the  medial region, there are not any masses, there is not ligamentous instability, there is  deformity noted. Knee exam: right positive for moderate crepitations, some mild tenderness laxity is not noted with  stress. R. Knee:  Lachman's negative, Anterior Drawer negative, Posterior Drawer negative  Gloria's negative, Thallasy  negative,   PF grind test negative, Apprehension test negative, Patellar J sign  negative  L. Knee:  Lachman's negative, Anterior Drawer negative, Posterior Drawer negative  Gloria's negative, Thallasy  negative,   PF grind test negative, Apprehension test negative,  Patellar J sign  negative    Xrays:   n/a    MRI:   n/a  Radiographic findings reviewed with patient    Impression:  Encounter Diagnosis   Name Primary?  Primary osteoarthritis of right knee Yes       Plan:   Natural history and expected course discussed. Questions answered. Educational materials distributed. Rest, ice, compression, and elevation (RICE) therapy. Reduction in offending activity. I will proceed with a cortisone injection in the Right knee. Verbal and written consent was obtained for the injections. The skin was prepped with alcohol. 1mL of Kenalog 40mg and 9mL of 0.25% Marcaine was  injected to Right knee.  The injection was given through the lateral side of the knee. The patient tolerated the injection well. The patient has failed conservative measures such as NSAIDS, HEP, and cortisone injections. She is an excellent candidate for Zilretta injections  in the Right knee.  We will contact the patient's insurance company and see them back in the office once we have received approval.

## 2022-02-15 ENCOUNTER — TELEPHONE (OUTPATIENT)
Dept: ADMINISTRATIVE | Age: 62
End: 2022-02-15

## 2022-03-03 ENCOUNTER — OFFICE VISIT (OUTPATIENT)
Dept: PHYSICAL MEDICINE AND REHAB | Age: 62
End: 2022-03-03
Payer: COMMERCIAL

## 2022-03-03 VITALS
WEIGHT: 273 LBS | SYSTOLIC BLOOD PRESSURE: 120 MMHG | BODY MASS INDEX: 43.87 KG/M2 | DIASTOLIC BLOOD PRESSURE: 64 MMHG | HEART RATE: 71 BPM | TEMPERATURE: 97.2 F | HEIGHT: 66 IN

## 2022-03-03 DIAGNOSIS — M17.12 PRIMARY OSTEOARTHRITIS OF ONE KNEE, LEFT: ICD-10-CM

## 2022-03-03 DIAGNOSIS — G56.01 RIGHT CARPAL TUNNEL SYNDROME: Primary | ICD-10-CM

## 2022-03-03 PROCEDURE — 76942 ECHO GUIDE FOR BIOPSY: CPT | Performed by: PHYSICAL MEDICINE & REHABILITATION

## 2022-03-03 PROCEDURE — 20526 THER INJECTION CARP TUNNEL: CPT | Performed by: PHYSICAL MEDICINE & REHABILITATION

## 2022-03-03 RX ORDER — TRAMADOL HYDROCHLORIDE 50 MG/1
50 TABLET ORAL EVERY 6 HOURS PRN
Qty: 28 TABLET | Refills: 0 | Status: SHIPPED | OUTPATIENT
Start: 2022-03-03 | End: 2022-03-10

## 2022-03-03 RX ORDER — TRIAMCINOLONE ACETONIDE 40 MG/ML
40 INJECTION, SUSPENSION INTRA-ARTICULAR; INTRAMUSCULAR ONCE
Status: COMPLETED | OUTPATIENT
Start: 2022-03-03 | End: 2022-03-04

## 2022-03-03 RX ORDER — LIDOCAINE HYDROCHLORIDE 10 MG/ML
4 INJECTION, SOLUTION INFILTRATION; PERINEURAL ONCE
Status: COMPLETED | OUTPATIENT
Start: 2022-03-03 | End: 2022-03-04

## 2022-03-03 NOTE — PROGRESS NOTES
Tanmay Oseguera D.O. [facility]  [address] [facility] [address]  Phone: [phone]  Fax: [phone]    3/4/2022    Chief Complaint   Patient presents with    Wrist Pain     right carpal tunnel injection       Last injection: 7/7/21  Taking anticoagulants/antiplatelets: No  Diabetic: No  Febrile/active infection: No    Ambulatory Procedure Time Out  Correct Patient: Yes  Correct Procedure: Yes  Correct Site/Side: Yes  Correct Site(s) Marked: Yes  Informed Consent Signed: Yes  Allergies Verified: Yes  Staff Present & Credential[de-identified] Refugio Guzman, [address] [place] [address]    After explaining the indications, risks, benefits and alternatives of a Right carpal tunnel injection, the patient agreed to proceed. Permit wwas signed and scanned into the media. The patient was placed in the seated position. The skin on the volar wrist was prepared with chloraprep. Ethyl Chloride vapocoolant spray was used for local anesthesia. Using an aseptic, no touch technique, a 25 gauge, 5/8\" needle with 2 cc of Xylocaine 1% and 1 cc of Kenalog 40 mg/cc was directed into the carpal tunnel using ultrasound guidance  After negative aspiration, the medication was injected. Adequate hemostasis was obtained and a bandage applied to the injection site. The patient tolerated the procedure well and was educated in post injection care. There was post injection reduction in pain. The images are uploaded separately in the EMR. Tanmay Oseguera D.O., P.T.   Board Certified Physical Medicine and Rehabilitation  Board Certified Electrodiagnostic Medicine    Administrations This Visit     lidocaine 1 % injection 4 mL     Admin Date  03/04/2022  08:03 Action  Given Dose  4 mL Route  Other Site   Administered By  Rosamaria Miles LPN    Ordering Provider: DO Corin [address]: [id number]    Lot#: [id number]    : [facility]OS[facility]IRA    Patient Supplied?: No          triamcinolone acetonide (KENALOG-40) injection 40 mg     Admin Date  03/04/2022  08:04 Action  Given Dose  40 mg Route  Intra-artICUlar Site   Administered By  Brent Veronica LPN    Ordering Provider: Hoa Spear DO    NDC: 3101-9939-39    Lot#:  [id number]    : [employer] (PRIMARY CARE)    Patient Supplied?: No

## 2022-03-04 RX ADMIN — TRIAMCINOLONE ACETONIDE 40 MG: 40 INJECTION, SUSPENSION INTRA-ARTICULAR; INTRAMUSCULAR at 08:04

## 2022-03-04 RX ADMIN — LIDOCAINE HYDROCHLORIDE 4 ML: 10 INJECTION, SOLUTION INFILTRATION; PERINEURAL at 08:03

## 2022-03-10 ENCOUNTER — OFFICE VISIT (OUTPATIENT)
Dept: PHYSICAL MEDICINE AND REHAB | Age: 62
End: 2022-03-10
Payer: COMMERCIAL

## 2022-03-10 VITALS
WEIGHT: 273 LBS | SYSTOLIC BLOOD PRESSURE: 135 MMHG | HEART RATE: 50 BPM | BODY MASS INDEX: 43.87 KG/M2 | HEIGHT: 66 IN | TEMPERATURE: 97 F | DIASTOLIC BLOOD PRESSURE: 74 MMHG

## 2022-03-10 DIAGNOSIS — M47.816 FACET SYNDROME, LUMBAR: ICD-10-CM

## 2022-03-10 DIAGNOSIS — M54.12 CERVICAL RADICULOPATHY: Primary | ICD-10-CM

## 2022-03-10 DIAGNOSIS — G56.03 BILATERAL CARPAL TUNNEL SYNDROME: ICD-10-CM

## 2022-03-10 PROCEDURE — 76942 ECHO GUIDE FOR BIOPSY: CPT | Performed by: PHYSICAL MEDICINE & REHABILITATION

## 2022-03-10 PROCEDURE — 20526 THER INJECTION CARP TUNNEL: CPT | Performed by: PHYSICAL MEDICINE & REHABILITATION

## 2022-03-10 RX ORDER — TRIAMCINOLONE ACETONIDE 40 MG/ML
40 INJECTION, SUSPENSION INTRA-ARTICULAR; INTRAMUSCULAR ONCE
Status: COMPLETED | OUTPATIENT
Start: 2022-03-10 | End: 2022-03-10

## 2022-03-10 RX ORDER — LIDOCAINE HYDROCHLORIDE 10 MG/ML
4 INJECTION, SOLUTION INFILTRATION; PERINEURAL ONCE
Status: COMPLETED | OUTPATIENT
Start: 2022-03-10 | End: 2022-03-10

## 2022-03-10 RX ADMIN — TRIAMCINOLONE ACETONIDE 40 MG: 40 INJECTION, SUSPENSION INTRA-ARTICULAR; INTRAMUSCULAR at 10:28

## 2022-03-10 RX ADMIN — LIDOCAINE HYDROCHLORIDE 4 ML: 10 INJECTION, SOLUTION INFILTRATION; PERINEURAL at 10:27

## 2022-03-10 NOTE — PROGRESS NOTES
Cesario Branham D.O. [facility]  [address] [place]  Phone: [phone]  Fax: [phone]    3/10/2022    Chief Complaint   Patient presents with    Wrist Pain     Left carpal tunnel injection       Last injection: 6/29/21  Taking anticoagulants/antiplatelets: No  Diabetic: No  Febrile/active infection: No    Ambulatory Procedure Time Out  Correct Patient: Yes  Correct Procedure: Yes  Correct Site/Side: Yes  Correct Site(s) Marked: Yes  Informed Consent Signed: Yes  Allergies Verified: Yes  Staff Present & Credential[de-identified] Cruz Chiu LPN, Dr. Faiza Valentin DO    After explaining the indications, risks, benefits and alternatives of a Right carpal tunnel injection, the patient agreed to proceed. Permit wwas signed and scanned into the media. The patient was placed in the seated position. The skin on the volar wrist was prepared with chloraprep. Ethyl Chloride vapocoolant spray was used for local anesthesia. Using an aseptic, no touch technique, a 25 gauge, 5/8\" needle with 2 cc of Xylocaine 1% and 1 cc of Kenalog 40 mg/cc was directed into the carpal tunnel using ultrasound guidance  After negative aspiration, the medication was injected. Adequate hemostasis was obtained and a bandage applied to the injection site. The patient tolerated the procedure well and was educated in post injection care. There was post injection reduction in pain. The images are uploaded separately in the EMR. Patient last had facet corticosteroid injection in December 2020 with excellent results. Pain recently started to recur and she would like to repeat the injection. She is aware Dr. Marybeth Villafana is on YARI and will wait for her to return to office. Orders placed today to initiate the authorization process,.    Orders Placed This Encounter   Procedures    US GUIDED NEEDLE PLACEMENT     Standing Status:   Future     Standing Expiration Date:   3/10/2023    ND INJECT CARPAL TUNNEL    [place] INJ DX/THER AGNT PARAVERT FACET JOINT, LUMBAR/SAC, 1ST LEVEL     Bilateral intra-articular facet joint injection with fluoroscopic guidance at L3-4, L4-5,L5-S1 by Dr. De Cardona D.O., P.T. Board Certified Physical Medicine and Rehabilitation  Board Certified Electrodiagnostic Medicine    Administrations This Visit     lidocaine 1 % injection 4 mL     Admin Date  03/10/2022  10:27 Action  Given Dose  4 mL Route  Other Site   Administered By  Zak Parkinson LPN    Ordering Provider: Verenice Sinclair DO    [address]: [id number]    Lot#: [id number]    : [facility]IRA    Patient Supplied?: No          triamcinolone acetonide (KENALOG-40) injection 40 mg     Admin Date  03/10/2022  10:28 Action  Given Dose  40 mg Route  Intra-artICUlar Site   Administered By  Zak Parkinson LPN    Ordering Provider: Verenice Sinclair DO    NDC: 6363-3527-81    Lot#:  [id number]    : [employer] (PRIMARY CARE)    Patient Supplied?: No

## 2022-03-17 ENCOUNTER — TELEPHONE (OUTPATIENT)
Dept: PHYSICAL MEDICINE AND REHAB | Age: 62
End: 2022-03-17

## 2022-03-17 NOTE — TELEPHONE ENCOUNTER
Called and left message for patient for follow up post left carpal tunnel injection will await call back.

## 2022-04-13 DIAGNOSIS — Z96.652 S/P TOTAL KNEE ARTHROPLASTY, LEFT: Primary | ICD-10-CM

## 2022-04-14 ENCOUNTER — OFFICE VISIT (OUTPATIENT)
Dept: ORTHOPEDIC SURGERY | Age: 62
End: 2022-04-14
Payer: COMMERCIAL

## 2022-04-14 VITALS — WEIGHT: 273 LBS | TEMPERATURE: 98 F | HEIGHT: 66 IN | BODY MASS INDEX: 43.87 KG/M2

## 2022-04-14 DIAGNOSIS — Z96.652 S/P TOTAL KNEE ARTHROPLASTY, LEFT: Primary | ICD-10-CM

## 2022-04-14 DIAGNOSIS — M17.11 PRIMARY OSTEOARTHRITIS OF RIGHT KNEE: ICD-10-CM

## 2022-04-14 PROCEDURE — 20610 DRAIN/INJ JOINT/BURSA W/O US: CPT | Performed by: ORTHOPAEDIC SURGERY

## 2022-04-14 PROCEDURE — 99213 OFFICE O/P EST LOW 20 MIN: CPT | Performed by: ORTHOPAEDIC SURGERY

## 2022-04-14 RX ORDER — TRIAMCINOLONE ACETONIDE 40 MG/ML
40 INJECTION, SUSPENSION INTRA-ARTICULAR; INTRAMUSCULAR ONCE
Status: COMPLETED | OUTPATIENT
Start: 2022-04-14 | End: 2022-04-14

## 2022-04-14 RX ADMIN — TRIAMCINOLONE ACETONIDE 40 MG: 40 INJECTION, SUSPENSION INTRA-ARTICULAR; INTRAMUSCULAR at 10:00

## 2022-04-14 NOTE — PROGRESS NOTES
Chief Complaint   Patient presents with    Knee Pain     Left TKA DOS , states of a clicking sensation, had Right Knee Cortisone injection on 2022 with good relief. Michael Cronin returns today for follow-up of her left knee TKA 21. Patient notes clicking and popping but no pain.       Past Medical History:   Diagnosis Date    Alopecia areata     Anxiety     Depression     Hyperlipidemia     Nausea & vomiting     Obesity, Class III, BMI 40-49.9 (morbid obesity) (HCC)     Osteoarthritis     PVC (premature ventricular contraction)     Sleep apnea     CPAP    Thyroid disease      Past Surgical History:   Procedure Laterality Date     SECTION      x2    CHOLECYSTECTOMY      COLONOSCOPY  14    bx done Dr Modi Mis  2011         KNEE ARTHROSCOPY      NERVE BLOCK Bilateral 3/5/14    lumbar facet #1    NERVE BLOCK  14    paravertebral facet bilateral lumbar #2    NERVE BLOCK N/A 14    cerv #1    NERVE BLOCK N/A 2014    cervical epidural  #2    NERVE BLOCK N/A 14    cerv #3    NERVE BLOCK Bilateral 2020    intra-articular facet joint injection     OTHER SURGICAL HISTORY  14    Radiofrequency right lumbar L3-4, L4-5, L5-S1    OTHER SURGICAL HISTORY Left 2014    lumbar radiofrequency    PAIN MANAGEMENT PROCEDURE Bilateral 2020    BILATERAL INTRA-ARTICULAR FACET JOINT INJECTION WITH FLUOROSCOPIC GUIDANCE AT L3-4, L4-5, L5-S1 (CPT V2940221, [id number]) [facility]    LEFT KNEE TOTAL KNEE ARTHROPLASTY [facility] NEP[facility]W) performed by Vashti Vaughn DO at [facility]  14    ashly ,bx sm bowel  Dr Sahra Núñez       Current Outpatient Medications:     diclofenac (VOLTAREN) 50 MG EC tablet, TAKE 1 TABLET BY MOUTH 2 TIMES A DAY WITH MEALS, Disp: 60 tablet, Rfl: 3    famotidine (PEPCID) 20 MG tablet, Take 1 tablet by mouth 2 times daily, Disp: 180 tablet, Rfl: 1    traZODone (DESYREL) 150 MG tablet, TAKE ONE TABLET BY MOUTH NIGHTLY, Disp: 90 tablet, Rfl: 1    simvastatin (ZOCOR) 20 MG tablet, TAKE ONE TABLET BY MOUTH NIGHTLY, Disp: 90 tablet, Rfl: 1    diclofenac 1 % CREA, Apply 2 g topically 4 times daily To affected area, Disp: 120 g, Rfl: 3    sertraline (ZOLOFT) 100 MG tablet, Take 1 tablet by mouth daily Indications: takes 2 tablets at hs Take 2 tablet at hs po, Disp: 180 tablet, Rfl: 3    atenolol (TENORMIN) 50 MG tablet, Take 1 tablet by mouth nightly, Disp: 90 tablet, Rfl: 2    levothyroxine (SYNTHROID) 50 MCG tablet, Take 1 tablet by mouth Daily, Disp: 90 tablet, Rfl: 2    melatonin 3 MG TABS tablet, Take 1-3 tablets nightly as needed for insomnia., Disp: 90 tablet, Rfl: 0    tiZANidine (ZANAFLEX) 4 MG tablet, Take 1 tablet by mouth every 8 hours as needed (MUSCLE SPASMS), Disp: 90 tablet, Rfl: 1    ondansetron (ZOFRAN) 4 MG tablet, Take 1 tablet by mouth 3 times daily as needed for Nausea or Vomiting, Disp: 15 tablet, Rfl: 0    naproxen (NAPROSYN) 500 MG tablet, Take 1 tablet by mouth 2 times daily (with meals), Disp: 180 tablet, Rfl: 1    Cholecalciferol (VITAMIN D-3) 5000 UNITS TABS, Take  by mouth., Disp: , Rfl:   Allergies   Allergen Reactions    Mobic [Meloxicam] Itching     Burning/Itching of there scalp, palms, back and feet.      Morphine Sulfate Itching    Percocet [Oxycodone-Acetaminophen] Itching    Vicodin [Hydrocodone-Acetaminophen] Itching     Social History     Socioeconomic History    Marital status:      Spouse name: Ambrosio    Number of children: 2    Years of education: 15    Highest education level: Not on file   Occupational History    Occupation: LPN   Tobacco Use    Smoking status: Never Smoker    Smokeless tobacco: Never Used   Vaping Use    Vaping Use: Never used   Substance and Sexual Activity    Alcohol use: Yes     Comment: occasionally    Drug use: No    Sexual activity: Yes     Partners: Male   Other Topics Concern    Not on file   Social History Narrative    Patient lives at home with . She is independent with ADL's, and does require the use of an assistive device. Social Determinants of Health     Financial Resource Strain: Low Risk     Difficulty of Paying Living Expenses: Not hard at all   Food Insecurity: No Food Insecurity    Worried About Running Out of Food in the Last Year: Never true    Cristian of Food in the Last Year: Never true   Transportation Needs:     Lack of Transportation (Medical): Not on file    Lack of Transportation (Non-Medical):  Not on file   Physical Activity:     Days of Exercise per Week: Not on file    Minutes of Exercise per Session: Not on file   Stress:     Feeling of Stress : Not on file   Social Connections:     Frequency of Communication with Friends and Family: Not on file    Frequency of Social Gatherings with Friends and Family: Not on file    Attends [employer] Services: Not on file    Active Member of [address] [place] [address] or Organizations: Not on file    Attends Club or Organization Meetings: Not on file    Marital Status: Not on file   Intimate Partner Violence:     Fear of Current or Ex-Partner: Not on file    Emotionally Abused: Not on file    Physically Abused: Not on file    Sexually Abused: Not on file   Housing Stability:     Unable to Pay for Housing in the Last Year: Not on file    Number of [place] in the Last Year: Not on file    Unstable Housing in the Last Year: Not on file     Family History   Problem Relation Age of Onset    Heart Disease Brother         MI with stents    Other Brother         Frontal lobe lesion    Heart Disease Maternal Grandfather     Heart Disease Paternal Grandfather     Cancer Other     Depression Other     Mental Illness Other     Cancer Mother         breast with bone and liver mets    Bipolar Disorder Father     Other Father         Aspiration Pneumonia    Hypertension Sister     Depression Sister     Anxiety Disorder Sister        Review of Systems:     Skin: (-) rash,(-) psoriasis,(-) eczema, (-)skin cancer. Musculoskeletal: (-) fractures,  (-) dislocations,(-) collagen vascular disease, (-) fibromyalgia, (-) multiple sclerosis, (-) muscular dystrophy, (-) RSD,(-) joint pain (-)swelling, (-) joint pain,swelling. Neurologic: (-) epilepsy, (-)seizures,(-) brain tumor,(-) TIA, (-)stroke, (-)headaches, (-)Parkinson disease,(-) memory loss, (-) LOC. Cardiovascular: (-) Chest pain, (-) swelling in legs/feet, (-) SOB, (-) cramping in legs/feet with walking. Constitutional:  The patient is alert and oriented x 3, appears to be stated age and in no distress. Temp 98 °F (36.7 °C)   Ht 5' 6\" (1.676 m)   Wt 273 lb (123.8 kg)   LMP 10/13/2016   BMI 44.06 kg/m²     Skin:  Upon inspection: the skin appears warm, dry and intact. There is not a previous scar over the affected area. There is not any cellulitis, lymphedema or cutaneous lesions noted in the lower extremities. Upon palpation there is no induration noted. Neurologic:  Gait: normal;  Motor exam of the lower extremities show ; quadriceps, hamstrings, foot dorsi and plantar flexors intact R.  5/5 and L. 5/5. Deep tendon reflexes are 2/4 at the knees and 2/4 at the ankles with strong extensor hallicus longus motor strength bilaterally. Sensory to both feet is intact to all sensory roots. Cardiovascular: The vascular exam is normal and is well perfused to distal extremities. Distal pulses DP/PT: R. 2+; L. 2+. There is cap refill noted less than two seconds in all digits. There is not edema of the bilateral lower extremities. There is not varicosities noted in the distal extremities. Lymph:  Upon palpation,  there is no lymphadenopathy noted in bilateral lower extremities.       Musculoskeletal:  Gait: normal; examination of the nails and digits reveal no cyanosis or clubbing    Lumbar exam:  On visual inspection, there is no deformity of the spine. full range of motion, no tenderness, palpable spasm or pain on motion. Special tests: Straight Leg Raise negative, Albino testnegative. Hip exam:  Upon inspection, there is no deformity noted. Upon palpation there is not tenderness. ROM: is   full and semetrical.   Strength: Hip Flexors 5/5; Hip Abductors 5/5; Hip Adduction 5/5. Knee exam:  Left knee exam shows;  range of motion of R. Knee is 0 to 115, and L. Knee is 0 to 120. She does have  pain on motion, effusion is mild, there is not tenderness over the  global region, there are not any masses, there is not ligamentous instability, there is not  deformity noted. Knee exam: right positive for moderate crepitations, some mild tenderness laxity is not noted with  stress. R. Knee:  Lachman's negative, Anterior Drawer negative, Posterior Drawer negative  Gloria's negative, Thallasy  negative,   PF grind test negative, Apprehension test negative, Patellar J sign  negative  L. Knee:  Lachman's negative, Anterior Drawer negative, Posterior Drawer negative  Gloria's negative, Thallasy  negative,   PF grind test negative, Apprehension test negative,  Patellar J sign  negative    Xrays:   S/p knee arthroplasty with no signs of loosening    MRI:    n/a  Radiographic findings reviewed with patient    Impression:  Encounter Diagnoses   Name Primary?  S/P total knee arthroplasty, left Yes    Primary osteoarthritis of right knee        Plan:   Natural history and expected course discussed. Questions answered. Educational materials distributed. Rest, ice, compression, and elevation (RICE) therapy. Reduction in offending activity. Continue with activities as tolerated. Antibiotics prior to dental and endoscopies. Right knee cortisone injection   I will proceed with a cortisone injection in the Right knee.   Verbal and written consent was obtained for the injections. The skin was prepped with alcohol. 1mL of Kenalog 40mg and 9mL of 0.25% Marcaine was  injected to Right knee. The injection was given through the lateral side of the knee. The patient tolerated the injection well. I will see the patient back as needed.

## 2022-04-18 ENCOUNTER — TELEPHONE (OUTPATIENT)
Dept: PHYSICAL MEDICINE AND REHAB | Age: 62
End: 2022-04-18

## 2022-06-14 DIAGNOSIS — E78.2 MIXED HYPERLIPIDEMIA: ICD-10-CM

## 2022-06-14 DIAGNOSIS — E07.9 THYROID DISEASE: ICD-10-CM

## 2022-06-14 DIAGNOSIS — E55.9 VITAMIN D DEFICIENCY: ICD-10-CM

## 2022-06-14 DIAGNOSIS — N39.0 URINARY TRACT INFECTION WITHOUT HEMATURIA, SITE UNSPECIFIED: ICD-10-CM

## 2022-06-14 DIAGNOSIS — R73.03 PREDIABETES: ICD-10-CM

## 2022-06-14 DIAGNOSIS — I49.3 PVC (PREMATURE VENTRICULAR CONTRACTION): ICD-10-CM

## 2022-06-14 LAB
ALBUMIN SERPL-MCNC: 4.5 G/DL (ref 3.5–5.2)
ALP BLD-CCNC: 67 U/L (ref 35–104)
ALT SERPL-CCNC: 16 U/L (ref 0–32)
ANION GAP SERPL CALCULATED.3IONS-SCNC: 15 MMOL/L (ref 7–16)
AST SERPL-CCNC: 14 U/L (ref 0–31)
BACTERIA: ABNORMAL /HPF
BASOPHILS ABSOLUTE: 0.09 E9/L (ref 0–0.2)
BASOPHILS RELATIVE PERCENT: 1.2 % (ref 0–2)
BILIRUB SERPL-MCNC: 0.2 MG/DL (ref 0–1.2)
BILIRUBIN URINE: NEGATIVE
BLOOD, URINE: NEGATIVE
BUN BLDV-MCNC: 17 MG/DL (ref 6–23)
CALCIUM SERPL-MCNC: 9.2 MG/DL (ref 8.6–10.2)
CHLORIDE BLD-SCNC: 102 MMOL/L (ref 98–107)
CHOLESTEROL, TOTAL: 188 MG/DL (ref 0–199)
CLARITY: NORMAL
CO2: 22 MMOL/L (ref 22–29)
COLOR: YELLOW
CREAT SERPL-MCNC: 0.9 MG/DL (ref 0.5–1)
CREATININE URINE: 157 MG/DL (ref 29–226)
EOSINOPHILS ABSOLUTE: 0.11 E9/L (ref 0.05–0.5)
EOSINOPHILS RELATIVE PERCENT: 1.4 % (ref 0–6)
GFR AFRICAN AMERICAN: >60
GFR NON-AFRICAN AMERICAN: >60 ML/MIN/1.73
GLUCOSE BLD-MCNC: 108 MG/DL (ref 74–99)
GLUCOSE URINE: NEGATIVE MG/DL
HBA1C MFR BLD: 5.8 % (ref 4–5.6)
HCT VFR BLD CALC: 41.6 % (ref 34–48)
HDLC SERPL-MCNC: 45 MG/DL
HEMOGLOBIN: 12.7 G/DL (ref 11.5–15.5)
IMMATURE GRANULOCYTES #: 0.03 E9/L
IMMATURE GRANULOCYTES %: 0.4 % (ref 0–5)
KETONES, URINE: NEGATIVE MG/DL
LDL CHOLESTEROL CALCULATED: 111 MG/DL (ref 0–99)
LEUKOCYTE ESTERASE, URINE: NEGATIVE
LYMPHOCYTES ABSOLUTE: 2.45 E9/L (ref 1.5–4)
LYMPHOCYTES RELATIVE PERCENT: 32.2 % (ref 20–42)
MCH RBC QN AUTO: 30.6 PG (ref 26–35)
MCHC RBC AUTO-ENTMCNC: 30.5 % (ref 32–34.5)
MCV RBC AUTO: 100.2 FL (ref 80–99.9)
MICROALBUMIN UR-MCNC: <12 MG/L
MICROALBUMIN/CREAT UR-RTO: ABNORMAL (ref 0–30)
MONOCYTES ABSOLUTE: 0.55 E9/L (ref 0.1–0.95)
MONOCYTES RELATIVE PERCENT: 7.2 % (ref 2–12)
NEUTROPHILS ABSOLUTE: 4.39 E9/L (ref 1.8–7.3)
NEUTROPHILS RELATIVE PERCENT: 57.6 % (ref 43–80)
NITRITE, URINE: NEGATIVE
PDW BLD-RTO: 13.2 FL (ref 11.5–15)
PH UA: 6 (ref 5–9)
PLATELET # BLD: 260 E9/L (ref 130–450)
PMV BLD AUTO: 11.1 FL (ref 7–12)
POTASSIUM SERPL-SCNC: 4.6 MMOL/L (ref 3.5–5)
PROTEIN UA: NEGATIVE MG/DL
RBC # BLD: 4.15 E12/L (ref 3.5–5.5)
RBC UA: ABNORMAL /HPF (ref 0–2)
SODIUM BLD-SCNC: 139 MMOL/L (ref 132–146)
SPECIFIC GRAVITY UA: 1.01 (ref 1–1.03)
T4 FREE: 1.05 NG/DL (ref 0.93–1.7)
TOTAL PROTEIN: 6.8 G/DL (ref 6.4–8.3)
TRIGL SERPL-MCNC: 161 MG/DL (ref 0–149)
TSH SERPL DL<=0.05 MIU/L-ACNC: 1.24 UIU/ML (ref 0.27–4.2)
UROBILINOGEN, URINE: 0.2 E.U./DL
VITAMIN D 25-HYDROXY: 37 NG/ML (ref 30–100)
VLDLC SERPL CALC-MCNC: 32 MG/DL
WBC # BLD: 7.6 E9/L (ref 4.5–11.5)
WBC UA: ABNORMAL /HPF (ref 0–5)

## 2022-07-14 ENCOUNTER — OFFICE VISIT (OUTPATIENT)
Dept: ORTHOPEDIC SURGERY | Age: 62
End: 2022-07-14
Payer: COMMERCIAL

## 2022-07-14 VITALS — BODY MASS INDEX: 42.43 KG/M2 | TEMPERATURE: 98 F | WEIGHT: 264 LBS | HEIGHT: 66 IN

## 2022-07-14 DIAGNOSIS — M17.11 PRIMARY OSTEOARTHRITIS OF RIGHT KNEE: Primary | ICD-10-CM

## 2022-07-14 PROCEDURE — 20610 DRAIN/INJ JOINT/BURSA W/O US: CPT | Performed by: NURSE PRACTITIONER

## 2022-07-14 PROCEDURE — 99213 OFFICE O/P EST LOW 20 MIN: CPT | Performed by: NURSE PRACTITIONER

## 2022-07-14 RX ORDER — BETAMETHASONE SODIUM PHOSPHATE AND BETAMETHASONE ACETATE 3; 3 MG/ML; MG/ML
6 INJECTION, SUSPENSION INTRA-ARTICULAR; INTRALESIONAL; INTRAMUSCULAR; SOFT TISSUE ONCE
Status: DISCONTINUED | OUTPATIENT
Start: 2022-07-14 | End: 2022-07-15

## 2022-07-14 NOTE — PROGRESS NOTES
Chief Complaint   Patient presents with    Knee Pain     right knee pain f/u wants cortisone injection       Kristin Merlin returns today for follow-up of her right knee pain. She had csi 22 with good relief but it was short term    Past Medical History:   Diagnosis Date    Alopecia areata     Anxiety     Depression     Hyperlipidemia     Nausea & vomiting     Obesity, Class III, BMI 40-49.9 (morbid obesity) (HCC)     Osteoarthritis     PVC (premature ventricular contraction)     Sleep apnea     CPAP    Thyroid disease      Past Surgical History:   Procedure Laterality Date     SECTION      x2    CHOLECYSTECTOMY      COLONOSCOPY  14    bx done Dr Joi Linares  2011         KNEE ARTHROSCOPY      NERVE BLOCK Bilateral 3/5/14    lumbar facet #1    NERVE BLOCK  14    paravertebral facet bilateral lumbar #2    NERVE BLOCK N/A 14    cerv #1    NERVE BLOCK N/A 2014    cervical epidural  #2    NERVE BLOCK N/A 14    cerv #3    NERVE BLOCK Bilateral 2020    intra-articular facet joint injection     OTHER SURGICAL HISTORY  14    Radiofrequency right lumbar L3-4, L4-5, L5-S1    OTHER SURGICAL HISTORY Left 2014    lumbar radiofrequency    PAIN MANAGEMENT PROCEDURE Bilateral 2020    BILATERAL INTRA-ARTICULAR FACET JOINT INJECTION WITH FLUOROSCOPIC GUIDANCE AT L3-4, L4-5, L5-S1 (CPT [id number], [id number]) performed by Schetrev-PlDO tiffanie at [facility] Left 2021    LEFT KNEE TOTAL KNEE ARTHROPLASTY [facility]) performed by Ellyn Waller DO at [facility]  14    ashly ,bx sm bowel  Dr Romulo Gil       Allergies   Allergen Reactions    Mobic [Meloxicam] Itching     Burning/Itching of there scalp, palms, back and feet.      Morphine Sulfate Itching    Percocet [Oxycodone-Acetaminophen] Itching    Vicodin [Hydrocodone-Acetaminophen] Itching     Social History Socioeconomic History    Marital status:      Spouse name: Ambrosio    Number of children: 2    Years of education: 14    Highest education level: Not on file   Occupational History    Occupation: LPN   Tobacco Use    Smoking status: Never Smoker    Smokeless tobacco: Never Used   Vaping Use    Vaping Use: Never used   Substance and Sexual Activity    Alcohol use: Yes     Comment: occasionally    Drug use: No    Sexual activity: Yes     Partners: Male   Other Topics Concern    Not on file   Social History Narrative    Patient lives at home with . She is independent with ADL's, and does require the use of an assistive device. Social Determinants of Health     Financial Resource Strain: Low Risk     Difficulty of Paying Living Expenses: Not hard at all   Food Insecurity: No Food Insecurity    Worried About [address] in the Last Year: Never true    [address] [employer] in the Last Year: Never true   Transportation Needs:     Lack of Transportation (Medical): Not on file    Lack of Transportation (Non-Medical):  Not on file   Physical Activity:     Days of Exercise per Week: Not on file    Minutes of Exercise per Session: Not on file   Stress:     Feeling of Stress : Not on file   Social Connections:     Frequency of Communication with Friends and Family: Not on file    Frequency of Social Gatherings with Friends and Family: Not on file    Attends [employer] Services: Not on file    Active Member of Clubs or Organizations: Not on file    Attends Club or Organization Meetings: Not on file    Marital Status: Not on file   Intimate Partner Violence:     Fear of Current or Ex-Partner: Not on file    Emotionally Abused: Not on file    Physically Abused: Not on file    Sexually Abused: Not on file   Housing Stability:     Unable to Pay for Housing in the Last Year: Not on file    Number of [place] in the Last Year: Not on file    Unstable Housing in the Last Year: Not on file     Family History Problem Relation Age of Onset    Heart Disease Brother         MI with stents    Other Brother         Frontal lobe lesion    Heart Disease Maternal Grandfather     Heart Disease Paternal Grandfather     Cancer Other     Depression Other     Mental Illness Other     Cancer Mother         breast with bone and liver mets    Bipolar Disorder Father     Other Father         Aspiration Pneumonia    Hypertension Sister     Depression Sister     Anxiety Disorder Sister        Review of Systems:     Skin: (-) rash,(-) psoriasis,(-) eczema, (-)skin cancer. Musculoskeletal: (-) fractures,  (-) dislocations,(-) collagen vascular disease, (-) fibromyalgia, (-) multiple sclerosis, (-) muscular dystrophy, (-) RSD,(-) joint pain (-)swelling, (-) joint pain,swelling. Neurologic: (-) epilepsy, (-)seizures,(-) brain tumor,(-) TIA, (-)stroke, (-)headaches, (-)Parkinson disease,(-) memory loss, (-) LOC. Cardiovascular: (-) Chest pain, (-) swelling in legs/feet, (-) SOB, (-) cramping in legs/feet with walking. Constitutional:  The patient is alert and oriented x 3, appears to be stated age and in no distress. Temp 98 °F (36.7 °C)   Ht 5' 6\" (1.676 m)   Wt 264 lb (119.7 kg)   LMP 10/13/2016   BMI 42.61 kg/m²     Skin:  Upon inspection: the skin appears warm, dry and intact. There is not a previous scar over the affected area. There is not any cellulitis, lymphedema or cutaneous lesions noted in the lower extremities. Upon palpation there is no induration noted. Neurologic:  Gait: normal;  Motor exam of the lower extremities show ; quadriceps, hamstrings, foot dorsi and plantar flexors intact R.  5/5 and L. 5/5. Deep tendon reflexes are 2/4 at the knees and 2/4 at the ankles with strong extensor hallicus longus motor strength bilaterally. Sensory to both feet is intact to all sensory roots. Cardiovascular: The vascular exam is normal and is well perfused to distal extremities.   Distal pulses DP/PT: R. 2+; L. 2+. There is cap refill noted less than two seconds in all digits. There is not edema of the bilateral lower extremities. There is not varicosities noted in the distal extremities. Lymph:  Upon palpation,  there is no lymphadenopathy noted in bilateral lower extremities. Musculoskeletal:  Gait: normal; examination of the nails and digits reveal no cyanosis or clubbing    Lumbar exam:  On visual inspection, there is no deformity of the spine. full range of motion, no tenderness, palpable spasm or pain on motion. Special tests: Straight Leg Raise negative, Albino testnegative. Hip exam:  Upon inspection, there is no deformity noted. Upon palpation there is not tenderness. ROM: is   full and semetrical.   Strength: Hip Flexors 5/5; Hip Abductors 5/5; Hip Adduction 5/5. Knee exam:  Left knee exam shows;  range of motion of R. Knee is 0 to 115, and L. Knee is 0 to 120. She does have  pain on motion, effusion is mild, there is not tenderness over the  global region, there are not any masses, there is not ligamentous instability, there is not  deformity noted. Knee exam: right positive for moderate crepitations, some mild tenderness laxity is not noted with  stress. R. Knee:  Lachman's negative, Anterior Drawer negative, Posterior Drawer negative  Gloria's negative, Thallasy  negative,   PF grind test negative, Apprehension test negative, Patellar J sign  negative  L. Knee:  Lachman's negative, Anterior Drawer negative, Posterior Drawer negative  Gloria's negative, Thallasy  negative,   PF grind test negative, Apprehension test negative,  Patellar J sign  negative    Xrays:   S/p knee arthroplasty with no signs of loosening    MRI:    n/a  Radiographic findings reviewed with patient    Impression:  Encounter Diagnosis   Name Primary? Primary osteoarthritis of right knee Yes       Plan:   Natural history and expected course discussed. Questions answered.   Educational materials distributed. Rest, ice, compression, and elevation (RICE) therapy. Reduction in offending activity. I had a lengthy discussion with the patient regarding their diagnosis. I explained treatment options including surgical vs non surgical treatment. I reviewed in detail the risks and benefits and outlined the procedure in detail with expected outcomes and possible complications. I also discussed non surgical treatment such as injections (CSI and visco supplementation), physical therapy, topical creams and NSAID's. They have elected for conservative management at this time. We will use sample zilretta for her today     I will proceed with a cortisone injection in the Right knee. Verbal and written consent was obtained for the injections. The skin was prepped with alcohol. A prepared mixture of 32 mg of Zilretta and 5mL diluent was injected to Right knee. The injection was given through the lateral side of the knee. The patient tolerated the injection well. I will see the patient back prn.

## 2022-09-28 DIAGNOSIS — E78.2 MIXED HYPERLIPIDEMIA: ICD-10-CM

## 2022-09-28 LAB
CHOLESTEROL, TOTAL: 171 MG/DL (ref 0–199)
HDLC SERPL-MCNC: 47 MG/DL
LDL CHOLESTEROL CALCULATED: 91 MG/DL (ref 0–99)
TRIGL SERPL-MCNC: 163 MG/DL (ref 0–149)
VLDLC SERPL CALC-MCNC: 33 MG/DL

## 2022-10-17 DIAGNOSIS — N64.59 ABNORMAL BREAST FINDING: Primary | ICD-10-CM

## 2022-11-07 DIAGNOSIS — N61.1 LEFT BREAST ABSCESS: Primary | ICD-10-CM

## 2022-11-10 ENCOUNTER — HOSPITAL ENCOUNTER (OUTPATIENT)
Dept: GENERAL RADIOLOGY | Age: 62
Discharge: HOME | End: 2022-11-12
Payer: COMMERCIAL

## 2022-11-10 ENCOUNTER — OFFICE VISIT (OUTPATIENT)
Dept: ORTHOPEDIC SURGERY | Age: 62
End: 2022-11-10
Payer: COMMERCIAL

## 2022-11-10 ENCOUNTER — OFFICE VISIT (OUTPATIENT)
Dept: BREAST CENTER | Age: 62
End: 2022-11-10
Payer: COMMERCIAL

## 2022-11-10 VITALS — BODY MASS INDEX: 42.43 KG/M2 | TEMPERATURE: 98 F | HEIGHT: 66 IN | WEIGHT: 264 LBS

## 2022-11-10 VITALS
RESPIRATION RATE: 14 BRPM | BODY MASS INDEX: 43.07 KG/M2 | WEIGHT: 268 LBS | DIASTOLIC BLOOD PRESSURE: 86 MMHG | SYSTOLIC BLOOD PRESSURE: 138 MMHG | HEART RATE: 55 BPM | HEIGHT: 66 IN | OXYGEN SATURATION: 95 %

## 2022-11-10 DIAGNOSIS — N60.82 SEBACEOUS CYST OF BREAST, LEFT: ICD-10-CM

## 2022-11-10 DIAGNOSIS — N64.59 ABNORMAL BREAST FINDING: ICD-10-CM

## 2022-11-10 DIAGNOSIS — M17.12 PRIMARY OSTEOARTHRITIS OF ONE KNEE, LEFT: Primary | ICD-10-CM

## 2022-11-10 DIAGNOSIS — Z91.89 AT HIGH RISK FOR BREAST CANCER: Primary | ICD-10-CM

## 2022-11-10 DIAGNOSIS — N61.1 LEFT BREAST ABSCESS: Primary | ICD-10-CM

## 2022-11-10 DIAGNOSIS — N61.1 LEFT BREAST ABSCESS: ICD-10-CM

## 2022-11-10 PROCEDURE — 76642 ULTRASOUND BREAST LIMITED: CPT

## 2022-11-10 PROCEDURE — 99203 OFFICE O/P NEW LOW 30 MIN: CPT | Performed by: SURGERY

## 2022-11-10 PROCEDURE — 99204 OFFICE O/P NEW MOD 45 MIN: CPT | Performed by: SURGERY

## 2022-11-10 PROCEDURE — 11402 EXC TR-EXT B9+MARG 1.1-2 CM: CPT | Performed by: SURGERY

## 2022-11-10 PROCEDURE — 20610 DRAIN/INJ JOINT/BURSA W/O US: CPT | Performed by: NURSE PRACTITIONER

## 2022-11-10 PROCEDURE — 77066 DX MAMMO INCL CAD BI: CPT

## 2022-11-10 RX ORDER — TRIAMCINOLONE ACETONIDE 40 MG/ML
40 INJECTION, SUSPENSION INTRA-ARTICULAR; INTRAMUSCULAR ONCE
Status: COMPLETED | OUTPATIENT
Start: 2022-11-10 | End: 2022-11-10

## 2022-11-10 RX ORDER — LIDOCAINE HYDROCHLORIDE AND EPINEPHRINE 10; 10 MG/ML; UG/ML
20 INJECTION, SOLUTION INFILTRATION; PERINEURAL ONCE
Status: COMPLETED | OUTPATIENT
Start: 2022-11-10 | End: 2022-11-10

## 2022-11-10 RX ADMIN — TRIAMCINOLONE ACETONIDE 40 MG: 40 INJECTION, SUSPENSION INTRA-ARTICULAR; INTRAMUSCULAR at 10:25

## 2022-11-10 RX ADMIN — LIDOCAINE HYDROCHLORIDE AND EPINEPHRINE 5 ML: 10; 10 INJECTION, SOLUTION INFILTRATION; PERINEURAL at 15:42

## 2022-11-10 ASSESSMENT — ENCOUNTER SYMPTOMS
SHORTNESS OF BREATH: 0
BLOOD IN STOOL: 0
EYES NEGATIVE: 1
ABDOMINAL PAIN: 0
NAUSEA: 0
CONSTIPATION: 0
DIARRHEA: 0
VOMITING: 0
ANAL BLEEDING: 0
ALLERGIC/IMMUNOLOGIC NEGATIVE: 1
COUGH: 1
BACK PAIN: 0
ABDOMINAL DISTENTION: 0
GASTROINTESTINAL NEGATIVE: 1

## 2022-11-10 NOTE — PROGRESS NOTES
[facility]  HISTORY & PHYSICAL  ATTENDING NOTE    Patient's Name/Date of Birth: Stefany Jackson / 1960    Date: November 10, 2022     Chief Complaint   Patient presents with    Consultation     NEW PATIENT: left breast abscess - patient previously on Augmentin. last mammogram 2020 at [place]. Patient states abscess started beginning of October. Patient states healing and doing better. Mammogram and US prior to appointment. Stefany Jackson presents for evaluation of a mammographic abnormality. PCP: Dirk Banerjee DO. Gynecologist: Dr. Beni Lugo. Referred by:  Dr. Beni Lugo    The mammogram was performed at [facility] on 11/10/2022. The patient has noted a change in BSE since presentation. Patient denies nipple discharge. Patient denies a personal history of breast cancer. Breast cancer risk factors include family hx on mother's side, mom with breast CA, and age and gender. Ashkenazi Sabianism Ancestry: No.    Took Augmentin. End of September the sebaceous cyst became infected. OBSTETRIC RELATED HISTORY:  Age of menarche was 14. Age of menopause was 65. Patient denies hormonal therapy. Patient is . Age of first live birth was 33. Patient did breast feed. Is patient interested in fertility information about fertility preservation? No    CANCER SURVEILLANCE HISTORY:  Mammograms: Yes   Breast MRI's: No   Breast Biopsies: No   Colonoscopy: Yes , Dr. Delicia Jarvis  GI Polyps: No   EGD: Yes   Pelvic Exam: Yes   Pap Smear: Yes   Dermatology: Yes Dr. Jnoatan Diehl screening: no  H/O DVT:  no  H/O Radiation:  no        Estimated body mass index is 43.26 kg/m² as calculated from the following:    Height as of this encounter: 5' 6\" (1.676 m). Weight as of this encounter: 268 lb (121.6 kg).   Bra Size: 46DDD    Because violence is so common, we ask all our patients: are you in a relationship or do you live with a person who threatens, hurts, or controls you:  no    Patient drinks moderate caffeinated beverages. Patient does not smoke cigarettes. Patient does not use recreational drugs. Past Medical History:   Diagnosis Date    Alopecia areata     Anxiety     Depression     Hyperlipidemia     Nausea & vomiting     Obesity, Class III, BMI 40-49.9 (morbid obesity) (HCC)     Osteoarthritis     PVC (premature ventricular contraction)     Sleep apnea     CPAP    Thyroid disease        Past Surgical History:   Procedure Laterality Date     SECTION      x2    CHOLECYSTECTOMY      COLONOSCOPY  14    bx done Dr Sherley Pretty  2011         KNEE ARTHROSCOPY      NERVE BLOCK Bilateral 3/5/14    lumbar facet #1    NERVE BLOCK  14    paravertebral facet bilateral lumbar #2    NERVE BLOCK N/A 14    cerv #1    NERVE BLOCK N/A 2014    cervical epidural  #2    NERVE BLOCK N/A 14    cerv #3    NERVE BLOCK Bilateral 2020    intra-articular facet joint injection     OTHER SURGICAL HISTORY  14    Radiofrequency right lumbar L3-4, L4-5, L5-S1    OTHER SURGICAL HISTORY Left 2014    lumbar radiofrequency    PAIN MANAGEMENT PROCEDURE Bilateral 2020    BILATERAL INTRA-ARTICULAR FACET JOINT INJECTION WITH FLUOROSCOPIC GUIDANCE AT L3-4, L4-5, L5-S1 (CPT E4202930, [id number]) performed by Nohemy Barksdale DO at [address] Left 2021    LEFT KNEE TOTAL KNEE ARTHROPLASTY [facility]) performed by Olive Pacheco DO at [address]  14    ashly ,bx sm bowel  Dr Park Watt       Current Outpatient Medications   Medication Sig Dispense Refill    atorvastatin (LIPITOR) 20 MG tablet TAKE ONE TABLET BY MOUTH EVERY EVENING 30 tablet 3    sertraline (ZOLOFT) 100 MG tablet Take 1 tablet by mouth daily Indications: takes 2 tablets at hs Take 2 tablet at hs po 180 tablet 3    naproxen (NAPROSYN) 500 MG tablet TAKE 1 TABLET BY MOUTH 2 TIMES A DAY AS NEEDED FOR PAIN.  DO NOT TAKE WITH DICLOFENAC 60 tablet 0    tiZANidine (ZANAFLEX) 4 MG tablet TAKE ONE TABLET BY MOUTH EVERY 8 HOURS AS NEEDED FOR MUSCLE SPASMS 60 tablet 0    famotidine (PEPCID) 20 MG tablet Take 1 tablet by mouth 2 times daily 180 tablet 1    diclofenac (VOLTAREN) 50 MG EC tablet TAKE 1 TABLET BY MOUTH 2 TIMES A DAY 60 tablet 3    traZODone (DESYREL) 150 MG tablet TAKE ONE TABLET BY MOUTH EVERY EVENING 90 tablet 1    atenolol (TENORMIN) 50 MG tablet TAKE ONE TABLET BY MOUTH NIGHTLY 90 tablet 2    levothyroxine (SYNTHROID) 50 MCG tablet TAKE 1 TABLET BY MOUTH ONE TIME A DAY 90 tablet 2    diclofenac 1 % CREA Apply 2 g topically 4 times daily To affected area 120 g 3    Cholecalciferol (VITAMIN D-3) 5000 UNITS TABS Take  by mouth. No current facility-administered medications for this visit. Family History   Problem Relation Age of Onset    Cancer Mother         breast with bone and liver mets    Bipolar Disorder Father     Other Father         Aspiration Pneumonia    Hypertension Sister     Depression Sister     Anxiety Disorder Sister     Heart Disease Brother         MI with stents    Other Brother         Frontal lobe lesion    Breast Cancer Maternal Grandmother     Heart Disease Maternal Grandfather     Heart Disease Paternal Grandfather     Cancer Other     Depression Other     Mental Illness Other      Ashkenazi Yazdanism Ancestry: No    Allergies   Allergen Reactions    Mobic [Meloxicam] Itching     Burning/Itching of there scalp, palms, back and feet.      Morphine Sulfate Itching    Percocet [Oxycodone-Acetaminophen] Itching    Vicodin [Hydrocodone-Acetaminophen] Itching       Social History     Socioeconomic History    Marital status:      Spouse name: Ambrosio    Number of children: 2    Years of education: 14    Highest education level: Not on file   Occupational History    Occupation: LPN   Tobacco Use    Smoking status: Never    Smokeless tobacco: Never   Vaping Use    Vaping Use: Never used Substance and Sexual Activity    Alcohol use: Yes     Comment: occasionally    Drug use: No    Sexual activity: Yes     Partners: Male   Other Topics Concern    Not on file   Social History Narrative    Patient lives at home with . She is independent with ADL's, and does require the use of an assistive device. Social Determinants of Health     Financial Resource Strain: Low Risk     Difficulty of Paying Living Expenses: Not hard at all   Food Insecurity: No Food Insecurity    Worried About Running Out of Food in the Last Year: Never true    Ran Out of Food in the Last Year: Never true   Transportation Needs: Not on file   Physical Activity: Not on file   Stress: Not on file   Social Connections: Not on file   Intimate Partner Violence: Not on file   Housing Stability: Not on file       Occupation: nursing; [employer]    Review of Systems   Constitutional: Negative. Negative for activity change, appetite change and unexpected weight change. HENT: Negative. Eyes: Negative. Respiratory:  Positive for cough (recent cold). Negative for shortness of breath. Cardiovascular: Negative. Negative for chest pain and leg swelling. Gastrointestinal: Negative. Negative for abdominal distention, abdominal pain, anal bleeding, blood in stool, constipation, diarrhea, nausea and vomiting. Endocrine: Negative. Genitourinary: Negative. Musculoskeletal: Negative. Negative for arthralgias, back pain, gait problem, joint swelling and myalgias. Skin: Negative. Allergic/Immunologic: Negative. Neurological: Negative. Negative for dizziness, weakness and headaches. Hematological: Negative. Psychiatric/Behavioral: Negative. Negative for confusion, decreased concentration and sleep disturbance. ECOG PS:   0  Covid vaccination? Yes  Flu Vaccination?  Yes    /86 (Site: Left Upper Arm, Position: Sitting, Cuff Size: Medium Adult)   Pulse 55   Resp 14   Ht 5' 6\" (1.676 m)   Wt 268 lb (121.6 kg)   LMP 10/13/2016   SpO2 95%   BMI 43.26 kg/m²   Physical Exam  Constitutional:       Appearance: Normal appearance. She is obese. HENT:      Head: Normocephalic and atraumatic. Nose: Nose normal.      Mouth/Throat:      Mouth: Mucous membranes are moist.      Pharynx: Oropharynx is clear. Eyes:      Extraocular Movements: Extraocular movements intact. Pupils: Pupils are equal, round, and reactive to light. Cardiovascular:      Rate and Rhythm: Normal rate and regular rhythm. Pulses: Normal pulses. Heart sounds: Normal heart sounds. Pulmonary:      Effort: Pulmonary effort is normal.      Breath sounds: Normal breath sounds. Chest:   Breasts:     Right: Inverted nipple present. No swelling, bleeding, mass, nipple discharge, skin change or tenderness. Left: Inverted nipple and skin change present. No swelling, bleeding, mass, nipple discharge or tenderness. Abdominal:      General: There is no distension. Palpations: Abdomen is soft. Tenderness: There is no abdominal tenderness. Musculoskeletal:         General: No tenderness or signs of injury. Cervical back: Normal range of motion and neck supple. Lymphadenopathy:      Cervical: No cervical adenopathy. Right cervical: No superficial cervical adenopathy. Left cervical: No superficial cervical adenopathy. Upper Body:      Right upper body: No supraclavicular or axillary adenopathy. Left upper body: No supraclavicular or axillary adenopathy. Skin:     General: Skin is warm and dry. Neurological:      General: No focal deficit present. Mental Status: She is alert and oriented to person, place, and time. Psychiatric:         Mood and Affect: Mood normal.         Behavior: Behavior normal.         Thought Content:  Thought content normal.         Judgment: Judgment normal.       MAMMOGRAM:  normal      ULTRASOUND:  Sebaceous cyst    ASSESSMENT/PLAN:  Left breast sebaceous cyst--plan for excision in office    PROCEDURE NOTE:  Consent obtained. 1% lidocaine with epinephrine was used anesthetize the wound. 15 blade scalpel was used to make an elliptical incision about 2 cm in length around the opening of the sebaceous cyst.  Dissection made down with the scalpel and then changed to scissors. The cyst was removed completely. There was some bleeding from a blood vessel and subcutaneous tissue that was controlled with 3-0 Vicryl x2. I then closed the wound with 3-0 Vicryl and then 3-0 Prolene. Patient has 6 external sutures. We then dressed her with bacitracin and ABD pad. She was advised to remove the sutures in 1 week. She is okay to shower. She is to call me if she has any issues with the wound such as draining, dehiscence, or infection.     Melissa Sanchez MD, MSc, FACS  11/10/2022  1:40 PM

## 2022-11-10 NOTE — PROGRESS NOTES
[Hydrocodone-Acetaminophen] Itching     Social History     Socioeconomic History    Marital status:      Spouse name: Ambrosio    Number of children: 2    Years of education: 14    Highest education level: Not on file   Occupational History    Occupation: LPN   Tobacco Use    Smoking status: Never    Smokeless tobacco: Never   Vaping Use    Vaping Use: Never used   Substance and Sexual Activity    Alcohol use: Yes     Comment: occasionally    Drug use: No    Sexual activity: Yes     Partners: Male   Other Topics Concern    Not on file   Social History Narrative    Patient lives at home with . She is independent with ADL's, and does require the use of an assistive device. Social Determinants of Health     Financial Resource Strain: Low Risk     Difficulty of Paying Living Expenses: Not hard at all   Food Insecurity: No Food Insecurity    Worried About Running Out of Food in the Last Year: Never true    Ran Out of Food in the Last Year: Never true   Transportation Needs: Not on file   Physical Activity: Not on file   Stress: Not on file   Social Connections: Not on file   Intimate Partner Violence: Not on file   Housing Stability: Not on file     Family History   Problem Relation Age of Onset    Heart Disease Brother         MI with stents    Other Brother         Frontal lobe lesion    Heart Disease Maternal Grandfather     Heart Disease Paternal Grandfather     Cancer Other     Depression Other     Mental Illness Other     Cancer Mother         breast with bone and liver mets    Bipolar Disorder Father     Other Father         Aspiration Pneumonia    Hypertension Sister     Depression Sister     Anxiety Disorder Sister        Review of Systems:     Skin: (-) rash,(-) psoriasis,(-) eczema, (-)skin cancer.    Musculoskeletal: (-) fractures,  (-) dislocations,(-) collagen vascular disease, (-) fibromyalgia, (-) multiple sclerosis, (-) muscular dystrophy, (-) RSD,(-) joint pain (-)swelling, (-) joint pain,swelling. Neurologic: (-) epilepsy, (-)seizures,(-) brain tumor,(-) TIA, (-)stroke, (-)headaches, (-)Parkinson disease,(-) memory loss, (-) LOC. Cardiovascular: (-) Chest pain, (-) swelling in legs/feet, (-) SOB, (-) cramping in legs/feet with walking. Constitutional:  The patient is alert and oriented x 3, appears to be stated age and in no distress. Temp 98 °F (36.7 °C)   Ht 5' 6\" (1.676 m)   Wt 264 lb (119.7 kg)   LMP 10/13/2016   BMI 42.61 kg/m²     Skin:  Upon inspection: the skin appears warm, dry and intact. There is not a previous scar over the affected area. There is not any cellulitis, lymphedema or cutaneous lesions noted in the lower extremities. Upon palpation there is no induration noted. Neurologic:  Gait: normal;  Motor exam of the lower extremities show ; quadriceps, hamstrings, foot dorsi and plantar flexors intact R.  5/5 and L. 5/5. Deep tendon reflexes are 2/4 at the knees and 2/4 at the ankles with strong extensor hallicus longus motor strength bilaterally. Sensory to both feet is intact to all sensory roots. Cardiovascular: The vascular exam is normal and is well perfused to distal extremities. Distal pulses DP/PT: R. 2+; L. 2+. There is cap refill noted less than two seconds in all digits. There is not edema of the bilateral lower extremities. There is not varicosities noted in the distal extremities. Lymph:  Upon palpation,  there is no lymphadenopathy noted in bilateral lower extremities. Musculoskeletal:  Gait: normal; examination of the nails and digits reveal no cyanosis or clubbing    Lumbar exam:  On visual inspection, there is no deformity of the spine. full range of motion, no tenderness, palpable spasm or pain on motion. Special tests: Straight Leg Raise negative, Albino testnegative. Hip exam:  Upon inspection, there is no deformity noted. Upon palpation there is not tenderness.   ROM: is   full and semetrical.   Strength: Hip Flexors 5/5; Hip Abductors 5/5; Hip Adduction 5/5. Knee exam:  Left knee exam shows;  range of motion of R. Knee is 0 to 115, and L. Knee is 0 to 120. She does have  pain on motion, effusion is mild, there is not tenderness over the  global region, there are not any masses, there is not ligamentous instability, there is not  deformity noted. Knee exam: right positive for moderate crepitations, some mild tenderness laxity is not noted with  stress. R. Knee:  Lachman's negative, Anterior Drawer negative, Posterior Drawer negative  Gloria's negative, Thallasy  negative,   PF grind test negative, Apprehension test negative, Patellar J sign  negative  L. Knee:  Lachman's negative, Anterior Drawer negative, Posterior Drawer negative  Golria's negative, Thallasy  negative,   PF grind test negative, Apprehension test negative,  Patellar J sign  negative    Xrays:   S/p knee arthroplasty with no signs of loosening    MRI:    n/a  Radiographic findings reviewed with patient    Impression:  Encounter Diagnosis   Name Primary? Primary osteoarthritis of one knee, left Yes       Plan:   Natural history and expected course discussed. Questions answered. Educational materials distributed. Rest, ice, compression, and elevation (RICE) therapy. Reduction in offending activity. I had a lengthy discussion with the patient regarding their diagnosis. I explained treatment options including surgical vs non surgical treatment. I reviewed in detail the risks and benefits and outlined the procedure in detail with expected outcomes and possible complications. I also discussed non surgical treatment such as injections (CSI and visco supplementation), physical therapy, topical creams and NSAID's. They have elected for conservative management at this time. I will proceed with a cortisone injection in the Right knee. Verbal and written consent was obtained for the injections. The skin was prepped with alcohol.  1mL of Kenalog 40mg and 9mL of 0.25% Marcaine was  injected to Right knee. The injection was given through the lateral side of the knee. The patient tolerated the injection well.  I will see the patient back prn

## 2022-11-11 ENCOUNTER — TELEPHONE (OUTPATIENT)
Dept: BREAST CENTER | Age: 62
End: 2022-11-11

## 2022-11-11 NOTE — TELEPHONE ENCOUNTER
Referral to Genetic Counselor has been submitted - patient information has been faxed. Their office staff will contact patient.

## 2022-11-18 ENCOUNTER — TELEPHONE (OUTPATIENT)
Dept: SURGERY | Age: 62
End: 2022-11-18

## 2022-11-18 NOTE — TELEPHONE ENCOUNTER
I called Rico Avery and went over her pathology. She is healing fine.   Her co-worker removed the sutures without issue

## 2022-12-07 DIAGNOSIS — E55.9 VITAMIN D DEFICIENCY: ICD-10-CM

## 2022-12-07 DIAGNOSIS — G47.33 OSA (OBSTRUCTIVE SLEEP APNEA): ICD-10-CM

## 2022-12-07 DIAGNOSIS — R73.03 PREDIABETES: ICD-10-CM

## 2022-12-07 DIAGNOSIS — G47.09 OTHER INSOMNIA: ICD-10-CM

## 2022-12-07 DIAGNOSIS — F32.A ANXIETY AND DEPRESSION: ICD-10-CM

## 2022-12-07 DIAGNOSIS — E03.9 ACQUIRED HYPOTHYROIDISM: ICD-10-CM

## 2022-12-07 DIAGNOSIS — E78.5 DYSLIPIDEMIA: ICD-10-CM

## 2022-12-07 DIAGNOSIS — F41.9 ANXIETY AND DEPRESSION: ICD-10-CM

## 2022-12-07 DIAGNOSIS — I49.3 PVC (PREMATURE VENTRICULAR CONTRACTION): ICD-10-CM

## 2022-12-07 LAB
ALBUMIN SERPL-MCNC: 4.2 G/DL (ref 3.5–5.2)
ALP BLD-CCNC: 70 U/L (ref 35–104)
ALT SERPL-CCNC: 17 U/L (ref 0–32)
ANION GAP SERPL CALCULATED.3IONS-SCNC: 12 MMOL/L (ref 7–16)
AST SERPL-CCNC: 16 U/L (ref 0–31)
BASOPHILS ABSOLUTE: 0.08 E9/L (ref 0–0.2)
BASOPHILS RELATIVE PERCENT: 1.1 % (ref 0–2)
BILIRUB SERPL-MCNC: 0.3 MG/DL (ref 0–1.2)
BUN BLDV-MCNC: 20 MG/DL (ref 6–23)
CALCIUM SERPL-MCNC: 9.3 MG/DL (ref 8.6–10.2)
CHLORIDE BLD-SCNC: 105 MMOL/L (ref 98–107)
CHOLESTEROL, TOTAL: 174 MG/DL (ref 0–199)
CO2: 26 MMOL/L (ref 22–29)
CREAT SERPL-MCNC: 1 MG/DL (ref 0.5–1)
EOSINOPHILS ABSOLUTE: 0.17 E9/L (ref 0.05–0.5)
EOSINOPHILS RELATIVE PERCENT: 2.3 % (ref 0–6)
GFR SERPL CREATININE-BSD FRML MDRD: >60 ML/MIN/1.73
GLUCOSE BLD-MCNC: 94 MG/DL (ref 74–99)
HBA1C MFR BLD: 5.8 % (ref 4–5.6)
HCT VFR BLD CALC: 39 % (ref 34–48)
HDLC SERPL-MCNC: 47 MG/DL
HEMOGLOBIN: 12.4 G/DL (ref 11.5–15.5)
IMMATURE GRANULOCYTES #: 0.03 E9/L
IMMATURE GRANULOCYTES %: 0.4 % (ref 0–5)
LDL CHOLESTEROL CALCULATED: 99 MG/DL (ref 0–99)
LYMPHOCYTES ABSOLUTE: 2.33 E9/L (ref 1.5–4)
LYMPHOCYTES RELATIVE PERCENT: 32 % (ref 20–42)
MCH RBC QN AUTO: 31.2 PG (ref 26–35)
MCHC RBC AUTO-ENTMCNC: 31.8 % (ref 32–34.5)
MCV RBC AUTO: 98 FL (ref 80–99.9)
MONOCYTES ABSOLUTE: 0.6 E9/L (ref 0.1–0.95)
MONOCYTES RELATIVE PERCENT: 8.2 % (ref 2–12)
NEUTROPHILS ABSOLUTE: 4.08 E9/L (ref 1.8–7.3)
NEUTROPHILS RELATIVE PERCENT: 56 % (ref 43–80)
PDW BLD-RTO: 13.1 FL (ref 11.5–15)
PLATELET # BLD: 244 E9/L (ref 130–450)
PMV BLD AUTO: 11.6 FL (ref 7–12)
POTASSIUM SERPL-SCNC: 4.6 MMOL/L (ref 3.5–5)
RBC # BLD: 3.98 E12/L (ref 3.5–5.5)
SODIUM BLD-SCNC: 143 MMOL/L (ref 132–146)
T4 FREE: 0.99 NG/DL (ref 0.93–1.7)
TOTAL PROTEIN: 6.7 G/DL (ref 6.4–8.3)
TRIGL SERPL-MCNC: 140 MG/DL (ref 0–149)
TSH SERPL DL<=0.05 MIU/L-ACNC: 2.16 UIU/ML (ref 0.27–4.2)
VITAMIN D 25-HYDROXY: 21 NG/ML (ref 30–100)
VLDLC SERPL CALC-MCNC: 28 MG/DL
WBC # BLD: 7.3 E9/L (ref 4.5–11.5)

## 2022-12-28 ENCOUNTER — TELEPHONE (OUTPATIENT)
Dept: ADMINISTRATIVE | Age: 62
End: 2022-12-28

## 2022-12-29 ENCOUNTER — PATIENT MESSAGE (OUTPATIENT)
Dept: ORTHOPEDIC SURGERY | Age: 62
End: 2022-12-29

## 2022-12-30 RX ORDER — DICLOFENAC SODIUM 75 MG/1
75 TABLET, DELAYED RELEASE ORAL 2 TIMES DAILY
Qty: 60 TABLET | Refills: 3 | Status: SHIPPED | OUTPATIENT
Start: 2022-12-30

## 2022-12-31 NOTE — TELEPHONE ENCOUNTER
From: Margarita Bear  To: Remington Arroyo  Sent: 12/29/2022 9:37 PM EST  Subject: Melanie Rodriguez,I would like to try to increase the Diclofanac to 75 mg bid if you would be willing to send in to [employer] mail. Thank you and Happy New Year. [facility].

## 2023-01-04 ENCOUNTER — OFFICE VISIT (OUTPATIENT)
Dept: PHYSICAL MEDICINE AND REHAB | Age: 63
End: 2023-01-04

## 2023-01-04 VITALS
HEART RATE: 66 BPM | SYSTOLIC BLOOD PRESSURE: 135 MMHG | TEMPERATURE: 97.2 F | DIASTOLIC BLOOD PRESSURE: 66 MMHG | HEIGHT: 66 IN | WEIGHT: 279 LBS | BODY MASS INDEX: 44.84 KG/M2

## 2023-01-04 DIAGNOSIS — M43.16 SPONDYLOLISTHESIS AT L4-L5 LEVEL: ICD-10-CM

## 2023-01-04 DIAGNOSIS — G56.03 BILATERAL CARPAL TUNNEL SYNDROME: ICD-10-CM

## 2023-01-04 DIAGNOSIS — M47.816 FACET SYNDROME, LUMBAR: ICD-10-CM

## 2023-01-04 DIAGNOSIS — M79.609 PAIN IN EXTREMITY, UNSPECIFIED EXTREMITY: Primary | ICD-10-CM

## 2023-01-04 DIAGNOSIS — M17.12 PRIMARY OSTEOARTHRITIS OF ONE KNEE, LEFT: ICD-10-CM

## 2023-01-04 DIAGNOSIS — M47.816 LUMBAR SPONDYLOSIS: ICD-10-CM

## 2023-01-04 RX ORDER — TRIAMCINOLONE ACETONIDE 40 MG/ML
40 INJECTION, SUSPENSION INTRA-ARTICULAR; INTRAMUSCULAR ONCE
Status: COMPLETED | OUTPATIENT
Start: 2023-01-04 | End: 2023-01-04

## 2023-01-04 RX ORDER — TRAMADOL HYDROCHLORIDE 50 MG/1
50 TABLET ORAL EVERY 6 HOURS PRN
Qty: 28 TABLET | Refills: 0 | Status: SHIPPED | OUTPATIENT
Start: 2023-01-04 | End: 2023-01-11

## 2023-01-04 RX ORDER — LIDOCAINE HYDROCHLORIDE 10 MG/ML
4 INJECTION, SOLUTION INFILTRATION; PERINEURAL ONCE
Status: COMPLETED | OUTPATIENT
Start: 2023-01-04 | End: 2023-01-04

## 2023-01-04 RX ADMIN — LIDOCAINE HYDROCHLORIDE 4 ML: 10 INJECTION, SOLUTION INFILTRATION; PERINEURAL at 16:17

## 2023-01-04 RX ADMIN — TRIAMCINOLONE ACETONIDE 40 MG: 40 INJECTION, SUSPENSION INTRA-ARTICULAR; INTRAMUSCULAR at 16:18

## 2023-01-04 NOTE — PROGRESS NOTES
Rakesh Landaverde D.O. [place] [facility]   [address] [facility] [address]  Phone: [phone]  Fax: [phone]        23    Chief Complaint   Patient presents with    Wrist Pain     Left carpal tunnel injection        HPI:  Miller Bamberger is a 65y.o. year old woman seen today in follow up regarding carpal tunnel syndrome. Interval history: Since the last visit the patient has not had a carpal tunnel injection since 3/10/22. She feels like the carpal tunnel has significantly worsened. She is using night splints. Last EMG was in 2019 showed moderate CTS. Her back pain and left leg pain has also recurred. The last interventional treatment was bilateral L3-4, L4-5, L5-S1 facet joint injections in 2020. She had minimal relief from that injection. Today, the pain is rated Pain Score:   4 where 0 is no pain and 10 is pain as bad as it can be. The pain is located in the left hand,  does not radiate, and is described as numb. This pain occurs intermittently. The symptoms have been better since onset. Symptoms are exacerbated by standing and walking use of hand. Factors which relieve the pain include iontophoresis, light therapy. Other associated symptoms include stiffness. Otherwise, the pain assessment has not changed since the last visit.      Past Medical History:   Diagnosis Date    Alopecia areata     Anxiety     Depression     Hyperlipidemia     Nausea & vomiting     Obesity, Class III, BMI 40-49.9 (morbid obesity) (HCC)     Osteoarthritis     PVC (premature ventricular contraction)     Sleep apnea     CPAP    Thyroid disease      Past Surgical History:   Procedure Laterality Date     SECTION      x2    CHOLECYSTECTOMY      COLONOSCOPY  14    bx done Dr Janet Chairez  2011         KNEE ARTHROSCOPY      NERVE BLOCK Bilateral 3/5/14    lumbar facet #1    NERVE BLOCK  14    paravertebral facet bilateral lumbar #2    NERVE BLOCK N/A 4 9 14    cerv #1    NERVE BLOCK N/A 4/16/2014    cervical epidural  #2    NERVE BLOCK N/A 4 23 14    cerv #3    NERVE BLOCK Bilateral 12/21/2020    intra-articular facet joint injection     OTHER SURGICAL HISTORY  07/16/14    Radiofrequency right lumbar L3-4, L4-5, L5-S1    OTHER SURGICAL HISTORY Left 08 18 2014    lumbar radiofrequency    PAIN MANAGEMENT PROCEDURE Bilateral 12/21/2020    BILATERAL INTRA-ARTICULAR FACET JOINT INJECTION WITH FLUOROSCOPIC GUIDANCE AT L3-4, L4-5, L5-S1 (CPT 06826, 59794) performed by Johanne Pablo DO at [address] [facility] Left 4/28/2021    LEFT KNEE TOTAL KNEE ARTHROPLASTY [facility]) performed by Deloris Bedolla DO at [address] [facility]  12/16/14    ashly ,bx sm bowel  Dr Anderson Counts History     Tobacco Use    Smoking status: Never    Smokeless tobacco: Never   Vaping Use    Vaping Use: Never used   Substance Use Topics    Alcohol use: Yes     Comment: occasionally    Drug use: No     Family History   Problem Relation Age of Onset    Cancer Mother         breast with bone and liver mets    Bipolar Disorder Father     Other Father         Aspiration Pneumonia    Hypertension Sister     Depression Sister     Anxiety Disorder Sister     Heart Disease Brother         MI with stents    Other Brother         Frontal lobe lesion    Breast Cancer Maternal Grandmother     Heart Disease Maternal Grandfather     Heart Disease Paternal Grandfather     Cancer Other     Depression Other     Mental Illness Other        Current Outpatient Medications   Medication Sig Dispense Refill    traMADol (ULTRAM) 50 MG tablet Take 1 tablet by mouth every 6 hours as needed for Pain for up to 7 days. Intended supply: 3 days.  Take lowest dose possible to manage pain 28 tablet 0    ketoconazole (NIZORAL) 2 % cream Apply topically daily for 14 days 30 g 0    diclofenac (VOLTAREN) 75 MG EC tablet Take 1 tablet by mouth 2 times daily 60 tablet 3    diclofenac (VOLTAREN) 50 MG EC tablet TAKE 1 TABLET BY MOUTH 2 TIMES A DAY 60 tablet 3    sertraline (ZOLOFT) 100 MG tablet Take 2 tablets by mouth at bedtime Indications: takes 2 tablets at hs 180 tablet 1    traZODone (DESYREL) 150 MG tablet TAKE ONE TABLET BY MOUTH EVERY EVENING 90 tablet 1    tiZANidine (ZANAFLEX) 4 MG tablet TAKE ONE TABLET BY MOUTH EVERY 8 HOURS AS NEEDED FOR MUSCLE SPASMS 60 tablet 0    atorvastatin (LIPITOR) 20 MG tablet TAKE ONE TABLET BY MOUTH EVERY EVENING 90 tablet 1    famotidine (PEPCID) 20 MG tablet Take 1 tablet by mouth 2 times daily 180 tablet 1    atenolol (TENORMIN) 50 MG tablet TAKE ONE TABLET BY MOUTH NIGHTLY 90 tablet 2    levothyroxine (SYNTHROID) 50 MCG tablet TAKE 1 TABLET BY MOUTH ONE TIME A DAY 90 tablet 2    diclofenac 1 % CREA Apply 2 g topically 4 times daily To affected area 120 g 3    Cholecalciferol (VITAMIN D-3) 5000 UNITS TABS Take  by mouth. naproxen (NAPROSYN) 500 MG tablet TAKE 1 TABLET BY MOUTH 2 TIMES A DAY AS NEEDED FOR PAIN; DO NOT TAKE WITH DICLOFENAC 60 tablet 0     No current facility-administered medications for this visit. Allergies   Allergen Reactions    Mobic [Meloxicam] Itching     Burning/Itching of there scalp, palms, back and feet. Morphine Sulfate Itching    Percocet [Oxycodone-Acetaminophen] Itching    Vicodin [Hydrocodone-Acetaminophen] Itching       Review of Systems:  No new weakness, paresthesia, incontinence of bowel or bladder, saddle anesthesia, falls or gait dysfunction. Otherwise, per HPI. Physical Exam:   Blood pressure 135/66, pulse 66, temperature 97.2 °F (36.2 °C), temperature source Temporal, height 5' 6\" (1.676 m), weight 279 lb (126.6 kg), last menstrual period 10/13/2016, not currently breastfeeding. GENERAL: The patient is in no apparent distress. Body habitus is obese. MSK: There is no joint effusion, deformity, instability, swelling, erythema or warmth. AROM is full in the spine and extremities. Spinal curvatures are normal.    Negative Tinel. Tender to palpation bilateral lumbar paraspinals. SLR was negative. NEURO: Gait is normal. No focal sensorimotor deficit. Reflexes 2+ and symmetric in lower extremities. Impression:   1. Pain in extremity, unspecified extremity    2. Primary osteoarthritis of one knee, left    3. Bilateral carpal tunnel syndrome    4. Facet syndrome, lumbar    5. Spondylolisthesis at L4-L5 level    6. Lumbar spondylosis        Plan:  Controlled Substance Monitoring:    Acute and Chronic Pain Monitoring:   RX Monitoring 1/4/2023   Attestation -   Periodic Controlled Substance Monitoring No signs of potential drug abuse or diversion identified. Chronic Pain > 50 MEDD -         Continue current medications. Orders Placed This Encounter   Medications    traMADol (ULTRAM) 50 MG tablet     Sig: Take 1 tablet by mouth every 6 hours as needed for Pain for up to 7 days. Intended supply: 3 days. Take lowest dose possible to manage pain     Dispense:  28 tablet     Refill:  0     Reduce doses taken as pain becomes manageable    triamcinolone acetonide (KENALOG-40) injection 40 mg    lidocaine 1 % injection 4 mL       Orders Placed This Encounter   Procedures    US GUIDED NEEDLE PLACEMENT     Order Specific Question:   Will this be performed in the office today? If yes, the order will immediately fall to your reading worklist where you can document your result.      Answer:   Keely Hameed MD, Orthopaedics (hand and upper extremities)[facility] ([facility])     Referral Priority:   Routine     Referral Type:   Eval and Treat     Referral Reason:   Specialty Services Required     Referred to Provider:   Violetta Kelly MD     Requested Specialty:   Orthopedic Surgery     Number of Visits Requested:   1    Ambulatory epidural steroid injection     Scheduling Instructions:      Bilateral L4-5 Transforaminal Epidural steroid injection by . Dr. Opal Rueda will update H&P if I haven't seen them in the last 30 days at the time of the injection. [place] INJECTION THERAPEUTIC CARPAL TUNNEL       The patient was educated about the diagnosis, prognosis, indications, risks and benefits of treatment. An opportunity to ask questions was given to the patient and questions were answered. The patient agreed to proceed with the recommended treatment as described above. Follow up prn  Thank you for allowing me to participate in the care of your patient. Cha Mendieta D.O., P.T. Board Certified Physical Medicine and Rehabilitation  Board Certified [facility] [address] [facility]. [facility]  [address] [facility] [facility]  Phone: [phone]  Fax: [phone]    1/5/2023    Chief Complaint   Patient presents with    Wrist Pain     Left carpal tunnel injection        Last injection: 3/10/22  Taking anticoagulants/antiplatelets: No  Diabetic: No  Febrile/active infection: No    After explaining the indications, risks, benefits and alternatives of a Left carpal tunnel injection, the patient agreed to proceed. Permit wwas signed and scanned into the media. The patient was placed in the seated position. The skin on the volar wrist was prepared with chloraprep. Ethyl Chloride vapocoolant spray was used for local anesthesia. Using an aseptic, no touch technique, a 25 gauge, 5/8\" needle with 2 cc of Xylocaine 1% and 1 cc of Kenalog 40 mg/cc was directed into the carpal tunnel using ultrasound guidance  After negative aspiration, the medication was injected. Adequate hemostasis was obtained and a bandage applied to the injection site. The patient tolerated the procedure well and was educated in post injection care. There was post injection reduction in pain. The images are uploaded separately in the EMR.       Cha Mendieta D.O., P.T.  Board Certified Physical Medicine and Rehabilitation  Board Certified Electrodiagnostic Medicine    Administrations This Visit       lidocaine 1 % injection 4 mL       Admin Date  01/04/2023  16:17 Action  Given Dose  4 mL Route  Other Site   Administered By  Renetta Whitlock MA    Ordering Provider: Byron rTammell DO    NDC: 8850-8866-87    Lot#: [id number]    [id number]: [address]    Patient Supplied?: No              triamcinolone acetonide (KENALOG-40) injection 40 mg       Admin Date  01/04/2023  16:18 Action  Given Dose  40 mg Route  Intra-artICUlar Site   Administered By  Renetta Whitlock MA    Ordering Provider: Byron Trammell DO    NDC: 9144-3463-49    Lot#: [id number]    : [employer] (PRIMARY CARE)    Patient Supplied?: No

## 2023-01-06 ENCOUNTER — TELEPHONE (OUTPATIENT)
Dept: PHYSICAL MEDICINE AND REHAB | Age: 63
End: 2023-01-06

## 2023-01-06 ENCOUNTER — PREP FOR PROCEDURE (OUTPATIENT)
Dept: PHYSICAL MEDICINE AND REHAB | Age: 63
End: 2023-01-06

## 2023-01-11 ENCOUNTER — TELEPHONE (OUTPATIENT)
Dept: PHYSICAL MEDICINE AND REHAB | Age: 63
End: 2023-01-11

## 2023-01-12 ENCOUNTER — OFFICE VISIT (OUTPATIENT)
Dept: PHYSICAL MEDICINE AND REHAB | Age: 63
End: 2023-01-12

## 2023-01-12 VITALS
HEART RATE: 67 BPM | HEIGHT: 66 IN | DIASTOLIC BLOOD PRESSURE: 75 MMHG | WEIGHT: 275 LBS | BODY MASS INDEX: 44.2 KG/M2 | TEMPERATURE: 96.6 F | SYSTOLIC BLOOD PRESSURE: 128 MMHG

## 2023-01-12 DIAGNOSIS — M79.609 PAIN IN EXTREMITY, UNSPECIFIED EXTREMITY: Primary | ICD-10-CM

## 2023-01-12 RX ORDER — TRIAMCINOLONE ACETONIDE 40 MG/ML
40 INJECTION, SUSPENSION INTRA-ARTICULAR; INTRAMUSCULAR ONCE
Status: COMPLETED | OUTPATIENT
Start: 2023-01-12 | End: 2023-01-12

## 2023-01-12 RX ORDER — LIDOCAINE HYDROCHLORIDE 10 MG/ML
4 INJECTION, SOLUTION INFILTRATION; PERINEURAL ONCE
Status: COMPLETED | OUTPATIENT
Start: 2023-01-12 | End: 2023-01-12

## 2023-01-12 RX ADMIN — LIDOCAINE HYDROCHLORIDE 4 ML: 10 INJECTION, SOLUTION INFILTRATION; PERINEURAL at 14:03

## 2023-01-12 RX ADMIN — TRIAMCINOLONE ACETONIDE 40 MG: 40 INJECTION, SUSPENSION INTRA-ARTICULAR; INTRAMUSCULAR at 14:04

## 2023-01-12 NOTE — PROGRESS NOTES
Shanti Oh D.O. [facility] [facility]  [address] [facility] [address]  Phone: [phone]  Fax: [phone]    1/12/2023    Chief Complaint   Patient presents with    Wrist Pain     Right carpal tunnel injection        Last injection: 3/3/22  Taking anticoagulants/antiplatelets: No  Diabetic: No  Febrile/active infection: No    After explaining the indications, risks, benefits and alternatives of a right carpal tunnel injection, the patient agreed to proceed. Permit was signed and scanned into the media. The patient was placed in the seated position. The skin on the volar wrist was prepared with chloraprep. Ethyl Chloride vapocoolant spray was used for local anesthesia. Using an aseptic, no touch technique, a 25 gauge, 5/8\" needle with 2 cc of Xylocaine 1% and 1 cc of Kenalog 40 mg/cc was directed into the carpal tunnel using ultrasound guidance  After negative aspiration, the medication was injected. Adequate hemostasis was obtained and a bandage applied to the injection site. The patient tolerated the procedure well and was educated in post injection care. There was post injection reduction in pain. The images are uploaded separately in the EMR. Shanti Oh D.O., P.T.   Board Certified Physical Medicine and Rehabilitation  Board Certified Electrodiagnostic Medicine    Administrations This Visit       lidocaine 1 % injection 4 mL       Admin Date  01/12/2023  14:03 Action  Given Dose  4 mL Route  Other Site   Administered By  Shiav Lopez LPN    Ordering Provider: DO ANNE Lundy: [id number]    Lot#: [id number]    [id number]: [address]    Patient Supplied?: No              triamcinolone acetonide (KENALOG-40) injection 40 mg       Admin Date  01/12/2023  14:04 Action  Given Dose  40 mg Route  Intra-artICUlar Site   Administered By  Shiva Lopez LPN    Ordering Provider: DO ANNE Lundy: [id number]    Lot#: [id number]    : [employer] (PRIMARY CARE)    Patient Supplied?: No

## 2023-01-16 ENCOUNTER — APPOINTMENT (OUTPATIENT)
Dept: GENERAL RADIOLOGY | Age: 63
End: 2023-01-16
Payer: COMMERCIAL

## 2023-01-16 ENCOUNTER — APPOINTMENT (OUTPATIENT)
Dept: CT IMAGING | Age: 63
End: 2023-01-16
Payer: COMMERCIAL

## 2023-01-16 ENCOUNTER — HOSPITAL ENCOUNTER (EMERGENCY)
Age: 63
Discharge: LEFT BEFORE DISCHARGE | End: 2023-01-16
Payer: COMMERCIAL

## 2023-01-16 ENCOUNTER — HOSPITAL ENCOUNTER (EMERGENCY)
Age: 63
Discharge: HOME | End: 2023-01-16
Attending: EMERGENCY MEDICINE
Payer: COMMERCIAL

## 2023-01-16 VITALS
SYSTOLIC BLOOD PRESSURE: 118 MMHG | DIASTOLIC BLOOD PRESSURE: 59 MMHG | TEMPERATURE: 97.6 F | HEART RATE: 65 BPM | RESPIRATION RATE: 18 BRPM | OXYGEN SATURATION: 98 %

## 2023-01-16 VITALS
DIASTOLIC BLOOD PRESSURE: 77 MMHG | TEMPERATURE: 97.4 F | SYSTOLIC BLOOD PRESSURE: 142 MMHG | RESPIRATION RATE: 18 BRPM | HEART RATE: 82 BPM | OXYGEN SATURATION: 96 %

## 2023-01-16 DIAGNOSIS — E86.0 DEHYDRATION: ICD-10-CM

## 2023-01-16 DIAGNOSIS — N30.00 ACUTE CYSTITIS WITHOUT HEMATURIA: Primary | ICD-10-CM

## 2023-01-16 DIAGNOSIS — R55 SYNCOPE AND COLLAPSE: ICD-10-CM

## 2023-01-16 LAB
ANION GAP SERPL CALCULATED.3IONS-SCNC: 12 MMOL/L (ref 7–16)
BACTERIA: ABNORMAL /HPF
BASOPHILS ABSOLUTE: 0.08 E9/L (ref 0–0.2)
BASOPHILS RELATIVE PERCENT: 0.5 % (ref 0–2)
BILIRUBIN URINE: ABNORMAL
BLOOD, URINE: NEGATIVE
BUN BLDV-MCNC: 18 MG/DL (ref 6–23)
CALCIUM SERPL-MCNC: 9.3 MG/DL (ref 8.6–10.2)
CHLORIDE BLD-SCNC: 97 MMOL/L (ref 98–107)
CLARITY: ABNORMAL
CO2: 26 MMOL/L (ref 22–29)
COLOR: YELLOW
CREAT SERPL-MCNC: 1.3 MG/DL (ref 0.5–1)
EKG ATRIAL RATE: 63 BPM
EKG P AXIS: 51 DEGREES
EKG P-R INTERVAL: 128 MS
EKG Q-T INTERVAL: 410 MS
EKG QRS DURATION: 72 MS
EKG QTC CALCULATION (BAZETT): 419 MS
EKG R AXIS: 7 DEGREES
EKG T AXIS: 20 DEGREES
EKG VENTRICULAR RATE: 63 BPM
EOSINOPHILS ABSOLUTE: 0.03 E9/L (ref 0.05–0.5)
EOSINOPHILS RELATIVE PERCENT: 0.2 % (ref 0–6)
EPITHELIAL CELLS, UA: ABNORMAL /HPF
FINE CASTS, UA: ABNORMAL /LPF (ref 0–2)
GFR SERPL CREATININE-BSD FRML MDRD: 47 ML/MIN/1.73
GLUCOSE BLD-MCNC: 129 MG/DL (ref 74–99)
GLUCOSE URINE: NEGATIVE MG/DL
HCT VFR BLD CALC: 42.2 % (ref 34–48)
HEMOGLOBIN: 13.1 G/DL (ref 11.5–15.5)
IMMATURE GRANULOCYTES #: 0.19 E9/L
IMMATURE GRANULOCYTES %: 1.1 % (ref 0–5)
INFLUENZA A BY PCR: NOT DETECTED
INFLUENZA B BY PCR: NOT DETECTED
KETONES, URINE: ABNORMAL MG/DL
LEUKOCYTE ESTERASE, URINE: ABNORMAL
LYMPHOCYTES ABSOLUTE: 0.89 E9/L (ref 1.5–4)
LYMPHOCYTES RELATIVE PERCENT: 5 % (ref 20–42)
MAGNESIUM: 2.3 MG/DL (ref 1.6–2.6)
MCH RBC QN AUTO: 30.5 PG (ref 26–35)
MCHC RBC AUTO-ENTMCNC: 31 % (ref 32–34.5)
MCV RBC AUTO: 98.1 FL (ref 80–99.9)
MONOCYTES ABSOLUTE: 1.23 E9/L (ref 0.1–0.95)
MONOCYTES RELATIVE PERCENT: 7 % (ref 2–12)
NEUTROPHILS ABSOLUTE: 15.22 E9/L (ref 1.8–7.3)
NEUTROPHILS RELATIVE PERCENT: 86.2 % (ref 43–80)
NITRITE, URINE: NEGATIVE
PDW BLD-RTO: 12.9 FL (ref 11.5–15)
PH UA: 5.5 (ref 5–9)
PLATELET # BLD: 239 E9/L (ref 130–450)
PMV BLD AUTO: 11 FL (ref 7–12)
POTASSIUM SERPL-SCNC: 3.7 MMOL/L (ref 3.5–5)
PROTEIN UA: 100 MG/DL
RBC # BLD: 4.3 E12/L (ref 3.5–5.5)
RBC UA: ABNORMAL /HPF (ref 0–2)
SARS-COV-2, NAAT: NOT DETECTED
SODIUM BLD-SCNC: 135 MMOL/L (ref 132–146)
SPECIFIC GRAVITY UA: 1.02 (ref 1–1.03)
TROPONIN, HIGH SENSITIVITY: 11 NG/L (ref 0–9)
UROBILINOGEN, URINE: 1 E.U./DL
WBC # BLD: 17.6 E9/L (ref 4.5–11.5)
WBC UA: >20 /HPF (ref 0–5)

## 2023-01-16 PROCEDURE — 87088 URINE BACTERIA CULTURE: CPT

## 2023-01-16 PROCEDURE — 99285 EMERGENCY DEPT VISIT HI MDM: CPT

## 2023-01-16 PROCEDURE — 80048 BASIC METABOLIC PNL TOTAL CA: CPT

## 2023-01-16 PROCEDURE — 87635 SARS-COV-2 COVID-19 AMP PRB: CPT

## 2023-01-16 PROCEDURE — 83735 ASSAY OF MAGNESIUM: CPT

## 2023-01-16 PROCEDURE — 73560 X-RAY EXAM OF KNEE 1 OR 2: CPT

## 2023-01-16 PROCEDURE — 73502 X-RAY EXAM HIP UNI 2-3 VIEWS: CPT

## 2023-01-16 PROCEDURE — 70450 CT HEAD/BRAIN W/O DYE: CPT

## 2023-01-16 PROCEDURE — 81001 URINALYSIS AUTO W/SCOPE: CPT

## 2023-01-16 PROCEDURE — 2580000003 HC RX 258: Performed by: EMERGENCY MEDICINE

## 2023-01-16 PROCEDURE — 99281 EMR DPT VST MAYX REQ PHY/QHP: CPT

## 2023-01-16 PROCEDURE — 87502 INFLUENZA DNA AMP PROBE: CPT

## 2023-01-16 PROCEDURE — 96374 THER/PROPH/DIAG INJ IV PUSH: CPT

## 2023-01-16 PROCEDURE — 87077 CULTURE AEROBIC IDENTIFY: CPT

## 2023-01-16 PROCEDURE — 96361 HYDRATE IV INFUSION ADD-ON: CPT

## 2023-01-16 PROCEDURE — 72125 CT NECK SPINE W/O DYE: CPT

## 2023-01-16 PROCEDURE — 87186 SC STD MICRODIL/AGAR DIL: CPT

## 2023-01-16 PROCEDURE — 6360000002 HC RX W HCPCS: Performed by: EMERGENCY MEDICINE

## 2023-01-16 PROCEDURE — 71046 X-RAY EXAM CHEST 2 VIEWS: CPT

## 2023-01-16 PROCEDURE — 85025 COMPLETE CBC W/AUTO DIFF WBC: CPT

## 2023-01-16 PROCEDURE — 84484 ASSAY OF TROPONIN QUANT: CPT

## 2023-01-16 RX ORDER — LEVOFLOXACIN 750 MG/1
750 TABLET ORAL DAILY
Qty: 7 TABLET | Refills: 0 | Status: SHIPPED | OUTPATIENT
Start: 2023-01-16 | End: 2023-01-23

## 2023-01-16 RX ORDER — 0.9 % SODIUM CHLORIDE 0.9 %
1000 INTRAVENOUS SOLUTION INTRAVENOUS ONCE
Status: COMPLETED | OUTPATIENT
Start: 2023-01-16 | End: 2023-01-16

## 2023-01-16 RX ADMIN — CEFTRIAXONE 1000 MG: 1 INJECTION, POWDER, FOR SOLUTION INTRAMUSCULAR; INTRAVENOUS at 16:57

## 2023-01-16 RX ADMIN — SODIUM CHLORIDE 1000 ML: 9 INJECTION, SOLUTION INTRAVENOUS at 15:38

## 2023-01-16 ASSESSMENT — PAIN SCALES - GENERAL: PAINLEVEL_OUTOF10: 7

## 2023-01-16 ASSESSMENT — ENCOUNTER SYMPTOMS
COUGH: 0
COLOR CHANGE: 0
RHINORRHEA: 0
ABDOMINAL PAIN: 0
VOMITING: 0
BACK PAIN: 1
NAUSEA: 0
BLOOD IN STOOL: 0
SHORTNESS OF BREATH: 0

## 2023-01-16 ASSESSMENT — PAIN - FUNCTIONAL ASSESSMENT: PAIN_FUNCTIONAL_ASSESSMENT: NONE - DENIES PAIN

## 2023-01-16 NOTE — ED PROVIDER NOTES
ED PROVIDER NOTE    Chief Complaint   Patient presents with    Loss of Consciousness     Per patient had syncope Saturday night. Now c/o right side pain and right knee pain as well as general fatigue and weakness        HPI:  1/16/23,   Time: 3:18 PM SUMA Lopes is a 65 y.o. female presenting to the ED for fall, syncope, joint pain. 3 nights ago patient had a few drinks, later that night developed chills and rigors. The following night patient was sitting in a chair in the kitchen, felt lightheaded, and next thing she knew she woke up on the floor. Since that incident having pain in the right sided low back, right hip, and right knee. Associated chills, extreme thirst, and fatigue. Today at work coworkers noted she may be confused and advised her to go to the ED. No fever, cough, chest pain, neck pain, headache, shortness of breath, abdominal pain, nausea, vomiting, diarrhea. Normal po fluid intake and urine output but appetite very poor. Chart review:  hx of anxiety, depression, HLD, NINFA, thyroid disease    Reviewed office note from PM&R Dr. Carlos Victoria 1/12/23:  Had right carpal tunnel injection    Review of Systems:     Review of Systems   Constitutional:  Positive for appetite change, chills and fatigue. Negative for fever. HENT:  Negative for congestion and rhinorrhea. Eyes:  Negative for visual disturbance. Respiratory:  Negative for cough and shortness of breath. Cardiovascular:  Negative for chest pain. Gastrointestinal:  Negative for abdominal pain, blood in stool, nausea and vomiting. Endocrine: Positive for polydipsia. Genitourinary:  Negative for decreased urine volume and difficulty urinating. Musculoskeletal:  Positive for arthralgias and back pain. Negative for neck pain. Skin:  Negative for color change.    Neurological:  Negative for dizziness, syncope, weakness, light-headedness, numbness and headaches.       --------------------------------------------- PAST HISTORY ---------------------------------------------  Past Medical History:   Past Medical History:   Diagnosis Date    Alopecia areata     Anxiety     Depression     Hyperlipidemia     Nausea & vomiting     Obesity, Class III, BMI 40-49.9 (morbid obesity) (HCC)     Osteoarthritis     PVC (premature ventricular contraction)     Sleep apnea     CPAP    Thyroid disease        Past Surgical History:   Past Surgical History:   Procedure Laterality Date     SECTION      x2    CHOLECYSTECTOMY      COLONOSCOPY  14    bx done Dr Kajal Peralta  2011         KNEE ARTHROSCOPY      NERVE BLOCK Bilateral 3/5/14    lumbar facet #1    NERVE BLOCK  14    paravertebral facet bilateral lumbar #2    NERVE BLOCK N/A 14    cerv #1    NERVE BLOCK N/A 2014    cervical epidural  #2    NERVE BLOCK N/A 14    cerv #3    NERVE BLOCK Bilateral 2020    intra-articular facet joint injection     OTHER SURGICAL HISTORY  14    Radiofrequency right lumbar L3-4, L4-5, L5-S1    OTHER SURGICAL HISTORY Left 2014    lumbar radiofrequency    PAIN MANAGEMENT PROCEDURE Bilateral 2020    BILATERAL INTRA-ARTICULAR FACET JOINT INJECTION WITH FLUOROSCOPIC GUIDANCE AT L3-4, L4-5, L5-S1 (CPT 80799, 30556) performed by Bobby Gomes DO at [address] Left 2021    LEFT KNEE TOTAL KNEE ARTHROPLASTY [facility]) performed by Kimberly Pina DO at [address] [facility]  14    ashly ,bx sm bowel  Dr Laura Jurado History:   Social History     Socioeconomic History    Marital status:      Spouse name: Ambrosio    Number of children: 2    Years of education: 14    Highest education level: None   Occupational History    Occupation: LPN   Tobacco Use    Smoking status: Never    Smokeless tobacco: Never   Vaping Use    Vaping Use: Never used   Substance and Sexual Activity    Alcohol use:  Yes Comment: occasionally    Drug use: No    Sexual activity: Yes     Partners: Male   Social History Narrative    Patient lives at home with . She is independent with ADL's, and does require the use of an assistive device. Family History:   Family History   Problem Relation Age of Onset    Cancer Mother         breast with bone and liver mets    Bipolar Disorder Father     Other Father         Aspiration Pneumonia    Hypertension Sister     Depression Sister     Anxiety Disorder Sister     Heart Disease Brother         MI with stents    Other Brother         Frontal lobe lesion    Breast Cancer Maternal Grandmother     Heart Disease Maternal Grandfather     Heart Disease Paternal Grandfather     Cancer Other     Depression Other     Mental Illness Other        The patients home medications have been reviewed. Allergies: Allergies   Allergen Reactions    Mobic [Meloxicam] Itching     Burning/Itching of there scalp, palms, back and feet. Morphine Sulfate Itching    Percocet [Oxycodone-Acetaminophen] Itching    Vicodin [Hydrocodone-Acetaminophen] Itching           ---------------------------------------------------PHYSICAL EXAM--------------------------------------    BP (!) 118/59   Pulse 65   Temp 97.6 °F (36.4 °C) (Infrared)   Resp 18   LMP 10/13/2016   SpO2 98%     Physical Exam  Vitals and nursing note reviewed. Constitutional:       General: She is not in acute distress. Appearance: She is not toxic-appearing. HENT:      Mouth/Throat:      Mouth: Mucous membranes are moist.   Eyes:      General: No scleral icterus. Extraocular Movements: Extraocular movements intact. Pupils: Pupils are equal, round, and reactive to light. Cardiovascular:      Rate and Rhythm: Normal rate and regular rhythm. Pulses: Normal pulses. Heart sounds: Normal heart sounds. No murmur heard. Pulmonary:      Effort: Pulmonary effort is normal. No respiratory distress.       Breath sounds: Normal breath sounds. No wheezing or rales. Abdominal:      General: There is no distension. Palpations: Abdomen is soft. Tenderness: There is no abdominal tenderness. Musculoskeletal:         General: No swelling or tenderness. Normal range of motion. Cervical back: Normal range of motion and neck supple. No rigidity. Comments: Radial, DP, and PT pulses 2+ bilaterally. Right knee anterolateral ttp. R lumbar paraspinal ttp. No midline ttp. Skin:     General: Skin is warm and dry. Neurological:      Mental Status: She is alert and oriented to person, place, and time. Comments: Strength 5/5 and sensation grossly intact to light touch and equal bilaterally throughout all extremities          -------------------------------------------------- RESULTS -------------------------------------------------  I have personally reviewed all laboratory and imaging results for this patient. Results are listed below.      LABS:  Labs Reviewed   BASIC METABOLIC PANEL - Abnormal; Notable for the following components:       Result Value    Chloride 97 (*)     Glucose 129 (*)     Creatinine 1.3 (*)     All other components within normal limits   CBC WITH AUTO DIFFERENTIAL - Abnormal; Notable for the following components:    WBC 17.6 (*)     MCHC 31.0 (*)     Neutrophils % 86.2 (*)     Lymphocytes % 5.0 (*)     Neutrophils Absolute 15.22 (*)     Lymphocytes Absolute 0.89 (*)     Monocytes Absolute 1.23 (*)     Eosinophils Absolute 0.03 (*)     All other components within normal limits   TROPONIN - Abnormal; Notable for the following components:    Troponin, High Sensitivity 11 (*)     All other components within normal limits   URINALYSIS WITH MICROSCOPIC - Abnormal; Notable for the following components:    Bilirubin Urine SMALL (*)     Ketones, Urine TRACE (*)     Protein,  (*)     Leukocyte Esterase, Urine LARGE (*)     WBC, UA >20 (*)     Bacteria, UA MANY (*)     All other components within normal limits   COVID-19, RAPID   RAPID INFLUENZA A/B ANTIGENS   CULTURE, URINE   MAGNESIUM       RADIOLOGY:  Interpreted personally and by Radiologist.  CT Head W/O Contrast   Final Result   No acute intracranial abnormality. CT CSpine W/O Contrast   Final Result   No acute abnormality of the cervical spine. XR CHEST (2 VW)   Final Result   Chest: No acute thoracic abnormality. Pelvis and right hip: No acute bony abnormality. Right knee: Small effusion. Significant degenerative changes. No acute bony   abnormality. XR HIP 2-3 VW W PELVIS RIGHT   Final Result   Chest: No acute thoracic abnormality. Pelvis and right hip: No acute bony abnormality. Right knee: Small effusion. Significant degenerative changes. No acute bony   abnormality. XR KNEE RIGHT (1-2 VIEWS)   Final Result   Chest: No acute thoracic abnormality. Pelvis and right hip: No acute bony abnormality. Right knee: Small effusion. Significant degenerative changes. No acute bony   abnormality. EKG:  This EKG is signed and interpreted by the EP. Normal sinus rhythm, vent rate 63bpm, normal axis and intervals, nonspecific ST-T abnormality in lateral leads slightly more prominent compared w/ prior EKG      ------------------------- NURSING NOTES AND VITALS REVIEWED ---------------------------   The nursing notes within the ED encounter and vital signs as below have been reviewed by myself. BP (!) 118/59   Pulse 65   Temp 97.6 °F (36.4 °C) (Infrared)   Resp 18   LMP 10/13/2016   SpO2 98%   Oxygen Saturation Interpretation: Normal    The patients available past medical records and past encounters were reviewed.         ------------------------------ ED COURSE/MEDICAL DECISION MAKING----------------------  Medications   0.9 % sodium chloride bolus (1,000 mLs IntraVENous New Bag 1/16/23 4155)   cefTRIAXone (ROCEPHIN) 1,000 mg in sterile water 10 mL IV syringe (1,000 mg IntraVENous Given 23 6411)     Consultations:             ED pharmacist Dr. Yadi De León, agrees w/ plan to treat UTI with levofloxacin x 7d    Independent interpretation of tests:  CXR - no focal consolidation, pneumothorax, or pleural effusion   XR KNEE - no acute fx/dislocation  XR HIP - no acute fx/dislocation      Counseling: The emergency provider has spoken with the patient and discussed todays results, in addition to providing specific details for the plan of care and counseling regarding the diagnosis and prognosis. Questions are answered at this time and they are agreeable with the plan. ED Course/Medical Decision Makin y.o. female here with syncope 2d ago and mild confusion, polydipsia. Non-toxic appearing, afebrile, hemodynamically stable, and in no acute distress. Breathing comfortably on room air without respiratory distress. Neurovascularly intact throughout. EKG and troponin not c/w ACS. No dysrhythmia on EKG. Imaging negative for acute process. Workup notable for leukocytosis, UTI, will treat for pyelonephritis as patient does have some right sided back/flank ttp. No indication for inpatient management of UTI. Treated w/ IV fluids and IV abx in the ED. After discussion of findings and return precautions, patient agrees with plan for discharge and outpatient follow up with PCP. Rx levofloxacin x 7d.       --------------------------------- IMPRESSION AND DISPOSITION ---------------------------------    IMPRESSION  1. Acute cystitis without hematuria    2. Syncope and collapse    3. Dehydration        DISPOSITION  Disposition: Discharge to home  Patient condition is good    NOTE: This report was transcribed using voice recognition software.  Every effort was made to ensure accuracy; however, inadvertent computerized transcription errors may be present    Juanjo Ferro MD  Attending Emergency Physician         Juanjo Ferro MD  23 [id number]

## 2023-01-16 NOTE — ED NOTES
Department of Emergency Medicine    FIRST PROVIDER TRIAGE NOTE             Independent MLP           1/16/23  12:55 PM EST    Date of Encounter: 1/16/23   MRN: [id number]    Vitals:    01/16/23 1249   BP: (!) 142/77   Pulse: 82   Resp: 18   Temp: 97.4 °F (36.3 °C)   SpO2: 96%      HPI: Diane Ramos is a 65 y.o. female who presents to the ED for Fall (Unsure if LOC or hit head, no thinners, fall on Saturday morning, woke up on floor in kitchen. States she extremely tired  and change in hand witting   increased thirst and unsteady gate ), Flank Pain, and Knee Pain (Right knee pain)    ROS: Negative for cp or sob. Physical Exam:   Gen Appearance/Constitutional: alert  CV: regular rate     Initial Plan of Care: All treatment areas with department are currently occupied. Plan to order/Initiate the following while awaiting opening in ED: labs, EKG, and imaging studies.     Initial Plan of Care: Initiate Treatment-Testing, Proceed toTreatment Area When Bed Available for ED Attending/MLP to Continue Care    Electronically signed by Vikki White PA-C   DD: 1/16/23       Vikki White PA-C  01/16/23 4784

## 2023-01-17 ENCOUNTER — TELEPHONE (OUTPATIENT)
Dept: PHYSICAL MEDICINE AND REHAB | Age: 63
End: 2023-01-17

## 2023-01-17 NOTE — TELEPHONE ENCOUNTER
Patient called in stating that she was seen in the ER over the weekend. She is on antiitbiotics and rescheduled to 2/2.

## 2023-01-17 NOTE — ED NOTES
Pt given d/c instructions and was able to verbalize understanding. Pt ambulatory out of department.       Yolande Gardner RN  01/16/23 5750

## 2023-01-19 LAB
ORGANISM: ABNORMAL
URINE CULTURE, ROUTINE: ABNORMAL
URINE CULTURE, ROUTINE: ABNORMAL

## 2023-01-20 ENCOUNTER — TELEPHONE (OUTPATIENT)
Dept: PHYSICAL MEDICINE AND REHAB | Age: 63
End: 2023-01-20

## 2023-01-31 RX ORDER — SODIUM CHLORIDE 9 MG/ML
INJECTION, SOLUTION INTRAVENOUS PRN
Status: CANCELLED | OUTPATIENT
Start: 2023-01-31

## 2023-01-31 RX ORDER — SODIUM CHLORIDE 0.9 % (FLUSH) 0.9 %
5-40 SYRINGE (ML) INJECTION PRN
Status: CANCELLED | OUTPATIENT
Start: 2023-01-31

## 2023-01-31 RX ORDER — SODIUM CHLORIDE 0.9 % (FLUSH) 0.9 %
5-40 SYRINGE (ML) INJECTION EVERY 12 HOURS SCHEDULED
Status: CANCELLED | OUTPATIENT
Start: 2023-01-31

## 2023-02-02 ENCOUNTER — HOSPITAL ENCOUNTER (OUTPATIENT)
Dept: OPERATING ROOM | Age: 63
Setting detail: OUTPATIENT SURGERY
Discharge: HOME | End: 2023-02-02
Attending: PHYSICAL MEDICINE & REHABILITATION
Payer: COMMERCIAL

## 2023-02-02 ENCOUNTER — HOSPITAL ENCOUNTER (OUTPATIENT)
Age: 63
Setting detail: OUTPATIENT SURGERY
Discharge: HOME | End: 2023-02-02
Attending: PHYSICAL MEDICINE & REHABILITATION | Admitting: PHYSICAL MEDICINE & REHABILITATION
Payer: COMMERCIAL

## 2023-02-02 VITALS
BODY MASS INDEX: 44.2 KG/M2 | HEIGHT: 66 IN | SYSTOLIC BLOOD PRESSURE: 133 MMHG | RESPIRATION RATE: 16 BRPM | OXYGEN SATURATION: 96 % | WEIGHT: 275 LBS | HEART RATE: 59 BPM | DIASTOLIC BLOOD PRESSURE: 41 MMHG

## 2023-02-02 DIAGNOSIS — M48.061 NEUROFORAMINAL STENOSIS OF LUMBAR SPINE: ICD-10-CM

## 2023-02-02 PROBLEM — M54.16 LUMBAR RADICULITIS: Status: ACTIVE | Noted: 2023-02-02

## 2023-02-02 PROCEDURE — 64483 NJX AA&/STRD TFRM EPI L/S 1: CPT | Performed by: PHYSICAL MEDICINE & REHABILITATION

## 2023-02-02 PROCEDURE — 6360000004 HC RX CONTRAST MEDICATION: Performed by: PHYSICAL MEDICINE & REHABILITATION

## 2023-02-02 PROCEDURE — 3209999900 FLUORO FOR SURGICAL PROCEDURES

## 2023-02-02 PROCEDURE — 6360000002 HC RX W HCPCS: Performed by: PHYSICAL MEDICINE & REHABILITATION

## 2023-02-02 PROCEDURE — 7100000011 HC PHASE II RECOVERY - ADDTL 15 MIN: Performed by: PHYSICAL MEDICINE & REHABILITATION

## 2023-02-02 PROCEDURE — 2709999900 HC NON-CHARGEABLE SUPPLY: Performed by: PHYSICAL MEDICINE & REHABILITATION

## 2023-02-02 PROCEDURE — 2500000003 HC RX 250 WO HCPCS: Performed by: PHYSICAL MEDICINE & REHABILITATION

## 2023-02-02 PROCEDURE — A4216 STERILE WATER/SALINE, 10 ML: HCPCS | Performed by: PHYSICAL MEDICINE & REHABILITATION

## 2023-02-02 PROCEDURE — 7100000010 HC PHASE II RECOVERY - FIRST 15 MIN: Performed by: PHYSICAL MEDICINE & REHABILITATION

## 2023-02-02 PROCEDURE — 2580000003 HC RX 258: Performed by: PHYSICAL MEDICINE & REHABILITATION

## 2023-02-02 PROCEDURE — 3600000005 HC SURGERY LEVEL 5 BASE: Performed by: PHYSICAL MEDICINE & REHABILITATION

## 2023-02-02 RX ORDER — SODIUM CHLORIDE 0.9 % (FLUSH) 0.9 %
5-40 SYRINGE (ML) INJECTION PRN
Status: DISCONTINUED | OUTPATIENT
Start: 2023-02-02 | End: 2023-02-02 | Stop reason: HOSPADM

## 2023-02-02 RX ORDER — LIDOCAINE HYDROCHLORIDE 10 MG/ML
INJECTION, SOLUTION EPIDURAL; INFILTRATION; INTRACAUDAL; PERINEURAL PRN
Status: DISCONTINUED | OUTPATIENT
Start: 2023-02-02 | End: 2023-02-02 | Stop reason: ALTCHOICE

## 2023-02-02 RX ORDER — SODIUM CHLORIDE 0.9 % (FLUSH) 0.9 %
5-40 SYRINGE (ML) INJECTION EVERY 12 HOURS SCHEDULED
Status: DISCONTINUED | OUTPATIENT
Start: 2023-02-02 | End: 2023-02-02 | Stop reason: HOSPADM

## 2023-02-02 RX ORDER — SODIUM CHLORIDE 9 MG/ML
INJECTION, SOLUTION INTRAVENOUS PRN
Status: DISCONTINUED | OUTPATIENT
Start: 2023-02-02 | End: 2023-02-02 | Stop reason: HOSPADM

## 2023-02-02 ASSESSMENT — PAIN DESCRIPTION - DESCRIPTORS: DESCRIPTORS: ACHING

## 2023-02-02 ASSESSMENT — PAIN - FUNCTIONAL ASSESSMENT: PAIN_FUNCTIONAL_ASSESSMENT: 0-10

## 2023-02-02 NOTE — OP NOTE
LUMBOSACRAL EPIDURAL STEROID INJECTION, TRANSFORAMINAL APPROACH       WITH FLUOROSCOPIC GUIDANCE    Patient: Dulce Barrios                         MRN#: [id number]  : 1960   Date of procedure: 2023      Physician Performing Procedure:  José Miguel Francis DO    Clinical Scenario: As per electronic documentation. Diagnosis: lumbar radiculitis     Injectate: A total of 5cc, consisting of 1 cc of Dexamethasone 10mg/ml,  with the remainder of normal saline    Levels Treated:  bilateral L4-5 neural foramen    Approach:   Transforaminal    Improvement after today's procedure: As per nursing record. Comments:  none     Pre-procedural evaluation: The patient was examined today just prior to performing the procedure listed above. The patient's heart rate was normal, lungs were clear to auscultation bilaterally. Procedure: The patient was prepped and draped in a sterile fashion in the prone position after informed consent was signed and all the patient's questions were answered including the risks, benefits, alternative treatment options, and prognosis. The risks include - but are not limited to - infection, allergic reaction, increased pain, lack of therapeutic benefit, steroid reaction, nerve damage, paralysis, stroke, epidural hematoma, syncope, headache, respiratory or cardiac arrest, and scar formation. The C-arm was positioned so that an oblique view of the neural foramen as noted above was visualized. The soft tissues overlying this structure were infiltrated with 2-3 cc. of 1% Lidocaine without Epinephrine. A 22 gauge 5 inch spinal needle was inserted toward the target using a trajectory view along the fluoroscope beam.  Under AP and lateral visualization, the needle was advanced so it did not puncture dura. Biplanar projections were used to confirm position. Aspiration was confirmed to be negative for CSF and/or blood. A 1-2 cc. volume of Isovue contrast was injected at this level. The contrast was observed to flow under the pedicle. Radiographs were obtained for documentation purposes. After attaining flow of contrast documented above, the above injectate was administered into the transforaminal epidural space. The patient tolerated the procedure well and was discharged after an appropriate period of observation. If there are any complications, the patient was instructed to call us. The patient is to follow-up with the requesting physician after the injection, preferably within three weeks.     Brandon Clements DO, [facility]   Board Certified Physical Medicine and Rehabilitation

## 2023-02-02 NOTE — H&P
Allison Floyd, [address] [facility] Physical Medicine and Rehabilitation  [address] [place] [address]. [facility] [place][note 2]5 [facility]  Phone: [phone]  Fax: [phone]    PCP: Kell Cameron DO  Date of visit: 2023    CC: low back and leg pain       Kevin Mead is a 65 y.o. female who presents today for epidural steroid injection. Patient complains of low back and leg pain. No red flag symptoms. Consents to proceed with procedure. Allergies   Allergen Reactions    Mobic [Meloxicam] Itching     Burning/Itching of there scalp, palms, back and feet.      Morphine Sulfate Itching    Percocet [Oxycodone-Acetaminophen] Itching    Vicodin [Hydrocodone-Acetaminophen] Itching       Current Facility-Administered Medications   Medication Dose Route Frequency Provider Last Rate Last Admin    sodium chloride flush 0.9 % injection 5-40 mL  5-40 mL IntraVENous 2 times per day Allison Floyd, DO        sodium chloride flush 0.9 % injection 5-40 mL  5-40 mL IntraVENous PRN Lula Ann, DO        0.9 % sodium chloride infusion   IntraVENous PRN Elham Ann, DO           Past Medical History:   Diagnosis Date    Alopecia areata     Anxiety     Depression     Hyperlipidemia     Nausea & vomiting     Obesity, Class III, BMI 40-49.9 (morbid obesity) (HCC)     Osteoarthritis     PVC (premature ventricular contraction)     Sleep apnea     CPAP    Thyroid disease        Past Surgical History:   Procedure Laterality Date     SECTION      x2    CHOLECYSTECTOMY      COLONOSCOPY  14    bx done Dr Dayana Dial  2011         KNEE ARTHROSCOPY      NERVE BLOCK Bilateral 3/5/14    lumbar facet #1    NERVE BLOCK  14    paravertebral facet bilateral lumbar #2    NERVE BLOCK N/A 4 9 14    cerv #1    NERVE BLOCK N/A 2014    cervical epidural  #2    NERVE BLOCK N/A 4 23 14    cerv #3    NERVE BLOCK Bilateral 2020    intra-articular facet joint injection     OTHER SURGICAL HISTORY  07/16/14    Radiofrequency right lumbar L3-4, L4-5, L5-S1    OTHER SURGICAL HISTORY Left 08 18 2014    lumbar radiofrequency    PAIN MANAGEMENT PROCEDURE Bilateral 12/21/2020    BILATERAL INTRA-ARTICULAR FACET JOINT INJECTION WITH FLUOROSCOPIC GUIDANCE AT L3-4, L4-5, L5-S1 (CPT 29916, 04043) performed by Lula Ann DO at [facility] OR    TOTAL KNEE ARTHROPLASTY Left 4/28/2021    LEFT KNEE TOTAL KNEE ARTHROPLASTY ([facility] [employer] NEPHEW) performed by Geoff Oliveira DO at [facility] [place] OR    UPPER GASTROINTESTINAL ENDOSCOPY  12/16/14    ashly ,bx sm bowel  Dr Isaac       Family History   Problem Relation Age of Onset    Cancer Mother         breast with bone and liver mets    Bipolar Disorder Father     Other Father         Aspiration Pneumonia    Hypertension Sister     Depression Sister     Anxiety Disorder Sister     Heart Disease Brother         MI with stents    Other Brother         Frontal lobe lesion    Breast Cancer Maternal Grandmother     Heart Disease Maternal Grandfather     Heart Disease Paternal Grandfather     Cancer Other     Depression Other     Mental Illness Other        Social History     Tobacco Use    Smoking status: Never    Smokeless tobacco: Never   Vaping Use    Vaping Use: Never used   Substance Use Topics    Alcohol use: Yes     Comment: occasionally    Drug use: No              ROS:    Constitutional: Denies fevers, chills, night sweats, unintentional weight loss     Skin: Denies rash or skin changes     Respiratory: Denies SOB or cough     Cardiovascular: Denies CP, palpitations, edema      Neurologic: See HPI.     MSK: See HPI.     Hematologic/Lymphatic/Immunologic: Denies bruising       Physical Exam:   Blood pressure (!) 112/41, pulse 55, resp. rate 14, height 5' 6\" (1.676 m), weight 275 lb (124.7 kg), last menstrual period 10/13/2016, SpO2 97 %, not currently breastfeeding.   General: well developed and well nourished in no acute  distress  Resp: symmetrical chest expansion, unlabored breathing, respirations unlabored. CV: Heart rate is regular. Peripheral pulses are palpable  Skin: No rashes or ecchymosis. Normal turgor. MSK: ttp lumbar psps      Neurological Exam:  No focal sensorimotor deficits. Reflexes 2+ and symmetric.        Impression:     Lumbar radiculitis      Plan:   Injection as planned today           Brad Reyes DO, [address] [place] [address]   Board Certified Physical Medicine and Rehabilitation

## 2023-02-02 NOTE — DISCHARGE INSTRUCTIONS
Post-op instruction Block  Dr. Amrit Gaming  [phone]      Rest 12-24 hours following procedure. DO NOT DRIVE till the following day. Keep dressing clean and dry. Do not bathe, shower, or sit in hot tub the day of procedure . You may remove the dressing following A.M. You may return to work/school tomorrow. Drink extra fluids for the next 24 hours. Resume your regular diet. Resume previously prescribed medications. Resume Coumadin, Plavix, NSAIDS, Ibuprofen products 24 hours after procedure. You need to have a responsible adult stay with you this evening. If you experience any discomfort relating to this procedure, you may take Tylenol 2 tablets every 4 hrs as needed for pain and/or apply ice to the affected area. Please follow up with Dr. Amrit Gaming or Dr. Oli Hunter. If you were a hip injection follow up with your orthopedic Doctor. If you experience any unusual symptoms or problems, call your physicians answering service at [phone] or go directly to [facility]. [facility] Emergency Department. Infection After Surgery: Care Instructions  Overview  After surgery, an infection is always possible. It doesn't mean that the surgery didn't go well. Because an infection can be serious, your doctor has taken steps to manage it. Your doctor checked the infection and cleaned it if necessary. Your doctor may have made an opening in the area so that the pus can drain out. You may have gauze in the cut so that the area will stay open and keep draining. You may need antibiotics. You will need to follow up with your doctor to make sure the infection has gone away. Follow-up care is a key part of your treatment and safety. Be sure to make and go to all appointments, and call your doctor if you are having problems. It's also a good idea to know your test results and keep a list of the medicines you take. How can you care for yourself at home?   Make sure your surgeon knows about the infection, especially if you saw another doctor about your symptoms. If your doctor prescribed antibiotics, take them as directed. Do not stop taking them just because you feel better. You need to take the full course of antibiotics. Ask your doctor if you can take an over-the-counter pain medicine, such as acetaminophen (Tylenol), ibuprofen (Advil, Motrin), or naproxen (Aleve). Be safe with medicines. Read and follow all instructions on the label. Do not take two or more pain medicines at the same time unless the doctor told you to. Many pain medicines have acetaminophen, which is Tylenol. Too much acetaminophen (Tylenol) can be harmful. Prop up the area on a pillow anytime you sit or lie down during the next 3 days. Try to keep it above the level of your heart. This will help reduce swelling. Keep the skin clean and dry. You may have a bandage over the cut (incision). A bandage helps the incision heal and protects it. Your doctor will tell you how to take care of this. Keep it clean and dry. You may have drainage from the wound. If your doctor told you how to care for your incision, follow your doctor's instructions. If you did not get instructions, follow this general advice:  Wash around the incision with clean water 2 times a day. Don't use hydrogen peroxide or alcohol, which can slow healing. When should you call for help? Call your doctor now or seek immediate medical care if:    You have signs that your infection is getting worse, such as: Increased pain, swelling, warmth, or redness in the area. Red streaks leading from the area. Pus draining from the wound. A new or higher fever. Watch closely for changes in your health, and be sure to contact your doctor if you have any problems. Where can you learn more? Go to http://www.woods.com/ and enter C340 to learn more about \"Infection After Surgery: Care Instructions. \"  Current as of: January 20, 2022 Content Version: 13.5  © 6997-7654 Healthwise, Incorporated. Care instructions adapted under license by [facility] ([facility]). If you have questions about a medical condition or this instruction, always ask your healthcare professional. [address] any warranty or liability for your use of this information.

## 2023-02-16 ENCOUNTER — OFFICE VISIT (OUTPATIENT)
Dept: ORTHOPEDIC SURGERY | Age: 63
End: 2023-02-16

## 2023-02-16 VITALS — BODY MASS INDEX: 43.07 KG/M2 | TEMPERATURE: 98 F | WEIGHT: 268 LBS | HEIGHT: 66 IN

## 2023-02-16 DIAGNOSIS — M17.11 PRIMARY OSTEOARTHRITIS OF RIGHT KNEE: Primary | ICD-10-CM

## 2023-02-16 RX ORDER — TRIAMCINOLONE ACETONIDE 40 MG/ML
40 INJECTION, SUSPENSION INTRA-ARTICULAR; INTRAMUSCULAR ONCE
Status: COMPLETED | OUTPATIENT
Start: 2023-02-16 | End: 2023-02-16

## 2023-02-16 RX ADMIN — TRIAMCINOLONE ACETONIDE 40 MG: 40 INJECTION, SUSPENSION INTRA-ARTICULAR; INTRAMUSCULAR at 11:32

## 2023-02-16 NOTE — PROGRESS NOTES
Chief Complaint   Patient presents with    Knee Pain     Right knee 3 month f/u       Vale Chawla returns today for follow-up of her right knee pain. She had zilretta 3 months ago with good relief .  However today she does not have approval.     Past Medical History:   Diagnosis Date    Alopecia areata     Anxiety     Depression     Hyperlipidemia     Nausea & vomiting     Obesity, Class III, BMI 40-49.9 (morbid obesity) (HCC)     Osteoarthritis     PVC (premature ventricular contraction)     Sleep apnea     CPAP    Thyroid disease      Past Surgical History:   Procedure Laterality Date     SECTION      x2    CHOLECYSTECTOMY      COLONOSCOPY  14    bx done Dr Aiden Lugo  2011         KNEE ARTHROSCOPY      NERVE BLOCK Bilateral 3/5/14    lumbar facet #1    NERVE BLOCK  14    paravertebral facet bilateral lumbar #2    NERVE BLOCK N/A 4 9     cerv #1    NERVE BLOCK N/A 2014    cervical epidural  #2    NERVE BLOCK N/A 14    cerv #3    NERVE BLOCK Bilateral 2020    intra-articular facet joint injection     OTHER SURGICAL HISTORY  14    Radiofrequency right lumbar L3-4, L4-5, L5-S1    OTHER SURGICAL HISTORY Left 2014    lumbar radiofrequency    PAIN MANAGEMENT PROCEDURE Bilateral 2020    BILATERAL INTRA-ARTICULAR FACET JOINT INJECTION WITH FLUOROSCOPIC GUIDANCE AT L3-4, L4-5, L5-S1 (CPT W8874409, [id number]) performed by Nila Funez DO at [facility] Bilateral 2023    BILATERAL  L4-5 TRANSFORAMINAL  EPIDURAL STEROID INJECTION performed by Nila Funez DO at [facility] Left 2021    LEFT KNEE TOTAL KNEE ARTHROPLASTY [facility] NEPHEW) performed by Jack Jeffrey DO at [facility]  14    ashly ,bx sm bowel  Dr Child January       Allergies   Allergen Reactions    Mobic [Meloxicam] Itching     Burning/Itching of there scalp, palms, back and feet. Morphine Sulfate Itching    Percocet [Oxycodone-Acetaminophen] Itching    Vicodin [Hydrocodone-Acetaminophen] Itching     Social History     Socioeconomic History    Marital status:      Spouse name: Ambrosio    Number of children: 2    Years of education: 14    Highest education level: Not on file   Occupational History    Occupation: LPN   Tobacco Use    Smoking status: Never    Smokeless tobacco: Never   Vaping Use    Vaping Use: Never used   Substance and Sexual Activity    Alcohol use: Yes     Comment: occasionally    Drug use: No    Sexual activity: Yes     Partners: Male   Other Topics Concern    Not on file   Social History Narrative    Patient lives at home with . She is independent with ADL's, and does require the use of an assistive device. Social Determinants of Health     Financial Resource Strain: Low Risk     Difficulty of Paying Living Expenses: Not hard at all   Food Insecurity: No Food Insecurity    Worried About Running Out of Food in the Last Year: Never true    Ran Out of Food in the Last Year: Never true   Transportation Needs: Not on file   Physical Activity: Not on file   Stress: Not on file   Social Connections: Not on file   Intimate Partner Violence: Not on file   Housing Stability: Not on file     Family History   Problem Relation Age of Onset    Cancer Mother         breast with bone and liver mets    Bipolar Disorder Father     Other Father         Aspiration Pneumonia    Hypertension Sister     Depression Sister     Anxiety Disorder Sister     Heart Disease Brother         MI with stents    Other Brother         Frontal lobe lesion    Breast Cancer Maternal Grandmother     Heart Disease Maternal Grandfather     Heart Disease Paternal Grandfather     Cancer Other     Depression Other     Mental Illness Other        Review of Systems:     Skin: (-) rash,(-) psoriasis,(-) eczema, (-)skin cancer.    Musculoskeletal: (-) fractures,  (-) dislocations,(-) collagen vascular disease, (-) fibromyalgia, (-) multiple sclerosis, (-) muscular dystrophy, (-) RSD,(-) joint pain (-)swelling, (-) joint pain,swelling. Neurologic: (-) epilepsy, (-)seizures,(-) brain tumor,(-) TIA, (-)stroke, (-)headaches, (-)Parkinson disease,(-) memory loss, (-) LOC. Cardiovascular: (-) Chest pain, (-) swelling in legs/feet, (-) SOB, (-) cramping in legs/feet with walking. Constitutional:  The patient is alert and oriented x 3, appears to be stated age and in no distress. Temp 98 °F (36.7 °C)   Ht 5' 6\" (1.676 m)   Wt 268 lb (121.6 kg)   LMP 10/13/2016   BMI 43.26 kg/m²     Skin:  Upon inspection: the skin appears warm, dry and intact. There is not a previous scar over the affected area. There is not any cellulitis, lymphedema or cutaneous lesions noted in the lower extremities. Upon palpation there is no induration noted. Neurologic:  Gait: normal;  Motor exam of the lower extremities show ; quadriceps, hamstrings, foot dorsi and plantar flexors intact R.  5/5 and L. 5/5. Deep tendon reflexes are 2/4 at the knees and 2/4 at the ankles with strong extensor hallicus longus motor strength bilaterally. Sensory to both feet is intact to all sensory roots. Cardiovascular: The vascular exam is normal and is well perfused to distal extremities. Distal pulses DP/PT: R. 2+; L. 2+. There is cap refill noted less than two seconds in all digits. There is not edema of the bilateral lower extremities. There is not varicosities noted in the distal extremities. Lymph:  Upon palpation,  there is no lymphadenopathy noted in bilateral lower extremities. Musculoskeletal:  Gait: normal; examination of the nails and digits reveal no cyanosis or clubbing    Lumbar exam:  On visual inspection, there is no deformity of the spine. full range of motion, no tenderness, palpable spasm or pain on motion. Special tests: Straight Leg Raise negative, Albino testnegative.     Hip exam:  Upon inspection, there is no deformity noted. Upon palpation there is not tenderness. ROM: is   full and semetrical.   Strength: Hip Flexors 5/5; Hip Abductors 5/5; Hip Adduction 5/5. Knee exam:  Left knee exam shows;  range of motion of R. Knee is 0 to 115, and L. Knee is 0 to 120. She does have  pain on motion, effusion is mild, there is not tenderness over the  global region, there are not any masses, there is not ligamentous instability, there is not  deformity noted. Knee exam: right positive for moderate crepitations, some mild tenderness laxity is not noted with  stress. R. Knee:  Lachman's negative, Anterior Drawer negative, Posterior Drawer negative  Gloria's negative, Thallasy  negative,   PF grind test negative, Apprehension test negative, Patellar J sign  negative  L. Knee:  Lachman's negative, Anterior Drawer negative, Posterior Drawer negative  Gloria's negative, Thallasy  negative,   PF grind test negative, Apprehension test negative,  Patellar J sign  negative    Xrays:   S/p knee arthroplasty with no signs of loosening    MRI:    n/a  Radiographic findings reviewed with patient    Impression:  Encounter Diagnosis   Name Primary? Primary osteoarthritis of right knee Yes       Plan:   Natural history and expected course discussed. Questions answered. Educational materials distributed. Rest, ice, compression, and elevation (RICE) therapy. Reduction in offending activity. I had a lengthy discussion with the patient regarding their diagnosis. I explained treatment options including surgical vs non surgical treatment. I reviewed in detail the risks and benefits and outlined the procedure in detail with expected outcomes and possible complications. I also discussed non surgical treatment such as injections (CSI and visco supplementation), physical therapy, topical creams and NSAID's. They have elected for conservative management at this time.     I will proceed with a cortisone injection in the Right knee. Verbal and written consent was obtained for the injections. The skin was prepped with alcohol. 1mL of Kenalog 40mg and 9mL of 0.25% Marcaine was  injected to Right knee. The injection was given through the lateral side of the knee. The patient tolerated the injection well. I will see the patient back in 3 months     The patient has failed conservative measures such as NSAIDS, HEP, and cortisone injections. She is an excellent candidate for Zilretta injections  in the Right knee.  We will contact the patient's insurance company and see them back in the office once we have received approval.

## 2023-03-27 DIAGNOSIS — M25.511 RIGHT SHOULDER PAIN, UNSPECIFIED CHRONICITY: Primary | ICD-10-CM

## 2023-03-30 ENCOUNTER — OFFICE VISIT (OUTPATIENT)
Dept: ORTHOPEDIC SURGERY | Age: 63
End: 2023-03-30

## 2023-03-30 VITALS — WEIGHT: 268 LBS | TEMPERATURE: 98 F | HEIGHT: 66 IN | BODY MASS INDEX: 43.07 KG/M2

## 2023-03-30 DIAGNOSIS — M25.811 SHOULDER IMPINGEMENT, RIGHT: Primary | ICD-10-CM

## 2023-03-30 DIAGNOSIS — M75.51 BURSITIS OF RIGHT SHOULDER: ICD-10-CM

## 2023-03-30 RX ORDER — TRIAMCINOLONE ACETONIDE 40 MG/ML
40 INJECTION, SUSPENSION INTRA-ARTICULAR; INTRAMUSCULAR ONCE
Status: COMPLETED | OUTPATIENT
Start: 2023-03-30 | End: 2023-03-30

## 2023-03-30 RX ADMIN — TRIAMCINOLONE ACETONIDE 40 MG: 40 INJECTION, SUSPENSION INTRA-ARTICULAR; INTRAMUSCULAR at 09:22

## 2023-03-30 NOTE — PROGRESS NOTES
Allergen Reactions    Mobic [Meloxicam] Itching     Burning/Itching of there scalp, palms, back and feet. Morphine Sulfate Itching    Percocet [Oxycodone-Acetaminophen] Itching    Vicodin [Hydrocodone-Acetaminophen] Itching     Social History     Socioeconomic History    Marital status:      Spouse name: Ambrosio    Number of children: 2    Years of education: 14    Highest education level: Not on file   Occupational History    Occupation: LPN   Tobacco Use    Smoking status: Never    Smokeless tobacco: Never   Vaping Use    Vaping Use: Never used   Substance and Sexual Activity    Alcohol use: Yes     Comment: occasionally    Drug use: No    Sexual activity: Yes     Partners: Male   Other Topics Concern    Not on file   Social History Narrative    Patient lives at home with . She is independent with ADL's, and does require the use of an assistive device.       Social Determinants of Health     Financial Resource Strain: Low Risk     Difficulty of Paying Living Expenses: Not hard at all   Food Insecurity: No Food Insecurity    Worried About Running Out of Food in the Last Year: Never true    Ran Out of Food in the Last Year: Never true   Transportation Needs: Not on file   Physical Activity: Not on file   Stress: Not on file   Social Connections: Not on file   Intimate Partner Violence: Not on file   Housing Stability: Not on file     Family History   Problem Relation Age of Onset    Cancer Mother         breast with bone and liver mets    Bipolar Disorder Father     Other Father         Aspiration Pneumonia    Hypertension Sister     Depression Sister     Anxiety Disorder Sister     Heart Disease Brother         MI with stents    Other Brother         Frontal lobe lesion    Breast Cancer Maternal Grandmother     Heart Disease Maternal Grandfather     Heart Disease Paternal Grandfather     Cancer Other     Depression Other     Mental Illness Other        REVIEW OF SYSTEMS:     General/Constitution:

## 2023-04-03 RX ORDER — DICLOFENAC SODIUM 75 MG/1
TABLET, DELAYED RELEASE ORAL
Qty: 60 TABLET | Refills: 3 | Status: SHIPPED | OUTPATIENT
Start: 2023-04-03

## 2023-04-27 PROBLEM — F33.42 RECURRENT MAJOR DEPRESSIVE DISORDER, IN FULL REMISSION (HCC): Status: ACTIVE | Noted: 2023-04-27

## 2023-05-12 ENCOUNTER — ANESTHESIA EVENT (OUTPATIENT)
Dept: OPERATING ROOM | Age: 63
End: 2023-05-12
Payer: COMMERCIAL

## 2023-05-15 ENCOUNTER — HOSPITAL ENCOUNTER (OUTPATIENT)
Age: 63
Setting detail: OUTPATIENT SURGERY
Discharge: HOME | End: 2023-05-15
Attending: ORTHOPAEDIC SURGERY | Admitting: ORTHOPAEDIC SURGERY
Payer: COMMERCIAL

## 2023-05-15 ENCOUNTER — ANESTHESIA (OUTPATIENT)
Dept: OPERATING ROOM | Age: 63
End: 2023-05-15
Payer: COMMERCIAL

## 2023-05-15 VITALS
SYSTOLIC BLOOD PRESSURE: 146 MMHG | BODY MASS INDEX: 43.07 KG/M2 | OXYGEN SATURATION: 98 % | HEIGHT: 66 IN | TEMPERATURE: 98 F | HEART RATE: 61 BPM | RESPIRATION RATE: 18 BRPM | WEIGHT: 268 LBS | DIASTOLIC BLOOD PRESSURE: 62 MMHG

## 2023-05-15 DIAGNOSIS — G89.18 POSTOPERATIVE PAIN: Primary | ICD-10-CM

## 2023-05-15 PROCEDURE — 6360000002 HC RX W HCPCS

## 2023-05-15 PROCEDURE — 3700000001 HC ADD 15 MINUTES (ANESTHESIA): Performed by: ORTHOPAEDIC SURGERY

## 2023-05-15 PROCEDURE — 2500000003 HC RX 250 WO HCPCS: Performed by: ANESTHESIOLOGY

## 2023-05-15 PROCEDURE — 2709999900 HC NON-CHARGEABLE SUPPLY: Performed by: ORTHOPAEDIC SURGERY

## 2023-05-15 PROCEDURE — 3600000002 HC SURGERY LEVEL 2 BASE: Performed by: ORTHOPAEDIC SURGERY

## 2023-05-15 PROCEDURE — 3700000000 HC ANESTHESIA ATTENDED CARE: Performed by: ORTHOPAEDIC SURGERY

## 2023-05-15 PROCEDURE — 3600000012 HC SURGERY LEVEL 2 ADDTL 15MIN: Performed by: ORTHOPAEDIC SURGERY

## 2023-05-15 PROCEDURE — 2500000003 HC RX 250 WO HCPCS: Performed by: ORTHOPAEDIC SURGERY

## 2023-05-15 PROCEDURE — 2580000003 HC RX 258

## 2023-05-15 PROCEDURE — 2500000003 HC RX 250 WO HCPCS

## 2023-05-15 PROCEDURE — 2580000003 HC RX 258: Performed by: ANESTHESIOLOGY

## 2023-05-15 PROCEDURE — 6360000002 HC RX W HCPCS: Performed by: ANESTHESIOLOGY

## 2023-05-15 PROCEDURE — 7100000010 HC PHASE II RECOVERY - FIRST 15 MIN: Performed by: ORTHOPAEDIC SURGERY

## 2023-05-15 PROCEDURE — A4216 STERILE WATER/SALINE, 10 ML: HCPCS | Performed by: ANESTHESIOLOGY

## 2023-05-15 PROCEDURE — 7100000011 HC PHASE II RECOVERY - ADDTL 15 MIN: Performed by: ORTHOPAEDIC SURGERY

## 2023-05-15 RX ORDER — ONDANSETRON 2 MG/ML
INJECTION INTRAMUSCULAR; INTRAVENOUS PRN
Status: DISCONTINUED | OUTPATIENT
Start: 2023-05-15 | End: 2023-05-15 | Stop reason: SDUPTHER

## 2023-05-15 RX ORDER — SODIUM CHLORIDE 9 MG/ML
INJECTION, SOLUTION INTRAVENOUS CONTINUOUS PRN
Status: DISCONTINUED | OUTPATIENT
Start: 2023-05-15 | End: 2023-05-15 | Stop reason: SDUPTHER

## 2023-05-15 RX ORDER — FENTANYL CITRATE 50 UG/ML
25 INJECTION, SOLUTION INTRAMUSCULAR; INTRAVENOUS EVERY 5 MIN PRN
Status: CANCELLED | OUTPATIENT
Start: 2023-05-15

## 2023-05-15 RX ORDER — FENTANYL CITRATE 50 UG/ML
INJECTION, SOLUTION INTRAMUSCULAR; INTRAVENOUS PRN
Status: DISCONTINUED | OUTPATIENT
Start: 2023-05-15 | End: 2023-05-15 | Stop reason: SDUPTHER

## 2023-05-15 RX ORDER — SODIUM CHLORIDE 0.9 % (FLUSH) 0.9 %
5-40 SYRINGE (ML) INJECTION PRN
Status: CANCELLED | OUTPATIENT
Start: 2023-05-15

## 2023-05-15 RX ORDER — DIPHENHYDRAMINE HYDROCHLORIDE 50 MG/ML
12.5 INJECTION INTRAMUSCULAR; INTRAVENOUS
Status: CANCELLED | OUTPATIENT
Start: 2023-05-15 | End: 2023-05-16

## 2023-05-15 RX ORDER — HYDRALAZINE HYDROCHLORIDE 20 MG/ML
5 INJECTION INTRAMUSCULAR; INTRAVENOUS
Status: CANCELLED | OUTPATIENT
Start: 2023-05-15

## 2023-05-15 RX ORDER — SODIUM CHLORIDE 0.9 % (FLUSH) 0.9 %
5-40 SYRINGE (ML) INJECTION EVERY 12 HOURS SCHEDULED
Status: CANCELLED | OUTPATIENT
Start: 2023-05-15

## 2023-05-15 RX ORDER — KETAMINE HYDROCHLORIDE 10 MG/ML
INJECTION INTRAMUSCULAR; INTRAVENOUS PRN
Status: DISCONTINUED | OUTPATIENT
Start: 2023-05-15 | End: 2023-05-15 | Stop reason: SDUPTHER

## 2023-05-15 RX ORDER — LABETALOL HYDROCHLORIDE 5 MG/ML
5 INJECTION, SOLUTION INTRAVENOUS
Status: CANCELLED | OUTPATIENT
Start: 2023-05-15

## 2023-05-15 RX ORDER — LIDOCAINE HYDROCHLORIDE 20 MG/ML
INJECTION, SOLUTION EPIDURAL; INFILTRATION; INTRACAUDAL; PERINEURAL PRN
Status: DISCONTINUED | OUTPATIENT
Start: 2023-05-15 | End: 2023-05-15 | Stop reason: SDUPTHER

## 2023-05-15 RX ORDER — PROCHLORPERAZINE EDISYLATE 5 MG/ML
5 INJECTION INTRAMUSCULAR; INTRAVENOUS
Status: CANCELLED | OUTPATIENT
Start: 2023-05-15 | End: 2023-05-16

## 2023-05-15 RX ORDER — SODIUM CHLORIDE 9 MG/ML
INJECTION, SOLUTION INTRAVENOUS PRN
Status: CANCELLED | OUTPATIENT
Start: 2023-05-15

## 2023-05-15 RX ORDER — PROPOFOL 10 MG/ML
INJECTION, EMULSION INTRAVENOUS CONTINUOUS PRN
Status: DISCONTINUED | OUTPATIENT
Start: 2023-05-15 | End: 2023-05-15 | Stop reason: SDUPTHER

## 2023-05-15 RX ORDER — MIDAZOLAM HYDROCHLORIDE 1 MG/ML
INJECTION INTRAMUSCULAR; INTRAVENOUS PRN
Status: DISCONTINUED | OUTPATIENT
Start: 2023-05-15 | End: 2023-05-15 | Stop reason: SDUPTHER

## 2023-05-15 RX ORDER — METOCLOPRAMIDE HYDROCHLORIDE 5 MG/ML
10 INJECTION INTRAMUSCULAR; INTRAVENOUS ONCE
Status: COMPLETED | OUTPATIENT
Start: 2023-05-15 | End: 2023-05-15

## 2023-05-15 RX ORDER — SODIUM CHLORIDE, SODIUM LACTATE, POTASSIUM CHLORIDE, CALCIUM CHLORIDE 600; 310; 30; 20 MG/100ML; MG/100ML; MG/100ML; MG/100ML
INJECTION, SOLUTION INTRAVENOUS CONTINUOUS PRN
Status: DISCONTINUED | OUTPATIENT
Start: 2023-05-15 | End: 2023-05-15 | Stop reason: SDUPTHER

## 2023-05-15 RX ORDER — DEXAMETHASONE SODIUM PHOSPHATE 4 MG/ML
INJECTION, SOLUTION INTRA-ARTICULAR; INTRALESIONAL; INTRAMUSCULAR; INTRAVENOUS; SOFT TISSUE PRN
Status: DISCONTINUED | OUTPATIENT
Start: 2023-05-15 | End: 2023-05-15 | Stop reason: SDUPTHER

## 2023-05-15 RX ORDER — MEPERIDINE HYDROCHLORIDE 25 MG/ML
12.5 INJECTION INTRAMUSCULAR; INTRAVENOUS; SUBCUTANEOUS ONCE
Status: CANCELLED | OUTPATIENT
Start: 2023-05-15 | End: 2023-05-15

## 2023-05-15 RX ORDER — GLYCOPYRROLATE 0.2 MG/ML
INJECTION INTRAMUSCULAR; INTRAVENOUS PRN
Status: DISCONTINUED | OUTPATIENT
Start: 2023-05-15 | End: 2023-05-15 | Stop reason: SDUPTHER

## 2023-05-15 RX ADMIN — GLYCOPYRROLATE 0.2 MG: 0.2 INJECTION INTRAMUSCULAR; INTRAVENOUS at 14:05

## 2023-05-15 RX ADMIN — FENTANYL CITRATE 50 MCG: 50 INJECTION, SOLUTION INTRAMUSCULAR; INTRAVENOUS at 14:13

## 2023-05-15 RX ADMIN — DEXAMETHASONE SODIUM PHOSPHATE 10 MG: 4 INJECTION, SOLUTION INTRAMUSCULAR; INTRAVENOUS at 14:02

## 2023-05-15 RX ADMIN — KETAMINE HYDROCHLORIDE 25 MG: 10 INJECTION INTRAMUSCULAR; INTRAVENOUS at 14:08

## 2023-05-15 RX ADMIN — SODIUM CHLORIDE: 9 INJECTION, SOLUTION INTRAVENOUS at 13:45

## 2023-05-15 RX ADMIN — KETAMINE HYDROCHLORIDE 25 MG: 10 INJECTION INTRAMUSCULAR; INTRAVENOUS at 14:06

## 2023-05-15 RX ADMIN — LIDOCAINE HYDROCHLORIDE 100 MG: 20 INJECTION, SOLUTION EPIDURAL; INFILTRATION; INTRACAUDAL; PERINEURAL at 14:02

## 2023-05-15 RX ADMIN — FENTANYL CITRATE 25 MCG: 50 INJECTION, SOLUTION INTRAMUSCULAR; INTRAVENOUS at 14:09

## 2023-05-15 RX ADMIN — SODIUM CHLORIDE, POTASSIUM CHLORIDE, SODIUM LACTATE AND CALCIUM CHLORIDE: 600; 310; 30; 20 INJECTION, SOLUTION INTRAVENOUS at 14:33

## 2023-05-15 RX ADMIN — PROPOFOL 75 MCG/KG/MIN: 10 INJECTION, EMULSION INTRAVENOUS at 14:02

## 2023-05-15 RX ADMIN — METOCLOPRAMIDE 10 MG: 5 INJECTION, SOLUTION INTRAMUSCULAR; INTRAVENOUS at 11:49

## 2023-05-15 RX ADMIN — ONDANSETRON 4 MG: 2 INJECTION INTRAMUSCULAR; INTRAVENOUS at 14:18

## 2023-05-15 RX ADMIN — MIDAZOLAM 2 MG: 1 INJECTION INTRAMUSCULAR; INTRAVENOUS at 13:59

## 2023-05-15 RX ADMIN — FAMOTIDINE 20 MG: 10 INJECTION, SOLUTION INTRAVENOUS at 11:49

## 2023-05-15 RX ADMIN — FENTANYL CITRATE 25 MCG: 50 INJECTION, SOLUTION INTRAMUSCULAR; INTRAVENOUS at 14:02

## 2023-05-15 ASSESSMENT — PAIN SCALES - GENERAL
PAINLEVEL_OUTOF10: 0

## 2023-05-15 ASSESSMENT — PAIN DESCRIPTION - DESCRIPTORS: DESCRIPTORS: ACHING

## 2023-05-15 ASSESSMENT — PAIN DESCRIPTION - PAIN TYPE
TYPE: SURGICAL PAIN

## 2023-05-15 ASSESSMENT — LIFESTYLE VARIABLES: SMOKING_STATUS: 0

## 2023-05-15 ASSESSMENT — PAIN - FUNCTIONAL ASSESSMENT
PAIN_FUNCTIONAL_ASSESSMENT: 0-10
PAIN_FUNCTIONAL_ASSESSMENT: ACTIVITIES ARE NOT PREVENTED
PAIN_FUNCTIONAL_ASSESSMENT: ACTIVITIES ARE NOT PREVENTED

## 2023-05-15 ASSESSMENT — PAIN DESCRIPTION - LOCATION
LOCATION: WRIST

## 2023-05-15 ASSESSMENT — PAIN DESCRIPTION - ORIENTATION
ORIENTATION: RIGHT

## 2023-05-15 NOTE — DISCHARGE INSTRUCTIONS
F/U with me in 7-10 days    Call [phone] for appointment   Elevate extremity  Keep dressing clean and dry  Do not remove dressing

## 2023-05-15 NOTE — ANESTHESIA POSTPROCEDURE EVALUATION
Department of Anesthesiology  Postprocedure Note    Patient: Suze Massey  MRN: [id number]  YOB: 1960  Date of evaluation: 5/15/2023      Procedure Summary     Date: 05/15/23 Room / Location: [facility] 07 / 106 [facility]    Anesthesia Start: 1359 Anesthesia Stop: 9500    Procedure: RIGHT CARPAL TUNNEL RELEASE (Right: Hand) Diagnosis:       Carpal tunnel syndrome on right      (Carpal tunnel syndrome on right [G56.01])    Surgeons: Vicky Schwartz MD Responsible Provider: Yusuf Carr DO    Anesthesia Type: MAC ASA Status: 3          Anesthesia Type: No value filed.     Surendra Phase I: Surendra Score: 10    Usrendra Phase II: Surendra Score: 10      Anesthesia Post Evaluation    Level of consciousness: awake  Pain score: 3  Airway patency: patent  Nausea & Vomiting: no vomiting and no nausea  Complications: no  Cardiovascular status: hemodynamically stable  Respiratory status: acceptable  Hydration status: stable

## 2023-05-15 NOTE — H&P
Department of Orthopedic Surgery  Attending History and Physical        CHIEF COMPLAINT:  numbness    Reason for Admission:  OR    History Obtained From:  patient    HISTORY OF PRESENT ILLNESS:      The patient is a 65 y.o. female with significant past medical history of numbness right arm  who presents with failure to improve with conservative measures    Past Medical History:        Diagnosis Date    Alopecia areata     Anxiety     Carpal tunnel syndrome     Depression     Hyperlipidemia     Nausea & vomiting     Obesity, Class III, BMI 40-49.9 (morbid obesity) (HCC)     Osteoarthritis     PONV (postoperative nausea and vomiting)     PVC (premature ventricular contraction)     Sleep apnea     CPAP    Thyroid disease      Past Surgical History:        Procedure Laterality Date     SECTION      x2    CHOLECYSTECTOMY      COLONOSCOPY  14    bx done Dr Monse Gonzales  2011         KNEE ARTHROSCOPY      NERVE BLOCK Bilateral 3/5/14    lumbar facet #1    NERVE BLOCK  14    paravertebral facet bilateral lumbar #2    NERVE BLOCK N/A 4 9 14    cerv #1    NERVE BLOCK N/A 2014    cervical epidural  #2    NERVE BLOCK N/A  14    cerv #3    NERVE BLOCK Bilateral 2020    intra-articular facet joint injection     OTHER SURGICAL HISTORY  14    Radiofrequency right lumbar L3-4, L4-5, L5-S1    OTHER SURGICAL HISTORY Left 2014    lumbar radiofrequency    PAIN MANAGEMENT PROCEDURE Bilateral 2020    BILATERAL INTRA-ARTICULAR FACET JOINT INJECTION WITH FLUOROSCOPIC GUIDANCE AT L3-4, L4-5, L5-S1 (CPT [id number], [id number]) performed by Jia Tanner DO at [address] Bilateral 2023    BILATERAL  L4-5 TRANSFORAMINAL  EPIDURAL STEROID INJECTION performed by Jia Tanner DO at [facility] Left 2021    LEFT KNEE TOTAL KNEE ARTHROPLASTY [facility]) performed by Lady Hart

## 2023-05-15 NOTE — BRIEF OP NOTE
Brief Postoperative Note      Patient: Alesha Buchanan  YOB: 1960  MRN: [id number]    Date of Procedure: 5/15/2023    Pre-Op Diagnosis Codes:     * Carpal tunnel syndrome on right [G56.01]    Post-Op Diagnosis: Same       Procedure(s):  RIGHT CARPAL TUNNEL RELEASE    Surgeon(s):  Stone Jeffrey MD    Assistant:  * No surgical staff found *    Anesthesia: Monitor Anesthesia Care    Estimated Blood Loss (mL): Minimal    Complications: None    Specimens:   * No specimens in log *    Implants:  * No implants in log *      Drains: * No LDAs found *    Findings: compressed nerve      Electronically signed by Stone Jeffrey MD on 5/15/2023 at 2:42 PM

## 2023-05-15 NOTE — PROGRESS NOTES
1445 admitted to stage 2 pacu     1450   Pt taking po well  here   1510 discharge instructions reviewed with pt and her  and they verbalized understanding of instructions

## 2023-05-15 NOTE — ANESTHESIA PRE PROCEDURE
Osteoarthritis     PONV (postoperative nausea and vomiting)     PVC (premature ventricular contraction)     Sleep apnea     CPAP    Thyroid disease        Past Surgical History:        Procedure Laterality Date     SECTION      x2    CHOLECYSTECTOMY      COLONOSCOPY  14    bx done Dr Dorinda Mcrae  2011         KNEE ARTHROSCOPY      NERVE BLOCK Bilateral 3/5/14    lumbar facet #1    NERVE BLOCK  14    paravertebral facet bilateral lumbar #2    NERVE BLOCK N/A 4 9 14    cerv #1    NERVE BLOCK N/A 2014    cervical epidural  #2    NERVE BLOCK N/A 4 23 14    cerv #3    NERVE BLOCK Bilateral 2020    intra-articular facet joint injection     OTHER SURGICAL HISTORY  14    Radiofrequency right lumbar L3-4, L4-5, L5-S1    OTHER SURGICAL HISTORY Left 2014    lumbar radiofrequency    PAIN MANAGEMENT PROCEDURE Bilateral 2020    BILATERAL INTRA-ARTICULAR FACET JOINT INJECTION WITH FLUOROSCOPIC GUIDANCE AT L3-4, L4-5, L5-S1 (CPT 01488, 37939) performed by Schetrev-DO Jae at [address] Bilateral 2023    BILATERAL  L4-5 TRANSFORAMINAL  EPIDURAL STEROID INJECTION performed by Nicholas-Pltiffanie DO at [address] Left 2021    LEFT KNEE TOTAL KNEE ARTHROPLASTY [facility]) performed by Amina Barclay DO at [address]  14    ashly ,bx sm bowel  Dr Augustina Hardwick History:    Social History     Tobacco Use    Smoking status: Never    Smokeless tobacco: Never   Substance Use Topics    Alcohol use: Yes     Comment: occasionally                                Counseling given: Not Answered      Vital Signs (Current):   Vitals:    05/10/23 1012   Weight: 268 lb (121.6 kg)   Height: 5' 6\" (1.676 m)                                              BP Readings from Last 3 Encounters:   23 (!) 119/59

## 2023-05-16 NOTE — OP NOTE
[address] [address]                                OPERATIVE REPORT    PATIENT NAME: Terrell Hameed                    :        1960  MED REC NO:   [id number]                            ROOM:  ACCOUNT NO:   [de-identified]                           ADMIT DATE: 05/15/2023  PROVIDER:     Dragan Emrey MD    DATE OF PROCEDURE:  05/15/2023    LOCATION:  [address] [place] [address]. PREOPERATIVE DIAGNOSIS:  Right carpal tunnel syndrome. POSTOPERATIVE DIAGNOSIS:  Right carpal tunnel syndrome. PROCEDURE PERFORMED:  Right carpal tunnel release. SURGEON:  Dragan Emery MD.    ASSISTANT:  None. ANESTHESIA:  Local with sedation. COMPLICATIONS:  None. ESTIMATED BLOOD LOSS:  Minimal.    TOURNIQUET TIME:  19 minutes. INDICATIONS:  This is a 72-year-old female with objective and  electrophysiologic evidence of ongoing compression of the median nerve  at the level of the wrist, who has failed conservative measures and  after lengthy discussion of risks, benefits, and treatment alternatives,  agreed to operative intervention. Operative findings include  significant compression of the median nerve at the level of carpal canal  and injection upon release. DESCRIPTION OF PROCEDURE:  The patient was brought to the OR and placed  in supine position. After adequate sedation, local block given with  0.5% Marcaine and 1% lidocaine over the area of the carpal canal.  The  hand was then prepped and draped in sterile manner. Arm exsanguinated  with Esmarch. Tourniquet inflated to 350 mmHg. A longitudinal incision  created in line with the radial side of the ring ray, carried down to  the skin and subcutaneous fat to the level of the flexor retinaculum. Flexor retinaculum was then divided under direct visualization from  distally to the superficial arch to proximally through the antebrachial  fascia.   Nerve

## 2023-05-18 ENCOUNTER — OFFICE VISIT (OUTPATIENT)
Dept: ORTHOPEDIC SURGERY | Age: 63
End: 2023-05-18

## 2023-05-18 ENCOUNTER — PREP FOR PROCEDURE (OUTPATIENT)
Dept: ORTHOPEDIC SURGERY | Age: 63
End: 2023-05-18

## 2023-05-18 ENCOUNTER — TELEPHONE (OUTPATIENT)
Dept: ORTHOPEDIC SURGERY | Age: 63
End: 2023-05-18

## 2023-05-18 VITALS — WEIGHT: 268 LBS | HEIGHT: 66 IN | TEMPERATURE: 98 F | BODY MASS INDEX: 43.07 KG/M2

## 2023-05-18 DIAGNOSIS — M17.11 PRIMARY OSTEOARTHRITIS OF RIGHT KNEE: ICD-10-CM

## 2023-05-18 DIAGNOSIS — Z96.652 S/P TOTAL KNEE ARTHROPLASTY, LEFT: Primary | ICD-10-CM

## 2023-05-18 NOTE — TELEPHONE ENCOUNTER
Prior Authorization Form:      DEMOGRAPHICS:                     Patient Name:  Kathren Hammans  Patient :  1960            Insurance:  Payor: Vijay Poole / Plan: [employer] [facility] [address] / Product Type: *No Product type* /   Insurance ID Number:    Payer/Plan Subscr  Sex Relation Sub. Ins. ID Effective Group Num   1.  [address] 1960 Female Self [id number]                                    [id number]         DIAGNOSIS & PROCEDURE:                       Procedure/Operation: LEFT KNEE ARTHROSCOPY LYSIS OF ADHESIONS WITH SYNOVECTOMY           CPT Code: 49092; 92506    Diagnosis:  LEFT KNEE S/P TOTAL KNEE ARTHROPLASTY    ICD10 Code: Q97.960    Location:  [facility]    Surgeon:  CHRISTEL ALLISON    SCHEDULING INFORMATION:                          Date: 23    Time: TO FOLLOW              Anesthesia:  General                                                       Status:  Outpatient        Special Comments:  NONE       Electronically signed by Celia Gregorio ATC on 2023 at 1:26 PM

## 2023-05-19 RX ORDER — TRAMADOL HYDROCHLORIDE 50 MG/1
50 TABLET ORAL EVERY 8 HOURS PRN
COMMUNITY

## 2023-05-22 RX ORDER — TRIAMCINOLONE ACETONIDE 40 MG/ML
40 INJECTION, SUSPENSION INTRA-ARTICULAR; INTRAMUSCULAR ONCE
Status: COMPLETED | OUTPATIENT
Start: 2023-05-22 | End: 2023-05-22

## 2023-05-22 RX ADMIN — TRIAMCINOLONE ACETONIDE 40 MG: 40 INJECTION, SUSPENSION INTRA-ARTICULAR; INTRAMUSCULAR at 09:11

## 2023-05-25 ENCOUNTER — ANESTHESIA EVENT (OUTPATIENT)
Dept: OPERATING ROOM | Age: 63
End: 2023-05-25
Payer: COMMERCIAL

## 2023-05-25 ASSESSMENT — LIFESTYLE VARIABLES: SMOKING_STATUS: 0

## 2023-05-25 NOTE — ANESTHESIA PRE PROCEDURE
Department of Anesthesiology  Preprocedure Note       Name:  Ayla De La Garza   Age:  65 y.o.  :  1960                                          MRN:  [id number]         Date:  2023      Surgeon: Nicolasa Ho): Alla Fuentes DO    Procedure: Procedure(s):  LEFT KNEE ARTHROSCOPY LYSIS OF ADEHSIONS WITH SYNOVECTOMY-23    Medications prior to admission:    Current medications: Allergies: Allergies   Allergen Reactions    Mobic [Meloxicam] Itching     Burning/Itching of there scalp, palms, back and feet.  Morphine Sulfate Itching    Percocet [Oxycodone-Acetaminophen] Itching    Vicodin [Hydrocodone-Acetaminophen] Itching       Problem List:    Patient Active Problem List   Diagnosis Code    Mechanical low back pain M54.59    DDD (degenerative disc disease), lumbar M51.36    Facet syndrome, lumbar M47.816    Sacroiliitis, right M46.1    Protruded lumbar disc M51.26    degenerative Osteoarthritis of cervical spine M47.812    Cervical facet syndrome M47.812    Cervical radiculopathy M54.12    Protruded cervical disc M50.20    Shoulder impingement M25.819    GERD (gastroesophageal reflux disease) K21.9    Thyroid disease E07.9    Sleep apnea G47.30    PVC (premature ventricular contraction) I49.3    Obesity, Class III, BMI 40-49.9 (morbid obesity) ([facility]) E66.01    Hyperlipidemia E78.5    Depression F32. A    Left Achilles tendinitis M76.62    Primary osteoarthritis of one knee, left M17.12    S/P knee replacement Z96.659    Status post total left knee replacement Z96.652    Primary osteoarthritis of left knee M17.12    S/P total knee arthroplasty, left K54.325    Lumbar radiculitis M54.16    Recurrent major depressive disorder, in full remission ([address] F33.42    Postoperative pain G89.18       Past Medical History:        Diagnosis Date    Alopecia areata     Anxiety     Carpal tunnel syndrome     Depression     GERD (gastroesophageal reflux disease)    

## 2023-05-26 ENCOUNTER — ANESTHESIA (OUTPATIENT)
Dept: OPERATING ROOM | Age: 63
End: 2023-05-26
Payer: COMMERCIAL

## 2023-05-26 ENCOUNTER — HOSPITAL ENCOUNTER (OUTPATIENT)
Age: 63
Setting detail: OUTPATIENT SURGERY
Discharge: HOME | End: 2023-05-26
Attending: ORTHOPAEDIC SURGERY | Admitting: ORTHOPAEDIC SURGERY
Payer: COMMERCIAL

## 2023-05-26 VITALS
SYSTOLIC BLOOD PRESSURE: 129 MMHG | TEMPERATURE: 97.2 F | HEIGHT: 66 IN | DIASTOLIC BLOOD PRESSURE: 54 MMHG | RESPIRATION RATE: 16 BRPM | HEART RATE: 54 BPM | BODY MASS INDEX: 44.36 KG/M2 | OXYGEN SATURATION: 96 % | WEIGHT: 276 LBS

## 2023-05-26 DIAGNOSIS — M65.9 SYNOVITIS OF LEFT KNEE: Primary | ICD-10-CM

## 2023-05-26 PROBLEM — M65.962 SYNOVITIS OF LEFT KNEE: Status: ACTIVE | Noted: 2023-05-26

## 2023-05-26 PROCEDURE — 2580000003 HC RX 258: Performed by: ANESTHESIOLOGY

## 2023-05-26 PROCEDURE — 6360000002 HC RX W HCPCS

## 2023-05-26 PROCEDURE — 2500000003 HC RX 250 WO HCPCS

## 2023-05-26 PROCEDURE — 2580000003 HC RX 258: Performed by: ORTHOPAEDIC SURGERY

## 2023-05-26 PROCEDURE — 7100000000 HC PACU RECOVERY - FIRST 15 MIN: Performed by: ORTHOPAEDIC SURGERY

## 2023-05-26 PROCEDURE — 6360000002 HC RX W HCPCS: Performed by: NURSE PRACTITIONER

## 2023-05-26 PROCEDURE — 3700000000 HC ANESTHESIA ATTENDED CARE: Performed by: ORTHOPAEDIC SURGERY

## 2023-05-26 PROCEDURE — 7100000001 HC PACU RECOVERY - ADDTL 15 MIN: Performed by: ORTHOPAEDIC SURGERY

## 2023-05-26 PROCEDURE — 7100000010 HC PHASE II RECOVERY - FIRST 15 MIN: Performed by: ORTHOPAEDIC SURGERY

## 2023-05-26 PROCEDURE — 2720000010 HC SURG SUPPLY STERILE: Performed by: ORTHOPAEDIC SURGERY

## 2023-05-26 PROCEDURE — 7100000011 HC PHASE II RECOVERY - ADDTL 15 MIN: Performed by: ORTHOPAEDIC SURGERY

## 2023-05-26 PROCEDURE — 6370000000 HC RX 637 (ALT 250 FOR IP): Performed by: ANESTHESIOLOGY

## 2023-05-26 PROCEDURE — 3700000001 HC ADD 15 MINUTES (ANESTHESIA): Performed by: ORTHOPAEDIC SURGERY

## 2023-05-26 PROCEDURE — 3600000003 HC SURGERY LEVEL 3 BASE: Performed by: ORTHOPAEDIC SURGERY

## 2023-05-26 PROCEDURE — 3600000013 HC SURGERY LEVEL 3 ADDTL 15MIN: Performed by: ORTHOPAEDIC SURGERY

## 2023-05-26 PROCEDURE — 29876 ARTHRS KNEE SURG SYNVCT MAJ: CPT | Performed by: ORTHOPAEDIC SURGERY

## 2023-05-26 PROCEDURE — 2709999900 HC NON-CHARGEABLE SUPPLY: Performed by: ORTHOPAEDIC SURGERY

## 2023-05-26 PROCEDURE — 2580000003 HC RX 258: Performed by: NURSE PRACTITIONER

## 2023-05-26 RX ORDER — LIDOCAINE HYDROCHLORIDE 20 MG/ML
INJECTION, SOLUTION INTRAVENOUS PRN
Status: DISCONTINUED | OUTPATIENT
Start: 2023-05-26 | End: 2023-05-26 | Stop reason: SDUPTHER

## 2023-05-26 RX ORDER — GLYCOPYRROLATE 0.2 MG/ML
INJECTION INTRAMUSCULAR; INTRAVENOUS PRN
Status: DISCONTINUED | OUTPATIENT
Start: 2023-05-26 | End: 2023-05-26 | Stop reason: SDUPTHER

## 2023-05-26 RX ORDER — FENTANYL CITRATE 0.05 MG/ML
50 INJECTION, SOLUTION INTRAMUSCULAR; INTRAVENOUS EVERY 5 MIN PRN
Status: DISCONTINUED | OUTPATIENT
Start: 2023-05-26 | End: 2023-05-26 | Stop reason: HOSPADM

## 2023-05-26 RX ORDER — PROPOFOL 10 MG/ML
INJECTION, EMULSION INTRAVENOUS PRN
Status: DISCONTINUED | OUTPATIENT
Start: 2023-05-26 | End: 2023-05-26 | Stop reason: SDUPTHER

## 2023-05-26 RX ORDER — ONDANSETRON 2 MG/ML
INJECTION INTRAMUSCULAR; INTRAVENOUS PRN
Status: DISCONTINUED | OUTPATIENT
Start: 2023-05-26 | End: 2023-05-26 | Stop reason: SDUPTHER

## 2023-05-26 RX ORDER — MAGNESIUM HYDROXIDE 1200 MG/15ML
LIQUID ORAL CONTINUOUS PRN
Status: COMPLETED | OUTPATIENT
Start: 2023-05-26 | End: 2023-05-26

## 2023-05-26 RX ORDER — ACETAMINOPHEN AND CODEINE PHOSPHATE 300; 30 MG/1; MG/1
1 TABLET ORAL EVERY 4 HOURS PRN
Status: DISCONTINUED | OUTPATIENT
Start: 2023-05-26 | End: 2023-05-26 | Stop reason: HOSPADM

## 2023-05-26 RX ORDER — FAMOTIDINE 20 MG/1
20 TABLET, FILM COATED ORAL ONCE
Status: DISCONTINUED | OUTPATIENT
Start: 2023-05-26 | End: 2023-05-26 | Stop reason: HOSPADM

## 2023-05-26 RX ORDER — MEPERIDINE HYDROCHLORIDE 25 MG/ML
12.5 INJECTION INTRAMUSCULAR; INTRAVENOUS; SUBCUTANEOUS EVERY 5 MIN PRN
Status: DISCONTINUED | OUTPATIENT
Start: 2023-05-26 | End: 2023-05-26 | Stop reason: HOSPADM

## 2023-05-26 RX ORDER — METOCLOPRAMIDE 5 MG/1
10 TABLET ORAL ONCE
Status: COMPLETED | OUTPATIENT
Start: 2023-05-26 | End: 2023-05-26

## 2023-05-26 RX ORDER — FENTANYL CITRATE 50 UG/ML
INJECTION, SOLUTION INTRAMUSCULAR; INTRAVENOUS PRN
Status: DISCONTINUED | OUTPATIENT
Start: 2023-05-26 | End: 2023-05-26 | Stop reason: SDUPTHER

## 2023-05-26 RX ORDER — ACETAMINOPHEN AND CODEINE PHOSPHATE 60; 300 MG/1; MG/1
1 TABLET ORAL EVERY 4 HOURS PRN
Qty: 28 TABLET | Refills: 0 | Status: SHIPPED | OUTPATIENT
Start: 2023-05-26 | End: 2023-06-02

## 2023-05-26 RX ORDER — DEXAMETHASONE SODIUM PHOSPHATE 10 MG/ML
INJECTION, SOLUTION INTRAMUSCULAR; INTRAVENOUS PRN
Status: DISCONTINUED | OUTPATIENT
Start: 2023-05-26 | End: 2023-05-26 | Stop reason: SDUPTHER

## 2023-05-26 RX ORDER — SODIUM CHLORIDE 0.9 % (FLUSH) 0.9 %
5-40 SYRINGE (ML) INJECTION EVERY 12 HOURS SCHEDULED
Status: DISCONTINUED | OUTPATIENT
Start: 2023-05-26 | End: 2023-05-26 | Stop reason: HOSPADM

## 2023-05-26 RX ORDER — SODIUM CHLORIDE 0.9 % (FLUSH) 0.9 %
5-40 SYRINGE (ML) INJECTION PRN
Status: DISCONTINUED | OUTPATIENT
Start: 2023-05-26 | End: 2023-05-26 | Stop reason: HOSPADM

## 2023-05-26 RX ORDER — MIDAZOLAM HYDROCHLORIDE 1 MG/ML
INJECTION INTRAMUSCULAR; INTRAVENOUS PRN
Status: DISCONTINUED | OUTPATIENT
Start: 2023-05-26 | End: 2023-05-26 | Stop reason: SDUPTHER

## 2023-05-26 RX ORDER — SODIUM CHLORIDE 9 MG/ML
INJECTION, SOLUTION INTRAVENOUS PRN
Status: DISCONTINUED | OUTPATIENT
Start: 2023-05-26 | End: 2023-05-26 | Stop reason: HOSPADM

## 2023-05-26 RX ORDER — DIPHENHYDRAMINE HYDROCHLORIDE 50 MG/ML
12.5 INJECTION INTRAMUSCULAR; INTRAVENOUS
Status: DISCONTINUED | OUTPATIENT
Start: 2023-05-26 | End: 2023-05-26 | Stop reason: HOSPADM

## 2023-05-26 RX ORDER — SODIUM CHLORIDE, SODIUM LACTATE, POTASSIUM CHLORIDE, CALCIUM CHLORIDE 600; 310; 30; 20 MG/100ML; MG/100ML; MG/100ML; MG/100ML
INJECTION, SOLUTION INTRAVENOUS CONTINUOUS
Status: DISCONTINUED | OUTPATIENT
Start: 2023-05-26 | End: 2023-05-26 | Stop reason: HOSPADM

## 2023-05-26 RX ORDER — NEOSTIGMINE METHYLSULFATE 1 MG/ML
INJECTION, SOLUTION INTRAVENOUS PRN
Status: DISCONTINUED | OUTPATIENT
Start: 2023-05-26 | End: 2023-05-26 | Stop reason: SDUPTHER

## 2023-05-26 RX ORDER — ROCURONIUM BROMIDE 10 MG/ML
INJECTION, SOLUTION INTRAVENOUS PRN
Status: DISCONTINUED | OUTPATIENT
Start: 2023-05-26 | End: 2023-05-26 | Stop reason: SDUPTHER

## 2023-05-26 RX ADMIN — PROPOFOL 50 MG: 10 INJECTION, EMULSION INTRAVENOUS at 11:15

## 2023-05-26 RX ADMIN — FENTANYL CITRATE 50 MCG: 50 INJECTION INTRAMUSCULAR; INTRAVENOUS at 11:38

## 2023-05-26 RX ADMIN — METOCLOPRAMIDE 10 MG: 5 TABLET ORAL at 09:19

## 2023-05-26 RX ADMIN — SODIUM CHLORIDE, POTASSIUM CHLORIDE, SODIUM LACTATE AND CALCIUM CHLORIDE: 600; 310; 30; 20 INJECTION, SOLUTION INTRAVENOUS at 12:12

## 2023-05-26 RX ADMIN — CEFAZOLIN 3000 MG: 2 INJECTION, POWDER, FOR SOLUTION INTRAMUSCULAR; INTRAVENOUS at 11:12

## 2023-05-26 RX ADMIN — FENTANYL CITRATE 100 MCG: 50 INJECTION INTRAMUSCULAR; INTRAVENOUS at 11:10

## 2023-05-26 RX ADMIN — MIDAZOLAM 2 MG: 1 INJECTION INTRAMUSCULAR; INTRAVENOUS at 11:10

## 2023-05-26 RX ADMIN — Medication 3 MG: at 12:04

## 2023-05-26 RX ADMIN — GLYCOPYRROLATE 0.6 MG: 0.2 INJECTION INTRAMUSCULAR; INTRAVENOUS at 12:04

## 2023-05-26 RX ADMIN — DEXAMETHASONE SODIUM PHOSPHATE 10 MG: 10 INJECTION, SOLUTION INTRAMUSCULAR; INTRAVENOUS at 11:17

## 2023-05-26 RX ADMIN — LIDOCAINE HYDROCHLORIDE 50 MG: 20 INJECTION, SOLUTION INTRAVENOUS at 11:36

## 2023-05-26 RX ADMIN — ROCURONIUM BROMIDE 40 MG: 10 INJECTION, SOLUTION INTRAVENOUS at 11:10

## 2023-05-26 RX ADMIN — FENTANYL CITRATE 50 MCG: 50 INJECTION INTRAMUSCULAR; INTRAVENOUS at 11:49

## 2023-05-26 RX ADMIN — PROPOFOL 50 MG: 10 INJECTION, EMULSION INTRAVENOUS at 11:36

## 2023-05-26 RX ADMIN — ACETAMINOPHEN AND CODEINE PHOSPHATE 1 TABLET: 300; 30 TABLET ORAL at 12:54

## 2023-05-26 RX ADMIN — ONDANSETRON 4 MG: 2 INJECTION INTRAMUSCULAR; INTRAVENOUS at 12:04

## 2023-05-26 RX ADMIN — PROPOFOL 200 MG: 10 INJECTION, EMULSION INTRAVENOUS at 11:10

## 2023-05-26 RX ADMIN — LIDOCAINE HYDROCHLORIDE 60 MG: 20 INJECTION, SOLUTION INTRAVENOUS at 11:10

## 2023-05-26 RX ADMIN — SODIUM CHLORIDE, POTASSIUM CHLORIDE, SODIUM LACTATE AND CALCIUM CHLORIDE: 600; 310; 30; 20 INJECTION, SOLUTION INTRAVENOUS at 09:32

## 2023-05-26 ASSESSMENT — PAIN SCALES - GENERAL
PAINLEVEL_OUTOF10: 6
PAINLEVEL_OUTOF10: 4

## 2023-05-26 ASSESSMENT — PAIN DESCRIPTION - LOCATION
LOCATION: KNEE
LOCATION: KNEE

## 2023-05-26 ASSESSMENT — PAIN DESCRIPTION - ORIENTATION
ORIENTATION: LEFT
ORIENTATION: LEFT

## 2023-05-26 ASSESSMENT — PAIN DESCRIPTION - PAIN TYPE: TYPE: SURGICAL PAIN

## 2023-05-26 NOTE — OP NOTE
Operative Note      Patient: Airam Chance  YOB: 1960  MRN: [id number]    Date of Procedure: 5/26/2023    Pre-Op Diagnosis Codes:     * Status post left partial knee replacement [Z96.652] synovitis    Post-Op Diagnosis: Same       Procedure(s):  LEFT KNEE ARTHROSCOPY LYSIS OF ADEHSIONS WITH SYNOVECTOMY-5/18/23    Surgeon(s): Kaleb Ortiz DO    Assistant:   Resident: Layla Hampton DO; Jesus Perla DO; Paula Geiger DO    Anesthesia: General    Estimated Blood Loss (mL): less than 780     Complications: None    Specimens:   * No specimens in log *    Implants:  * No implants in log *      Drains: * No LDAs found *    Findings: as above      Detailed Description of Procedure:   below      PREOPERATIVE DIAGNOSES: (1) Left knee Tricompartmental synovitis. POSTOPERATIVE DIAGNOSIS: Same as above  PROCEDURE: (1) Left knee arthroscopy with Tricompartmental synovectomy. ANESTHESIA: general  ESTIMATED BLOOD LOSS: Minimal.   COMPLICATIONS: None. OPERATIVE PROCEDURE: The patient was taken to the operative suite and was   given general anesthesia. The Left knee was identified with preoperative time-out. I applied a tourniquet to the  thigh, placed the  leg in a legholder, prepped and draped the leg in sterile fashion. I outlined an   incision along the anteromedial and anterolateral aspects of the knee. An incision was then made in the anterior medial and lateral aspects of the knee with an 11 blade. A blunt trocar was then inserted within the knee and I carried out a diagnostic arthroscopy. The patient had evidence of synovitis within the suprapatellar pouch, medial and lateral aspects of the knee. There was a large suprapatellar, medial and infrapatellar plica. I then established a medial working portal and carried out a tricompartmental   synovectomy removing angry synovium from the suprapatellar pouch, medial and   lateral compartments.   There was a large amount of scar tissue at the

## 2023-05-26 NOTE — ANESTHESIA POSTPROCEDURE EVALUATION
Department of Anesthesiology  Postprocedure Note    Patient: Alberto Herbert  MRN: [id number]  YOB: 1960  Date of evaluation: 5/26/2023      Procedure Summary     Date: 05/26/23 Room / Location: [address] [place] [address]    Anesthesia Start: 4273 Anesthesia Stop: 9688    Procedure: LEFT KNEE ARTHROSCOPY LYSIS OF ADEHSIONS WITH SYNOVECTOMY-5/18/23 (Left) Diagnosis:       Status post left partial knee replacement      (Status post left partial knee replacement [Z96.652])    Surgeons: Vilma Veronica DO Responsible Provider: Allyson Hallman MD    Anesthesia Type: General ASA Status: 3          Anesthesia Type: General    Surendra Phase I: Surendra Score: 10    Surendra Phase II:        Anesthesia Post Evaluation    Patient location during evaluation: PACU  Patient participation: complete - patient participated  Level of consciousness: awake and alert  Airway patency: patent  Nausea & Vomiting: no nausea and no vomiting  Complications: no  Cardiovascular status: hemodynamically stable  Respiratory status: room air and spontaneous ventilation  Hydration status: stable

## 2023-05-26 NOTE — H&P
negative,   PF grind test negative, Apprehension test negative, Patellar J sign  negative  L. Knee:  Lachman's negative, Anterior Drawer negative, Posterior Drawer negative  Gloria's negative, Thallasy  negative,   PF grind test negative, Apprehension test negative,  Patellar J sign  negative     Xrays:   S/p knee arthroplasty with no signs of loosening     MRI:    n/a  Radiographic findings reviewed with patient     Impression:       Encounter Diagnoses   Name Primary? S/P total knee arthroplasty, left Yes    Primary osteoarthritis of right knee              Plan:   Natural history and expected course discussed. Questions answered. Educational materials distributed. Rest, ice, compression, and elevation (RICE) therapy. Reduction in offending activity. I had a lengthy discussion with the patient regarding their diagnosis. I explained treatment options including surgical vs non surgical treatment. I reviewed in detail the risks and benefits and outlined the procedure in detail with expected outcomes and possible complications. I also discussed non surgical treatment such as injections (CSI and visco supplementation), physical therapy, topical creams and NSAID's. They have elected for conservative management at this time. And surgical management of the left knee. The risks and benefits of a knee arthroscopy were discussed with the patient. The risks are including but not limited to: infection, injuries to blood vessels and nerves, non relief of symptoms, arthrofibrosis of knee, need for further operative intervention, blood loss, PE/DVT, MI and death. The risks are understood by the patient and they wish to proceed with a Left knee arthroscopy mina of adhesion and synovectomy on 5/26/23.

## 2023-05-26 NOTE — DISCHARGE INSTRUCTIONS
you have questions about a medical condition or this instruction, always ask your healthcare professional. [place] [address] any warranty or liability for your use of this information.
No

## 2023-06-09 ENCOUNTER — OFFICE VISIT (OUTPATIENT)
Dept: ORTHOPEDIC SURGERY | Age: 63
End: 2023-06-09

## 2023-06-09 VITALS — WEIGHT: 276 LBS | TEMPERATURE: 98 F | BODY MASS INDEX: 44.36 KG/M2 | HEIGHT: 66 IN

## 2023-06-09 DIAGNOSIS — M65.9 SYNOVITIS OF LEFT KNEE: ICD-10-CM

## 2023-06-09 DIAGNOSIS — Z96.652 S/P TOTAL KNEE ARTHROPLASTY, LEFT: Primary | ICD-10-CM

## 2023-06-09 PROCEDURE — 99024 POSTOP FOLLOW-UP VISIT: CPT | Performed by: NURSE PRACTITIONER

## 2023-06-09 NOTE — PROGRESS NOTES
Subjective:        Mitch Patiño is here for follow-up after right knee arthroscopy YARI: . The patient is not having any pain. . She denies fever, wound drainage, increasing redness, pus, increasing pain, increasing swelling. Post op problems reported: none. She is ambulating normally. Objective:           General :    alert, appears stated age, and cooperative   Gait:  Normal.   Sutures:   Sutures in place and will be removed today. Incision:  healing well, no significant drainage, no dehiscence, no significant erythema   Tenderness:  none   Flexion ROM:  full range of motion   Extension ROM:  full range of motion   Effusion:  no   DVT Evaluation:  No evidence of DVT seen on physical exam.           Assessment:     Encounter Diagnoses   Name Primary? S/P total knee arthroplasty, left Yes    Synovitis of left knee          Plan:      Surgical pictures from the surgery were reveiwed with the patient  HEP  Sutures removed today. Follow up: 4 week.

## 2023-06-12 DIAGNOSIS — G47.09 OTHER INSOMNIA: ICD-10-CM

## 2023-06-12 DIAGNOSIS — E78.5 DYSLIPIDEMIA: ICD-10-CM

## 2023-06-12 DIAGNOSIS — E55.9 VITAMIN D DEFICIENCY: ICD-10-CM

## 2023-06-12 DIAGNOSIS — E03.9 HYPOTHYROIDISM, UNSPECIFIED TYPE: ICD-10-CM

## 2023-06-12 DIAGNOSIS — F33.42 RECURRENT MAJOR DEPRESSIVE DISORDER, IN FULL REMISSION (HCC): ICD-10-CM

## 2023-06-12 DIAGNOSIS — Z76.0 MEDICATION REFILL: ICD-10-CM

## 2023-06-12 DIAGNOSIS — E03.9 ACQUIRED HYPOTHYROIDISM: ICD-10-CM

## 2023-06-12 DIAGNOSIS — R73.03 PREDIABETES: ICD-10-CM

## 2023-06-12 LAB
ALBUMIN SERPL-MCNC: 4.1 G/DL (ref 3.5–5.2)
ALP SERPL-CCNC: 66 U/L (ref 35–104)
ALT SERPL-CCNC: 21 U/L (ref 0–32)
ANION GAP SERPL CALCULATED.3IONS-SCNC: 15 MMOL/L (ref 7–16)
AST SERPL-CCNC: 19 U/L (ref 0–31)
BASOPHILS # BLD: 0.1 E9/L (ref 0–0.2)
BASOPHILS NFR BLD: 1.3 % (ref 0–2)
BILIRUB SERPL-MCNC: 0.4 MG/DL (ref 0–1.2)
BUN SERPL-MCNC: 13 MG/DL (ref 6–23)
CALCIUM SERPL-MCNC: 9.1 MG/DL (ref 8.6–10.2)
CHLORIDE SERPL-SCNC: 104 MMOL/L (ref 98–107)
CHOLESTEROL, TOTAL: 197 MG/DL (ref 0–199)
CO2 SERPL-SCNC: 24 MMOL/L (ref 22–29)
CREAT SERPL-MCNC: 0.9 MG/DL (ref 0.5–1)
EOSINOPHIL # BLD: 0.17 E9/L (ref 0.05–0.5)
EOSINOPHIL NFR BLD: 2.1 % (ref 0–6)
ERYTHROCYTE [DISTWIDTH] IN BLOOD BY AUTOMATED COUNT: 13 FL (ref 11.5–15)
GLUCOSE SERPL-MCNC: 98 MG/DL (ref 74–99)
HBA1C MFR BLD: 6.1 % (ref 4–5.6)
HCT VFR BLD AUTO: 41 % (ref 34–48)
HDLC SERPL-MCNC: 47 MG/DL
HGB BLD-MCNC: 12.6 G/DL (ref 11.5–15.5)
IMM GRANULOCYTES # BLD: 0.05 E9/L
IMM GRANULOCYTES NFR BLD: 0.6 % (ref 0–5)
LDLC SERPL CALC-MCNC: 113 MG/DL (ref 0–99)
LYMPHOCYTES # BLD: 2.12 E9/L (ref 1.5–4)
LYMPHOCYTES NFR BLD: 26.8 % (ref 20–42)
MCH RBC QN AUTO: 30.9 PG (ref 26–35)
MCHC RBC AUTO-ENTMCNC: 30.7 % (ref 32–34.5)
MCV RBC AUTO: 100.5 FL (ref 80–99.9)
MONOCYTES # BLD: 0.6 E9/L (ref 0.1–0.95)
MONOCYTES NFR BLD: 7.6 % (ref 2–12)
NEUTROPHILS # BLD: 4.88 E9/L (ref 1.8–7.3)
NEUTS SEG NFR BLD: 61.6 % (ref 43–80)
PLATELET # BLD AUTO: 244 E9/L (ref 130–450)
PMV BLD AUTO: 11.1 FL (ref 7–12)
POTASSIUM SERPL-SCNC: 4.5 MMOL/L (ref 3.5–5)
PROT SERPL-MCNC: 6.8 G/DL (ref 6.4–8.3)
RBC # BLD AUTO: 4.08 E12/L (ref 3.5–5.5)
SODIUM SERPL-SCNC: 143 MMOL/L (ref 132–146)
T4 FREE SERPL-MCNC: 1.1 NG/DL (ref 0.93–1.7)
TRIGL SERPL-MCNC: 184 MG/DL (ref 0–149)
TSH SERPL-MCNC: 1.54 UIU/ML (ref 0.27–4.2)
VITAMIN D 25-HYDROXY: 37 NG/ML (ref 30–100)
VLDLC SERPL CALC-MCNC: 37 MG/DL
WBC # BLD: 7.9 E9/L (ref 4.5–11.5)

## 2023-06-15 PROBLEM — F32.A ANXIETY AND DEPRESSION: Chronic | Status: ACTIVE | Noted: 2017-05-18

## 2023-06-15 PROBLEM — F51.04 PSYCHOPHYSIOLOGICAL INSOMNIA: Status: ACTIVE | Noted: 2023-06-15

## 2023-06-15 PROBLEM — E55.9 VITAMIN D DEFICIENCY: Status: ACTIVE | Noted: 2023-06-15

## 2023-06-15 PROBLEM — F41.9 ANXIETY AND DEPRESSION: Status: ACTIVE | Noted: 2017-05-18

## 2023-06-15 PROBLEM — E03.9 ACQUIRED HYPOTHYROIDISM: Status: ACTIVE | Noted: 2023-06-15

## 2023-06-15 PROBLEM — F41.9 ANXIETY AND DEPRESSION: Chronic | Status: ACTIVE | Noted: 2017-05-18

## 2023-06-15 PROBLEM — E03.9 ACQUIRED HYPOTHYROIDISM: Chronic | Status: ACTIVE | Noted: 2023-06-15

## 2023-06-26 RX ORDER — DICLOFENAC SODIUM 75 MG/1
TABLET, DELAYED RELEASE ORAL
Qty: 60 TABLET | Refills: 3 | Status: SHIPPED | OUTPATIENT
Start: 2023-06-26

## 2023-06-27 ENCOUNTER — ANESTHESIA EVENT (OUTPATIENT)
Dept: OPERATING ROOM | Age: 63
End: 2023-06-27
Payer: COMMERCIAL

## 2023-06-28 ENCOUNTER — HOSPITAL ENCOUNTER (OUTPATIENT)
Age: 63
Setting detail: OUTPATIENT SURGERY
Discharge: HOME | End: 2023-06-28
Attending: ORTHOPAEDIC SURGERY | Admitting: ORTHOPAEDIC SURGERY
Payer: COMMERCIAL

## 2023-06-28 ENCOUNTER — ANESTHESIA (OUTPATIENT)
Dept: OPERATING ROOM | Age: 63
End: 2023-06-28
Payer: COMMERCIAL

## 2023-06-28 VITALS
DIASTOLIC BLOOD PRESSURE: 60 MMHG | RESPIRATION RATE: 18 BRPM | HEIGHT: 66 IN | OXYGEN SATURATION: 97 % | BODY MASS INDEX: 43.87 KG/M2 | TEMPERATURE: 96.9 F | HEART RATE: 59 BPM | WEIGHT: 273 LBS | SYSTOLIC BLOOD PRESSURE: 132 MMHG

## 2023-06-28 DIAGNOSIS — G89.18 POSTOPERATIVE PAIN: Primary | ICD-10-CM

## 2023-06-28 PROCEDURE — 2580000003 HC RX 258

## 2023-06-28 PROCEDURE — 3600000002 HC SURGERY LEVEL 2 BASE: Performed by: ORTHOPAEDIC SURGERY

## 2023-06-28 PROCEDURE — 7100000010 HC PHASE II RECOVERY - FIRST 15 MIN: Performed by: ORTHOPAEDIC SURGERY

## 2023-06-28 PROCEDURE — 2500000003 HC RX 250 WO HCPCS: Performed by: ORTHOPAEDIC SURGERY

## 2023-06-28 PROCEDURE — 2709999900 HC NON-CHARGEABLE SUPPLY: Performed by: ORTHOPAEDIC SURGERY

## 2023-06-28 PROCEDURE — 6360000002 HC RX W HCPCS

## 2023-06-28 PROCEDURE — 3700000000 HC ANESTHESIA ATTENDED CARE: Performed by: ORTHOPAEDIC SURGERY

## 2023-06-28 PROCEDURE — 3700000001 HC ADD 15 MINUTES (ANESTHESIA): Performed by: ORTHOPAEDIC SURGERY

## 2023-06-28 PROCEDURE — 3600000012 HC SURGERY LEVEL 2 ADDTL 15MIN: Performed by: ORTHOPAEDIC SURGERY

## 2023-06-28 PROCEDURE — 7100000011 HC PHASE II RECOVERY - ADDTL 15 MIN: Performed by: ORTHOPAEDIC SURGERY

## 2023-06-28 RX ORDER — TRAMADOL HYDROCHLORIDE 50 MG/1
50 TABLET ORAL PRN
Status: CANCELLED | OUTPATIENT
Start: 2023-06-28 | End: 2023-06-28

## 2023-06-28 RX ORDER — SODIUM CHLORIDE 9 MG/ML
INJECTION, SOLUTION INTRAVENOUS PRN
Status: CANCELLED | OUTPATIENT
Start: 2023-06-28

## 2023-06-28 RX ORDER — PROPOFOL 10 MG/ML
INJECTION, EMULSION INTRAVENOUS CONTINUOUS PRN
Status: DISCONTINUED | OUTPATIENT
Start: 2023-06-28 | End: 2023-06-28 | Stop reason: SDUPTHER

## 2023-06-28 RX ORDER — SODIUM CHLORIDE 0.9 % (FLUSH) 0.9 %
5-40 SYRINGE (ML) INJECTION PRN
Status: CANCELLED | OUTPATIENT
Start: 2023-06-28

## 2023-06-28 RX ORDER — ACETAMINOPHEN 650 MG
TABLET, EXTENDED RELEASE ORAL PRN
Status: DISCONTINUED | OUTPATIENT
Start: 2023-06-28 | End: 2023-06-28 | Stop reason: ALTCHOICE

## 2023-06-28 RX ORDER — SODIUM CHLORIDE 9 MG/ML
INJECTION, SOLUTION INTRAVENOUS CONTINUOUS PRN
Status: DISCONTINUED | OUTPATIENT
Start: 2023-06-28 | End: 2023-06-28 | Stop reason: SDUPTHER

## 2023-06-28 RX ORDER — SODIUM CHLORIDE 0.9 % (FLUSH) 0.9 %
5-40 SYRINGE (ML) INJECTION EVERY 12 HOURS SCHEDULED
Status: CANCELLED | OUTPATIENT
Start: 2023-06-28

## 2023-06-28 RX ORDER — TRAMADOL HYDROCHLORIDE 50 MG/1
100 TABLET ORAL PRN
Status: CANCELLED | OUTPATIENT
Start: 2023-06-28 | End: 2023-06-28

## 2023-06-28 RX ADMIN — SODIUM CHLORIDE: 9 INJECTION, SOLUTION INTRAVENOUS at 12:42

## 2023-06-28 RX ADMIN — PROPOFOL 125 MCG/KG/MIN: 10 INJECTION, EMULSION INTRAVENOUS at 12:49

## 2023-06-28 ASSESSMENT — PAIN - FUNCTIONAL ASSESSMENT: PAIN_FUNCTIONAL_ASSESSMENT: NONE - DENIES PAIN

## 2023-07-27 RX ORDER — DICLOFENAC SODIUM 75 MG/1
75 TABLET, DELAYED RELEASE ORAL 2 TIMES DAILY
Qty: 60 TABLET | Refills: 3 | Status: SHIPPED | OUTPATIENT
Start: 2023-07-27

## 2023-08-26 ENCOUNTER — HOSPITAL ENCOUNTER (OUTPATIENT)
Dept: MRI IMAGING | Age: 63
End: 2023-08-26
Payer: COMMERCIAL

## 2023-08-26 DIAGNOSIS — M11.20 CHONDROCALCINOSIS: ICD-10-CM

## 2023-08-26 DIAGNOSIS — M19.032 LOCALIZED PRIMARY OSTEOARTHRITIS OF WRIST, LEFT: ICD-10-CM

## 2023-08-26 DIAGNOSIS — G56.02 CARPAL TUNNEL SYNDROME ON LEFT: ICD-10-CM

## 2023-08-26 DIAGNOSIS — S63.92XA SPRAIN OF UNSPECIFIED PART OF LEFT WRIST AND HAND, INITIAL ENCOUNTER: ICD-10-CM

## 2023-08-26 DIAGNOSIS — M25.532 PAIN IN LEFT WRIST: ICD-10-CM

## 2023-08-26 PROCEDURE — 73221 MRI JOINT UPR EXTREM W/O DYE: CPT

## 2023-10-19 ENCOUNTER — OFFICE VISIT (OUTPATIENT)
Dept: ORTHOPEDIC SURGERY | Age: 63
End: 2023-10-19

## 2023-10-19 VITALS — WEIGHT: 279 LBS | HEIGHT: 66 IN | BODY MASS INDEX: 44.84 KG/M2 | TEMPERATURE: 98 F

## 2023-10-19 DIAGNOSIS — M17.11 PRIMARY OSTEOARTHRITIS OF RIGHT KNEE: Primary | ICD-10-CM

## 2023-10-19 RX ORDER — TRIAMCINOLONE ACETONIDE 40 MG/ML
40 INJECTION, SUSPENSION INTRA-ARTICULAR; INTRAMUSCULAR ONCE
Status: COMPLETED | OUTPATIENT
Start: 2023-10-19 | End: 2023-10-19

## 2023-10-19 RX ADMIN — TRIAMCINOLONE ACETONIDE 40 MG: 40 INJECTION, SUSPENSION INTRA-ARTICULAR; INTRAMUSCULAR at 09:50

## 2023-10-19 NOTE — PROGRESS NOTES
Chief Complaint   Patient presents with    Knee Pain     Right knee pain follow up. Knee now painful again since last visit. Started to become painful about two weeks ago. No new injuries to knee. Phylicia Rodriguez returns today for follow-up of her right knee pain. She had zilretta 3 months ago with good relief .  However today she does not have approval.     Past Medical History:   Diagnosis Date    Alopecia areata     Anxiety     Carpal tunnel syndrome     Depression     GERD (gastroesophageal reflux disease)     Hyperlipidemia     Obesity, Class III, BMI 40-49.9 (morbid obesity) (HCC)     Osteoarthritis     PONV (postoperative nausea and vomiting)     PVC (premature ventricular contraction)     controlled w Atenolol    Sleep apnea     CPAP    Thyroid disease      Past Surgical History:   Procedure Laterality Date    BREAST SURGERY Left     cyst benign    CARPAL TUNNEL RELEASE Right 05/15/2023    RIGHT CARPAL TUNNEL RELEASE performed by Nia Jimenez MD at [facility] Left 6/28/2023    LEFT CARPAL TUNNEL RELEASE performed by Nia Jimenez MD at [address]      x2    CHOLECYSTECTOMY      COLONOSCOPY  12/16/2014    bx done Dr Augustina Rojas  12/28/2011         KNEE ARTHROSCOPY Left     KNEE ARTHROSCOPY Left 5/26/2023    LEFT KNEE ARTHROSCOPY LYSIS OF ADEHSIONS WITH SYNOVECTOMY-5/18/23 performed by Carley Ghosh DO at [address] Bilateral 03/05/2014    lumbar facet #1    NERVE BLOCK  03/12/2014    paravertebral facet bilateral lumbar #2    NERVE BLOCK N/A 04/09/2014    cerv #1    NERVE BLOCK N/A 04/16/2014    cervical epidural  #2    NERVE BLOCK N/A 04/23/2014    cerv #3    NERVE BLOCK Bilateral 12/21/2020    intra-articular facet joint injection     OTHER SURGICAL HISTORY  07/16/2014    Radiofrequency right lumbar L3-4, L4-5, L5-S1    OTHER SURGICAL HISTORY Left 08/18/2014    lumbar radiofrequency    PAIN MANAGEMENT

## 2024-01-08 ENCOUNTER — OFFICE VISIT (OUTPATIENT)
Dept: ORTHOPEDIC SURGERY | Age: 64
End: 2024-01-08
Payer: COMMERCIAL

## 2024-01-08 VITALS — BODY MASS INDEX: 43.79 KG/M2 | WEIGHT: 279 LBS | TEMPERATURE: 98 F | HEIGHT: 67 IN

## 2024-01-08 DIAGNOSIS — M17.11 PRIMARY OSTEOARTHRITIS OF RIGHT KNEE: Primary | ICD-10-CM

## 2024-01-08 PROCEDURE — 99214 OFFICE O/P EST MOD 30 MIN: CPT | Performed by: ORTHOPAEDIC SURGERY

## 2024-01-08 RX ORDER — TRAMADOL HYDROCHLORIDE 50 MG/1
50 TABLET ORAL EVERY 6 HOURS PRN
Qty: 28 TABLET | Refills: 0 | Status: SHIPPED | OUTPATIENT
Start: 2024-01-08 | End: 2024-01-15

## 2024-01-08 NOTE — PROGRESS NOTES
Chief Complaint   Patient presents with    Knee Pain     Right knee pain. Wants to discuss surgery        Diane Espinoza returns today for follow-up of her right knee pain. Her pain is worse than when I saw her last. Previous treatment measures were not successful.    Past Medical History:   Diagnosis Date    Alopecia areata     Anxiety     Carpal tunnel syndrome     Depression     GERD (gastroesophageal reflux disease)     Hyperlipidemia     Obesity, Class III, BMI 40-49.9 (morbid obesity) (HCC)     Osteoarthritis     PONV (postoperative nausea and vomiting)     PVC (premature ventricular contraction)     controlled w Atenolol    Sleep apnea     CPAP    Thyroid disease      Past Surgical History:   Procedure Laterality Date    BREAST SURGERY Left     cyst benign    CARPAL TUNNEL RELEASE Right 05/15/2023    RIGHT CARPAL TUNNEL RELEASE performed by Fina Morgan MD at [facility] OR    CARPAL TUNNEL RELEASE Left 2023    LEFT CARPAL TUNNEL RELEASE performed by Fina Morgan MD at [facility] OR     SECTION      x2    CHOLECYSTECTOMY      COLONOSCOPY  2014    bx done Dr Isaac    ECHO COMPL W DOP COLOR FLOW  2011         KNEE ARTHROSCOPY Left     KNEE ARTHROSCOPY Left 2023    LEFT KNEE ARTHROSCOPY LYSIS OF ADEHSIONS WITH SYNOVECTOMY-23 performed by Geoff Oliveira DO at [facility] OR    NERVE BLOCK Bilateral 2014    lumbar facet #1    NERVE BLOCK  2014    paravertebral facet bilateral lumbar #2    NERVE BLOCK N/A 2014    cerv #1    NERVE BLOCK N/A 2014    cervical epidural  #2    NERVE BLOCK N/A 2014    cerv #3    NERVE BLOCK Bilateral 2020    intra-articular facet joint injection     OTHER SURGICAL HISTORY  2014    Radiofrequency right lumbar L3-4, L4-5, L5-S1    OTHER SURGICAL HISTORY Left 2014    lumbar radiofrequency    PAIN MANAGEMENT PROCEDURE Bilateral 2020    BILATERAL INTRA-ARTICULAR FACET JOINT INJECTION WITH

## 2024-01-25 ENCOUNTER — PREP FOR PROCEDURE (OUTPATIENT)
Dept: ORTHOPEDIC SURGERY | Age: 64
End: 2024-01-25

## 2024-01-25 ENCOUNTER — TELEPHONE (OUTPATIENT)
Dept: ORTHOPEDIC SURGERY | Age: 64
End: 2024-01-25

## 2024-01-25 PROBLEM — M17.11 RIGHT KNEE DJD: Status: ACTIVE | Noted: 2024-01-25

## 2024-01-25 NOTE — TELEPHONE ENCOUNTER
Prior Authorization Form:      DEMOGRAPHICS:                     Patient Name:  Cheng Espinoza  Patient :  1960            Insurance:  Payor: / No coverage found.  Insurance ID Number:    Payer/Plan Subscr  Sex Relation Sub. Ins. ID Effective Group Num   1. [employer]* CHENG ESPINOZA 1960 Female Self [id number]                                   [address] [address]         DIAGNOSIS & PROCEDURE:                       Procedure/Operation: RIGHT KNEE TOTAL KNEE ARTHROPLASTY           CPT Code: 81111    Diagnosis:  RIGHT KNEE DJD    ICD10 Code: M17.11    Location:  [facility]    Surgeon:  CHRISTEL ALLISON    SCHEDULING INFORMATION:                          Date: 24    Time: TO FOLLOW              Anesthesia:  General                                                       Status:  Outpatient        Special Comments:  JUSTO       Electronically signed by Augustina Reis ATC on 2024 at 2:12 PM

## 2024-01-26 RX ORDER — SODIUM CHLORIDE, SODIUM LACTATE, POTASSIUM CHLORIDE, CALCIUM CHLORIDE 600; 310; 30; 20 MG/100ML; MG/100ML; MG/100ML; MG/100ML
INJECTION, SOLUTION INTRAVENOUS CONTINUOUS
OUTPATIENT
Start: 2024-02-07

## 2024-01-29 ENCOUNTER — HOSPITAL ENCOUNTER (OUTPATIENT)
Dept: GENERAL RADIOLOGY | Age: 64
Discharge: HOME | End: 2024-01-31
Payer: COMMERCIAL

## 2024-01-29 ENCOUNTER — HOSPITAL ENCOUNTER (OUTPATIENT)
Dept: PREADMISSION TESTING | Age: 64
Discharge: HOME | End: 2024-01-29
Payer: COMMERCIAL

## 2024-01-29 ENCOUNTER — ANESTHESIA EVENT (OUTPATIENT)
Dept: OPERATING ROOM | Age: 64
End: 2024-01-29
Payer: COMMERCIAL

## 2024-01-29 VITALS
WEIGHT: 277 LBS | SYSTOLIC BLOOD PRESSURE: 124 MMHG | RESPIRATION RATE: 16 BRPM | BODY MASS INDEX: 43.47 KG/M2 | TEMPERATURE: 97.9 F | HEART RATE: 63 BPM | DIASTOLIC BLOOD PRESSURE: 63 MMHG | OXYGEN SATURATION: 97 % | HEIGHT: 67 IN

## 2024-01-29 DIAGNOSIS — Z01.818 PRE-OP EXAM: ICD-10-CM

## 2024-01-29 LAB
ALBUMIN SERPL-MCNC: 4.5 G/DL (ref 3.5–5.2)
ALP SERPL-CCNC: 83 U/L (ref 35–104)
ALT SERPL-CCNC: 16 U/L (ref 0–32)
ANION GAP SERPL CALCULATED.3IONS-SCNC: 9 MMOL/L (ref 7–16)
AST SERPL-CCNC: 15 U/L (ref 0–31)
BASOPHILS # BLD: 0.06 K/UL (ref 0–0.2)
BASOPHILS NFR BLD: 1 % (ref 0–2)
BILIRUB SERPL-MCNC: 0.2 MG/DL (ref 0–1.2)
BILIRUB UR QL STRIP: NEGATIVE
BUN SERPL-MCNC: 12 MG/DL (ref 6–23)
CALCIUM SERPL-MCNC: 9.2 MG/DL (ref 8.6–10.2)
CHLORIDE SERPL-SCNC: 105 MMOL/L (ref 98–107)
CLARITY UR: CLEAR
CO2 SERPL-SCNC: 29 MMOL/L (ref 22–29)
COLOR UR: ABNORMAL
COMMENT: ABNORMAL
CREAT SERPL-MCNC: 0.9 MG/DL (ref 0.5–1)
EOSINOPHIL # BLD: 0.13 K/UL (ref 0.05–0.5)
EOSINOPHILS RELATIVE PERCENT: 1 % (ref 0–6)
ERYTHROCYTE [DISTWIDTH] IN BLOOD BY AUTOMATED COUNT: 12.7 % (ref 11.5–15)
GFR SERPL CREATININE-BSD FRML MDRD: >60 ML/MIN/1.73M2
GLUCOSE SERPL-MCNC: 96 MG/DL (ref 74–99)
GLUCOSE UR STRIP-MCNC: NEGATIVE MG/DL
HBA1C MFR BLD: 5.7 % (ref 4–5.6)
HCT VFR BLD AUTO: 41.2 % (ref 34–48)
HGB BLD-MCNC: 13.3 G/DL (ref 11.5–15.5)
HGB UR QL STRIP.AUTO: NEGATIVE
IMM GRANULOCYTES # BLD AUTO: 0.03 K/UL (ref 0–0.58)
IMM GRANULOCYTES NFR BLD: 0 % (ref 0–5)
INR PPP: 1
KETONES UR STRIP-MCNC: NEGATIVE MG/DL
LEUKOCYTE ESTERASE UR QL STRIP: NEGATIVE
LYMPHOCYTES NFR BLD: 1.85 K/UL (ref 1.5–4)
LYMPHOCYTES RELATIVE PERCENT: 20 % (ref 20–42)
MCH RBC QN AUTO: 30.9 PG (ref 26–35)
MCHC RBC AUTO-ENTMCNC: 32.3 G/DL (ref 32–34.5)
MCV RBC AUTO: 95.8 FL (ref 80–99.9)
MONOCYTES NFR BLD: 0.6 K/UL (ref 0.1–0.95)
MONOCYTES NFR BLD: 7 % (ref 2–12)
NEUTROPHILS NFR BLD: 71 % (ref 43–80)
NEUTS SEG NFR BLD: 6.49 K/UL (ref 1.8–7.3)
NITRITE UR QL STRIP: NEGATIVE
PARTIAL THROMBOPLASTIN TIME: 34.4 SEC (ref 24.5–35.1)
PH UR STRIP: 7 [PH] (ref 5–9)
PLATELET # BLD AUTO: 261 K/UL (ref 130–450)
PMV BLD AUTO: 11.9 FL (ref 7–12)
POTASSIUM SERPL-SCNC: 3.9 MMOL/L (ref 3.5–5)
PREALB SERPL-MCNC: 27 MG/DL (ref 20–40)
PROT SERPL-MCNC: 7.2 G/DL (ref 6.4–8.3)
PROT UR STRIP-MCNC: NEGATIVE MG/DL
PROTHROMBIN TIME: 11.4 SEC (ref 9.3–12.4)
RBC # BLD AUTO: 4.3 M/UL (ref 3.5–5.5)
SODIUM SERPL-SCNC: 143 MMOL/L (ref 132–146)
SP GR UR STRIP: 1.01 (ref 1–1.03)
UROBILINOGEN UR STRIP-ACNC: 0.2 EU/DL (ref 0–1)
WBC OTHER # BLD: 9.2 K/UL (ref 4.5–11.5)

## 2024-01-29 PROCEDURE — 85610 PROTHROMBIN TIME: CPT

## 2024-01-29 PROCEDURE — 87077 CULTURE AEROBIC IDENTIFY: CPT

## 2024-01-29 PROCEDURE — 85730 THROMBOPLASTIN TIME PARTIAL: CPT

## 2024-01-29 PROCEDURE — 87086 URINE CULTURE/COLONY COUNT: CPT

## 2024-01-29 PROCEDURE — 84134 ASSAY OF PREALBUMIN: CPT

## 2024-01-29 PROCEDURE — 85025 COMPLETE CBC W/AUTO DIFF WBC: CPT

## 2024-01-29 PROCEDURE — 71046 X-RAY EXAM CHEST 2 VIEWS: CPT

## 2024-01-29 PROCEDURE — 83036 HEMOGLOBIN GLYCOSYLATED A1C: CPT

## 2024-01-29 PROCEDURE — 81003 URINALYSIS AUTO W/O SCOPE: CPT

## 2024-01-29 PROCEDURE — 80053 COMPREHEN METABOLIC PANEL: CPT

## 2024-01-29 PROCEDURE — 87081 CULTURE SCREEN ONLY: CPT

## 2024-01-29 RX ORDER — FENTANYL CITRATE 50 UG/ML
100 INJECTION, SOLUTION INTRAMUSCULAR; INTRAVENOUS ONCE
OUTPATIENT
Start: 2024-02-07

## 2024-01-29 RX ORDER — ROPIVACAINE HYDROCHLORIDE 5 MG/ML
30 INJECTION, SOLUTION EPIDURAL; INFILTRATION; PERINEURAL ONCE
OUTPATIENT
Start: 2024-02-07

## 2024-01-29 RX ORDER — MIDAZOLAM HYDROCHLORIDE 1 MG/ML
2 INJECTION INTRAMUSCULAR; INTRAVENOUS ONCE
OUTPATIENT
Start: 2024-02-07

## 2024-01-29 ASSESSMENT — PAIN DESCRIPTION - DESCRIPTORS: DESCRIPTORS: ACHING;SHARP

## 2024-01-29 ASSESSMENT — PAIN SCALES - GENERAL: PAINLEVEL_OUTOF10: 6

## 2024-01-29 ASSESSMENT — PAIN DESCRIPTION - ORIENTATION: ORIENTATION: RIGHT

## 2024-01-29 ASSESSMENT — PAIN DESCRIPTION - LOCATION: LOCATION: KNEE

## 2024-01-29 NOTE — PROGRESS NOTES
Patient attended preoperative Total Joint Camp on 1/29/2024.  Patient is scheduled to have an elective knee replacement.  Patient was educated regarding Disease Process, Medications, Smoking Cessation, Oxygenation, Incentive Spirometry and Deep Breath and Cough, signs and symptoms of postoperative joint infection that include: Fever, Chills, Pain Control, Drainage and Redness, post-op follow up with orthopaedic surgeon, dressing removal, staple removal, ambulatory devices which include a wheeled walker and cane, bed mobility, correct anatomical alignment, active range of motion, proper transferring technique, incision care, infection prevention measures, non-pharmacologic comfort measures, notification of inadequate pain control measures, pain scale for assessing level of pain, pharmacologic pain management, relaxation techniques.     
your fingers or toes.    DO NOT wear any jewelry or piercings on day of surgery.  All body piercing jewelry must be removed.    Shower the night before surgery with _x__Antibacterial soap /CHG WIPES___x_____    TOTAL JOINT REPLACEMENT/HYSTERECTOMY PATIENTS ONLY---Remember to bring Blood Bank bracelet to the hospital on the day of surgery.    If you have a Living Will and Durable Power of  for Healthcare, please bring in a copy.    If appropriate bring crutches, inspirex, WALKER, CANE etc...    Notify your Surgeon if you develop any illness between now and surgery time, cough, cold, fever, sore throat, nausea, vomiting, etc.  Please notify your surgeon if you experience dizziness, shortness of breath or blurred vision between now & the time of your surgery.    If you have ___dentures, they will be removed before going to the OR; we will provide you a container. If you wear ___contact lenses or _x__glasses, they will be removed; please bring a case for them.    To provide excellent care visitors will be limited to 2 in the room at any given time.    Please bring picture ID and insurance card.                                                                                         During flu season no children under the age of 14 are permitted in the hospital for the safety of all patients.     Other please come in through main entrance and go to information desk on right side     Any implantable device requiring remote therapy, Please bring remote day of surgery and bring your implant card with you!                      Please call AMBULATORY CARE if you have any further questions.   Pre Admit Testing           [phone]     [facility] [phone]

## 2024-01-29 NOTE — ANESTHESIA PRE PROCEDURE
times daily as needed) 60 tablet 0    Cholecalciferol (VITAMIN D-3) 5000 UNITS TABS Take by mouth daily       No current facility-administered medications for this encounter.       Allergies:    Allergies   Allergen Reactions    Mobic [Meloxicam] Itching     Burning/Itching of there scalp, palms, back and feet.     Morphine Sulfate Itching    Percocet [Oxycodone-Acetaminophen] Itching    Vicodin [Hydrocodone-Acetaminophen] Itching       Problem List:    Patient Active Problem List   Diagnosis Code    Mechanical low back pain M54.59    DDD (degenerative disc disease), lumbar M51.36    Facet syndrome, lumbar M47.816    Sacroiliitis, right M46.1    Protruded lumbar disc M51.26    degenerative Osteoarthritis of cervical spine M47.812    Cervical facet syndrome M47.812    Cervical radiculopathy M54.12    Protruded cervical disc M50.20    Shoulder impingement M25.819    Anxiety and depression F41.9, F32.A    GERD (gastroesophageal reflux disease) K21.9    Sleep apnea G47.30    PVC (premature ventricular contraction) I49.3    Obesity, Class III, BMI 40-49.9 (morbid obesity) ([facility]) E66.01    Dyslipidemia E78.5    Left Achilles tendinitis M76.62    Primary osteoarthritis of one knee, left M17.12    S/P knee replacement Z96.659    Status post total left knee replacement Z96.652    Primary osteoarthritis of left knee M17.12    S/P total knee arthroplasty, left Z96.652    Lumbar radiculitis M54.16    Recurrent major depressive disorder, in full remission ([facility]) F33.42    Postoperative pain G89.18    Synovitis of left knee M65.9    Acquired hypothyroidism E03.9    Vitamin D deficiency E55.9    Psychophysiological insomnia F51.04    Right knee DJD M17.11       Past Medical History:        Diagnosis Date    Alopecia areata     Anxiety     Arthritis     Carpal tunnel syndrome     Depression     GERD (gastroesophageal reflux disease)     Hyperlipidemia     Obesity, Class III, BMI 40-49.9 (morbid obesity) ([facility])     Osteoarthritis

## 2024-01-31 LAB
MICROORGANISM SPEC CULT: ABNORMAL
MICROORGANISM SPEC CULT: NORMAL
SPECIMEN DESCRIPTION: ABNORMAL
SPECIMEN DESCRIPTION: NORMAL

## 2024-02-06 NOTE — CARE COORDINATION
02/06/24 1210   Social/Functional History   Lives With Spouse   Type of Home House   Home Layout One level   Home Access Stairs to enter with rails   Entrance Stairs - Number of Steps 3 steps to enter- 1 rail   Bathroom Shower/Tub Walk-in shower   Bathroom Toilet Handicap height   Bathroom Equipment Built-in shower seat;Grab bars in shower   Home Equipment Walker, rolling   Receives Help From Family   Condition of Participation: Discharge Planning   The Plan for Transition of Care is related to the following treatment goals: HHC   The Patient and/or Patient Representative was provided with a Choice of Provider? Patient   The Patient and/Or Patient Representative agree with the Discharge Plan? Yes   Freedom of Choice list was provided with basic dialogue that supports the patient's individualized plan of care/goals, treatment preferences, and shares the quality data associated with the providers?  No  (pt prefers [facility], has [facility] insurance and are the in network provider and had agency in the past)     2/6/2024: SS Note/Discharge planning:  Met with pt in PAT, pt having surgery for a elective total knee arthroplasty on 2/7, reviewed rehab needs, pt plans to return home day of surgery from [facility], pt's  will help her and transport her home, pt had [facility] in the past after her other knee surgery and prefers their agency again,  referral made to [facility] liaison who will follow post-op for home PT orders, pt has dme needed, Nursing informed.Electronically signed by BRENT Nunez on 2/6/2024 at 12:08 PM

## 2024-02-07 ENCOUNTER — APPOINTMENT (OUTPATIENT)
Dept: GENERAL RADIOLOGY | Age: 64
End: 2024-02-07
Attending: ORTHOPAEDIC SURGERY
Payer: COMMERCIAL

## 2024-02-07 ENCOUNTER — ANESTHESIA (OUTPATIENT)
Dept: OPERATING ROOM | Age: 64
End: 2024-02-07
Payer: COMMERCIAL

## 2024-02-07 ENCOUNTER — HOSPITAL ENCOUNTER (OUTPATIENT)
Age: 64
Setting detail: OUTPATIENT SURGERY
Discharge: HOME | End: 2024-02-07
Attending: ORTHOPAEDIC SURGERY | Admitting: ORTHOPAEDIC SURGERY
Payer: COMMERCIAL

## 2024-02-07 VITALS
RESPIRATION RATE: 16 BRPM | DIASTOLIC BLOOD PRESSURE: 60 MMHG | OXYGEN SATURATION: 92 % | SYSTOLIC BLOOD PRESSURE: 122 MMHG | HEART RATE: 67 BPM | TEMPERATURE: 97.2 F

## 2024-02-07 DIAGNOSIS — Z96.651 S/P TOTAL KNEE ARTHROPLASTY, RIGHT: ICD-10-CM

## 2024-02-07 DIAGNOSIS — Z01.818 PRE-OP EXAM: Primary | ICD-10-CM

## 2024-02-07 DIAGNOSIS — M17.11 RIGHT KNEE DJD: ICD-10-CM

## 2024-02-07 PROCEDURE — 7100000000 HC PACU RECOVERY - FIRST 15 MIN: Performed by: ORTHOPAEDIC SURGERY

## 2024-02-07 PROCEDURE — 7100000011 HC PHASE II RECOVERY - ADDTL 15 MIN: Performed by: ORTHOPAEDIC SURGERY

## 2024-02-07 PROCEDURE — 6360000002 HC RX W HCPCS: Performed by: ORTHOPAEDIC SURGERY

## 2024-02-07 PROCEDURE — 97161 PT EVAL LOW COMPLEX 20 MIN: CPT | Performed by: PHYSICAL THERAPIST

## 2024-02-07 PROCEDURE — 6360000002 HC RX W HCPCS: Performed by: ANESTHESIOLOGY

## 2024-02-07 PROCEDURE — 6360000002 HC RX W HCPCS

## 2024-02-07 PROCEDURE — 3600000005 HC SURGERY LEVEL 5 BASE: Performed by: ORTHOPAEDIC SURGERY

## 2024-02-07 PROCEDURE — 2580000003 HC RX 258: Performed by: ORTHOPAEDIC SURGERY

## 2024-02-07 PROCEDURE — 2500000003 HC RX 250 WO HCPCS: Performed by: ORTHOPAEDIC SURGERY

## 2024-02-07 PROCEDURE — 2580000003 HC RX 258

## 2024-02-07 PROCEDURE — C1713 ANCHOR/SCREW BN/BN,TIS/BN: HCPCS | Performed by: ORTHOPAEDIC SURGERY

## 2024-02-07 PROCEDURE — 6370000000 HC RX 637 (ALT 250 FOR IP): Performed by: ORTHOPAEDIC SURGERY

## 2024-02-07 PROCEDURE — 3700000000 HC ANESTHESIA ATTENDED CARE: Performed by: ORTHOPAEDIC SURGERY

## 2024-02-07 PROCEDURE — 7100000010 HC PHASE II RECOVERY - FIRST 15 MIN: Performed by: ORTHOPAEDIC SURGERY

## 2024-02-07 PROCEDURE — 27447 TOTAL KNEE ARTHROPLASTY: CPT | Performed by: ORTHOPAEDIC SURGERY

## 2024-02-07 PROCEDURE — 7100000001 HC PACU RECOVERY - ADDTL 15 MIN: Performed by: ORTHOPAEDIC SURGERY

## 2024-02-07 PROCEDURE — 2580000003 HC RX 258: Performed by: ANESTHESIOLOGY

## 2024-02-07 PROCEDURE — 97110 THERAPEUTIC EXERCISES: CPT | Performed by: PHYSICAL THERAPIST

## 2024-02-07 PROCEDURE — C1776 JOINT DEVICE (IMPLANTABLE): HCPCS | Performed by: ORTHOPAEDIC SURGERY

## 2024-02-07 PROCEDURE — 3600000015 HC SURGERY LEVEL 5 ADDTL 15MIN: Performed by: ORTHOPAEDIC SURGERY

## 2024-02-07 PROCEDURE — 73560 X-RAY EXAM OF KNEE 1 OR 2: CPT

## 2024-02-07 PROCEDURE — 3700000001 HC ADD 15 MINUTES (ANESTHESIA): Performed by: ORTHOPAEDIC SURGERY

## 2024-02-07 PROCEDURE — 6370000000 HC RX 637 (ALT 250 FOR IP): Performed by: ANESTHESIOLOGY

## 2024-02-07 PROCEDURE — 64447 NJX AA&/STRD FEMORAL NRV IMG: CPT | Performed by: ANESTHESIOLOGY

## 2024-02-07 PROCEDURE — 2709999900 HC NON-CHARGEABLE SUPPLY: Performed by: ORTHOPAEDIC SURGERY

## 2024-02-07 PROCEDURE — 97116 GAIT TRAINING THERAPY: CPT | Performed by: PHYSICAL THERAPIST

## 2024-02-07 DEVICE — KNEE K1 TOT HEMI STD CEM IMPL CAPPED K1 SN: Type: IMPLANTABLE DEVICE | Status: FUNCTIONAL

## 2024-02-07 DEVICE — LEGION POSTERIOR STABILIZED                                    OXINIUM FEMORAL SIZE 5 RIGHT
Type: IMPLANTABLE DEVICE | Site: KNEE | Status: FUNCTIONAL
Brand: LEGION

## 2024-02-07 DEVICE — FULL DOSE BONE CEMENT, 10 PACK CATALOG NUMBER IS 6191-1-010
Type: IMPLANTABLE DEVICE | Status: FUNCTIONAL
Brand: SIMPLEX

## 2024-02-07 DEVICE — GENESIS II NON-POROUS TIBIAL                                    BASEPLATE SIZE 5 RIGHT
Type: IMPLANTABLE DEVICE | Site: KNEE | Status: FUNCTIONAL
Brand: GENESIS II

## 2024-02-07 DEVICE — GEN II 7.5MM RESUR PAT 29MM
Type: IMPLANTABLE DEVICE | Site: KNEE | Status: FUNCTIONAL
Brand: GENESIS II

## 2024-02-07 DEVICE — LEGION POSTERIOR STABILIZED HIGH                                    FLEX HIGHLY CROSS LINKED                                    POLYETHYLENE SIZE 5-6 9MM
Type: IMPLANTABLE DEVICE | Site: KNEE | Status: FUNCTIONAL
Brand: LEGION

## 2024-02-07 RX ORDER — SODIUM CHLORIDE 9 MG/ML
INJECTION, SOLUTION INTRAVENOUS PRN
Status: DISCONTINUED | OUTPATIENT
Start: 2024-02-07 | End: 2024-02-07 | Stop reason: HOSPADM

## 2024-02-07 RX ORDER — OXYCODONE HYDROCHLORIDE 5 MG/1
5 TABLET ORAL EVERY 6 HOURS PRN
Qty: 28 TABLET | Refills: 0 | Status: SHIPPED | OUTPATIENT
Start: 2024-02-07 | End: 2024-02-07 | Stop reason: HOSPADM

## 2024-02-07 RX ORDER — CIPROFLOXACIN 500 MG/1
500 TABLET, FILM COATED ORAL 2 TIMES DAILY
Status: CANCELLED | OUTPATIENT
Start: 2024-02-07

## 2024-02-07 RX ORDER — FENTANYL CITRATE 50 UG/ML
100 INJECTION, SOLUTION INTRAMUSCULAR; INTRAVENOUS ONCE
Status: COMPLETED | OUTPATIENT
Start: 2024-02-07 | End: 2024-02-07

## 2024-02-07 RX ORDER — DEXAMETHASONE SODIUM PHOSPHATE 10 MG/ML
INJECTION, SOLUTION INTRAMUSCULAR; INTRAVENOUS
Status: COMPLETED | OUTPATIENT
Start: 2024-02-07 | End: 2024-02-07

## 2024-02-07 RX ORDER — CELECOXIB 100 MG/1
200 CAPSULE ORAL ONCE
Status: COMPLETED | OUTPATIENT
Start: 2024-02-07 | End: 2024-02-07

## 2024-02-07 RX ORDER — DIPHENHYDRAMINE HYDROCHLORIDE 50 MG/ML
12.5 INJECTION INTRAMUSCULAR; INTRAVENOUS
Status: DISCONTINUED | OUTPATIENT
Start: 2024-02-07 | End: 2024-02-07 | Stop reason: HOSPADM

## 2024-02-07 RX ORDER — SODIUM CHLORIDE 9 MG/ML
INJECTION, SOLUTION INTRAVENOUS CONTINUOUS PRN
Status: DISCONTINUED | OUTPATIENT
Start: 2024-02-07 | End: 2024-02-07 | Stop reason: SDUPTHER

## 2024-02-07 RX ORDER — SERTRALINE HYDROCHLORIDE 100 MG/1
100 TABLET, FILM COATED ORAL DAILY
Status: CANCELLED | OUTPATIENT
Start: 2024-02-07

## 2024-02-07 RX ORDER — CELECOXIB 200 MG/1
200 CAPSULE ORAL 2 TIMES DAILY
Qty: 60 CAPSULE | Refills: 3 | Status: SHIPPED | OUTPATIENT
Start: 2024-02-07

## 2024-02-07 RX ORDER — CELECOXIB 100 MG/1
200 CAPSULE ORAL ONCE
Status: DISCONTINUED | OUTPATIENT
Start: 2024-02-07 | End: 2024-02-07 | Stop reason: HOSPADM

## 2024-02-07 RX ORDER — ACETAMINOPHEN 500 MG
1000 TABLET ORAL ONCE
Status: COMPLETED | OUTPATIENT
Start: 2024-02-07 | End: 2024-02-07

## 2024-02-07 RX ORDER — ACETAMINOPHEN 325 MG/1
650 TABLET ORAL EVERY 6 HOURS
Status: DISCONTINUED | OUTPATIENT
Start: 2024-02-08 | End: 2024-02-07 | Stop reason: HOSPADM

## 2024-02-07 RX ORDER — ASPIRIN 325 MG
325 TABLET, DELAYED RELEASE (ENTERIC COATED) ORAL 2 TIMES DAILY
Qty: 56 TABLET | Refills: 0 | Status: SHIPPED | OUTPATIENT
Start: 2024-02-07 | End: 2024-03-06

## 2024-02-07 RX ORDER — BUPIVACAINE HYDROCHLORIDE 7.5 MG/ML
INJECTION, SOLUTION EPIDURAL; RETROBULBAR
Status: COMPLETED | OUTPATIENT
Start: 2024-02-07 | End: 2024-02-07

## 2024-02-07 RX ORDER — ATORVASTATIN CALCIUM 40 MG/1
40 TABLET, FILM COATED ORAL NIGHTLY
Status: CANCELLED | OUTPATIENT
Start: 2024-02-07

## 2024-02-07 RX ORDER — TRAMADOL HYDROCHLORIDE 50 MG/1
50 TABLET ORAL EVERY 6 HOURS PRN
Qty: 12 TABLET | Refills: 0 | Status: SHIPPED | OUTPATIENT
Start: 2024-02-07 | End: 2024-02-14

## 2024-02-07 RX ORDER — ROPIVACAINE HYDROCHLORIDE 5 MG/ML
30 INJECTION, SOLUTION EPIDURAL; INFILTRATION; PERINEURAL ONCE
Status: DISCONTINUED | OUTPATIENT
Start: 2024-02-07 | End: 2024-02-07 | Stop reason: HOSPADM

## 2024-02-07 RX ORDER — SODIUM CHLORIDE 0.9 % (FLUSH) 0.9 %
5-40 SYRINGE (ML) INJECTION PRN
Status: DISCONTINUED | OUTPATIENT
Start: 2024-02-07 | End: 2024-02-07 | Stop reason: HOSPADM

## 2024-02-07 RX ORDER — MIDAZOLAM HYDROCHLORIDE 1 MG/ML
2 INJECTION INTRAMUSCULAR; INTRAVENOUS ONCE
Status: COMPLETED | OUTPATIENT
Start: 2024-02-07 | End: 2024-02-07

## 2024-02-07 RX ORDER — DEXAMETHASONE SODIUM PHOSPHATE 10 MG/ML
8 INJECTION INTRAMUSCULAR; INTRAVENOUS ONCE
Status: COMPLETED | OUTPATIENT
Start: 2024-02-07 | End: 2024-02-07

## 2024-02-07 RX ORDER — FAMOTIDINE 20 MG/1
20 TABLET, FILM COATED ORAL 2 TIMES DAILY
Status: CANCELLED | OUTPATIENT
Start: 2024-02-07

## 2024-02-07 RX ORDER — LEVOTHYROXINE SODIUM 0.05 MG/1
50 TABLET ORAL DAILY
Status: CANCELLED | OUTPATIENT
Start: 2024-02-07

## 2024-02-07 RX ORDER — MIDAZOLAM HYDROCHLORIDE 1 MG/ML
INJECTION INTRAMUSCULAR; INTRAVENOUS PRN
Status: DISCONTINUED | OUTPATIENT
Start: 2024-02-07 | End: 2024-02-07 | Stop reason: SDUPTHER

## 2024-02-07 RX ORDER — SODIUM CHLORIDE 0.9 % (FLUSH) 0.9 %
5-40 SYRINGE (ML) INJECTION EVERY 12 HOURS SCHEDULED
Status: DISCONTINUED | OUTPATIENT
Start: 2024-02-07 | End: 2024-02-07 | Stop reason: HOSPADM

## 2024-02-07 RX ORDER — BUPIVACAINE HYDROCHLORIDE 7.5 MG/ML
INJECTION, SOLUTION INTRASPINAL PRN
Status: DISCONTINUED | OUTPATIENT
Start: 2024-02-07 | End: 2024-02-07 | Stop reason: SDUPTHER

## 2024-02-07 RX ORDER — MELOXICAM 7.5 MG/1
7.5 TABLET ORAL DAILY
Status: DISCONTINUED | OUTPATIENT
Start: 2024-02-07 | End: 2024-02-07 | Stop reason: ALTCHOICE

## 2024-02-07 RX ORDER — SODIUM CHLORIDE, SODIUM LACTATE, POTASSIUM CHLORIDE, CALCIUM CHLORIDE 600; 310; 30; 20 MG/100ML; MG/100ML; MG/100ML; MG/100ML
INJECTION, SOLUTION INTRAVENOUS CONTINUOUS
Status: DISCONTINUED | OUTPATIENT
Start: 2024-02-07 | End: 2024-02-07 | Stop reason: HOSPADM

## 2024-02-07 RX ORDER — ASPIRIN 325 MG
325 TABLET, DELAYED RELEASE (ENTERIC COATED) ORAL 2 TIMES DAILY
Status: DISCONTINUED | OUTPATIENT
Start: 2024-02-08 | End: 2024-02-07 | Stop reason: HOSPADM

## 2024-02-07 RX ORDER — FENTANYL CITRATE 50 UG/ML
INJECTION, SOLUTION INTRAMUSCULAR; INTRAVENOUS
Status: COMPLETED | OUTPATIENT
Start: 2024-02-07 | End: 2024-02-07

## 2024-02-07 RX ORDER — ROPIVACAINE HYDROCHLORIDE 5 MG/ML
INJECTION, SOLUTION EPIDURAL; INFILTRATION; PERINEURAL
Status: COMPLETED | OUTPATIENT
Start: 2024-02-07 | End: 2024-02-07

## 2024-02-07 RX ORDER — HYDRALAZINE HYDROCHLORIDE 20 MG/ML
10 INJECTION INTRAMUSCULAR; INTRAVENOUS
Status: DISCONTINUED | OUTPATIENT
Start: 2024-02-07 | End: 2024-02-07 | Stop reason: HOSPADM

## 2024-02-07 RX ORDER — ONDANSETRON 4 MG/1
4 TABLET, ORALLY DISINTEGRATING ORAL EVERY 8 HOURS PRN
Status: DISCONTINUED | OUTPATIENT
Start: 2024-02-07 | End: 2024-02-07 | Stop reason: HOSPADM

## 2024-02-07 RX ORDER — FENTANYL CITRATE 50 UG/ML
INJECTION, SOLUTION INTRAMUSCULAR; INTRAVENOUS PRN
Status: DISCONTINUED | OUTPATIENT
Start: 2024-02-07 | End: 2024-02-07 | Stop reason: SDUPTHER

## 2024-02-07 RX ORDER — BUDESONIDE AND FORMOTEROL FUMARATE DIHYDRATE 160; 4.5 UG/1; UG/1
2 AEROSOL RESPIRATORY (INHALATION) 2 TIMES DAILY
Status: CANCELLED | OUTPATIENT
Start: 2024-02-07

## 2024-02-07 RX ORDER — SCOLOPAMINE TRANSDERMAL SYSTEM 1 MG/1
1 PATCH, EXTENDED RELEASE TRANSDERMAL
Status: DISCONTINUED | OUTPATIENT
Start: 2024-02-07 | End: 2024-02-07 | Stop reason: HOSPADM

## 2024-02-07 RX ORDER — LIDOCAINE HYDROCHLORIDE 20 MG/ML
INJECTION, SOLUTION INTRAVENOUS PRN
Status: DISCONTINUED | OUTPATIENT
Start: 2024-02-07 | End: 2024-02-07 | Stop reason: SDUPTHER

## 2024-02-07 RX ORDER — VANCOMYCIN HYDROCHLORIDE 1 G/20ML
INJECTION, POWDER, LYOPHILIZED, FOR SOLUTION INTRAVENOUS PRN
Status: DISCONTINUED | OUTPATIENT
Start: 2024-02-07 | End: 2024-02-07 | Stop reason: HOSPADM

## 2024-02-07 RX ORDER — ATENOLOL 50 MG/1
50 TABLET ORAL NIGHTLY
Status: CANCELLED | OUTPATIENT
Start: 2024-02-07

## 2024-02-07 RX ORDER — HYDROMORPHONE HYDROCHLORIDE 1 MG/ML
0.25 INJECTION, SOLUTION INTRAMUSCULAR; INTRAVENOUS; SUBCUTANEOUS EVERY 5 MIN PRN
Status: DISCONTINUED | OUTPATIENT
Start: 2024-02-07 | End: 2024-02-07 | Stop reason: HOSPADM

## 2024-02-07 RX ORDER — LABETALOL HYDROCHLORIDE 5 MG/ML
10 INJECTION, SOLUTION INTRAVENOUS
Status: DISCONTINUED | OUTPATIENT
Start: 2024-02-07 | End: 2024-02-07 | Stop reason: HOSPADM

## 2024-02-07 RX ORDER — MEPERIDINE HYDROCHLORIDE 25 MG/ML
12.5 INJECTION INTRAMUSCULAR; INTRAVENOUS; SUBCUTANEOUS EVERY 5 MIN PRN
Status: DISCONTINUED | OUTPATIENT
Start: 2024-02-07 | End: 2024-02-07 | Stop reason: HOSPADM

## 2024-02-07 RX ORDER — GABAPENTIN 100 MG/1
100 CAPSULE ORAL 3 TIMES DAILY
Qty: 90 CAPSULE | Refills: 0 | Status: SHIPPED | OUTPATIENT
Start: 2024-02-07 | End: 2024-03-08

## 2024-02-07 RX ORDER — IPRATROPIUM BROMIDE AND ALBUTEROL SULFATE 2.5; .5 MG/3ML; MG/3ML
1 SOLUTION RESPIRATORY (INHALATION)
Status: DISCONTINUED | OUTPATIENT
Start: 2024-02-07 | End: 2024-02-07 | Stop reason: HOSPADM

## 2024-02-07 RX ORDER — MORPHINE SULFATE 2 MG/ML
2 INJECTION, SOLUTION INTRAMUSCULAR; INTRAVENOUS
Status: DISCONTINUED | OUTPATIENT
Start: 2024-02-07 | End: 2024-02-07 | Stop reason: HOSPADM

## 2024-02-07 RX ORDER — PROCHLORPERAZINE EDISYLATE 5 MG/ML
5 INJECTION INTRAMUSCULAR; INTRAVENOUS
Status: DISCONTINUED | OUTPATIENT
Start: 2024-02-07 | End: 2024-02-07 | Stop reason: HOSPADM

## 2024-02-07 RX ORDER — TRAZODONE HYDROCHLORIDE 150 MG/1
150 TABLET ORAL NIGHTLY
Status: CANCELLED | OUTPATIENT
Start: 2024-02-07

## 2024-02-07 RX ORDER — HALOPERIDOL 5 MG/ML
1 INJECTION INTRAMUSCULAR
Status: DISCONTINUED | OUTPATIENT
Start: 2024-02-07 | End: 2024-02-07 | Stop reason: HOSPADM

## 2024-02-07 RX ORDER — TIZANIDINE 4 MG/1
4 TABLET ORAL EVERY 8 HOURS PRN
Status: DISCONTINUED | OUTPATIENT
Start: 2024-02-07 | End: 2024-02-07 | Stop reason: HOSPADM

## 2024-02-07 RX ORDER — ONDANSETRON 2 MG/ML
4 INJECTION INTRAMUSCULAR; INTRAVENOUS EVERY 6 HOURS PRN
Status: DISCONTINUED | OUTPATIENT
Start: 2024-02-07 | End: 2024-02-07 | Stop reason: HOSPADM

## 2024-02-07 RX ORDER — OXYCODONE HYDROCHLORIDE 5 MG/1
10 TABLET ORAL EVERY 4 HOURS PRN
Status: DISCONTINUED | OUTPATIENT
Start: 2024-02-07 | End: 2024-02-07 | Stop reason: HOSPADM

## 2024-02-07 RX ORDER — PROPOFOL 10 MG/ML
INJECTION, EMULSION INTRAVENOUS CONTINUOUS PRN
Status: DISCONTINUED | OUTPATIENT
Start: 2024-02-07 | End: 2024-02-07 | Stop reason: SDUPTHER

## 2024-02-07 RX ORDER — TRAMADOL HYDROCHLORIDE 50 MG/1
50 TABLET ORAL EVERY 8 HOURS PRN
Status: DISCONTINUED | OUTPATIENT
Start: 2024-02-07 | End: 2024-02-07 | Stop reason: HOSPADM

## 2024-02-07 RX ORDER — DEXTROSE AND SODIUM CHLORIDE 5; .45 G/100ML; G/100ML
INJECTION, SOLUTION INTRAVENOUS CONTINUOUS
Status: DISCONTINUED | OUTPATIENT
Start: 2024-02-07 | End: 2024-02-07 | Stop reason: HOSPADM

## 2024-02-07 RX ORDER — DEXAMETHASONE SODIUM PHOSPHATE 10 MG/ML
INJECTION, SOLUTION INTRAMUSCULAR; INTRAVENOUS
Status: COMPLETED
Start: 2024-02-07 | End: 2024-02-07

## 2024-02-07 RX ORDER — HYDROMORPHONE HYDROCHLORIDE 1 MG/ML
0.5 INJECTION, SOLUTION INTRAMUSCULAR; INTRAVENOUS; SUBCUTANEOUS EVERY 5 MIN PRN
Status: DISCONTINUED | OUTPATIENT
Start: 2024-02-07 | End: 2024-02-07 | Stop reason: HOSPADM

## 2024-02-07 RX ADMIN — ACETAMINOPHEN 1000 MG: 500 TABLET ORAL at 07:10

## 2024-02-07 RX ADMIN — SODIUM CHLORIDE, POTASSIUM CHLORIDE, SODIUM LACTATE AND CALCIUM CHLORIDE: 600; 310; 30; 20 INJECTION, SOLUTION INTRAVENOUS at 07:09

## 2024-02-07 RX ADMIN — FENTANYL CITRATE 25 MCG: 50 INJECTION, SOLUTION INTRAMUSCULAR; INTRAVENOUS at 09:09

## 2024-02-07 RX ADMIN — BUPIVACAINE HYDROCHLORIDE 15 MG: 7.5 INJECTION, SOLUTION EPIDURAL; RETROBULBAR at 09:07

## 2024-02-07 RX ADMIN — DEXAMETHASONE SODIUM PHOSPHATE 10 MG: 10 INJECTION, SOLUTION INTRAMUSCULAR; INTRAVENOUS at 07:37

## 2024-02-07 RX ADMIN — FENTANYL CITRATE 25 MCG: 50 INJECTION, SOLUTION INTRAMUSCULAR; INTRAVENOUS at 09:07

## 2024-02-07 RX ADMIN — CELECOXIB 200 MG: 100 CAPSULE ORAL at 07:09

## 2024-02-07 RX ADMIN — DEXAMETHASONE SODIUM PHOSPHATE 8 MG: 10 INJECTION INTRAMUSCULAR; INTRAVENOUS at 07:10

## 2024-02-07 RX ADMIN — FENTANYL CITRATE 50 MCG: 50 INJECTION INTRAMUSCULAR; INTRAVENOUS at 08:03

## 2024-02-07 RX ADMIN — SODIUM CHLORIDE 3000 MG: 9 INJECTION, SOLUTION INTRAVENOUS at 09:13

## 2024-02-07 RX ADMIN — MIDAZOLAM 1 MG: 1 INJECTION INTRAMUSCULAR; INTRAVENOUS at 08:05

## 2024-02-07 RX ADMIN — ROPIVACAINE HYDROCHLORIDE 20 ML: 5 INJECTION, SOLUTION EPIDURAL; INFILTRATION; PERINEURAL at 07:37

## 2024-02-07 RX ADMIN — FENTANYL CITRATE 50 MCG: 50 INJECTION, SOLUTION INTRAMUSCULAR; INTRAVENOUS at 10:35

## 2024-02-07 RX ADMIN — BUPIVACAINE HYDROCHLORIDE IN DEXTROSE 2 MG: 7.5 INJECTION, SOLUTION SUBARACHNOID at 09:09

## 2024-02-07 RX ADMIN — PROPOFOL INJECTABLE EMULSION 50 MCG/KG/MIN: 10 INJECTION, EMULSION INTRAVENOUS at 09:11

## 2024-02-07 RX ADMIN — MIDAZOLAM 1 MG: 1 INJECTION INTRAMUSCULAR; INTRAVENOUS at 09:48

## 2024-02-07 RX ADMIN — MIDAZOLAM 1 MG: 1 INJECTION INTRAMUSCULAR; INTRAVENOUS at 09:00

## 2024-02-07 RX ADMIN — SODIUM CHLORIDE: 900 INJECTION, SOLUTION INTRAVENOUS at 09:38

## 2024-02-07 RX ADMIN — LIDOCAINE HYDROCHLORIDE 40 MG: 20 INJECTION, SOLUTION INTRAVENOUS at 09:11

## 2024-02-07 RX ADMIN — TIZANIDINE 4 MG: 4 TABLET ORAL at 13:29

## 2024-02-07 RX ADMIN — TRAMADOL HYDROCHLORIDE 50 MG: 50 TABLET ORAL at 13:29

## 2024-02-07 ASSESSMENT — PAIN DESCRIPTION - DESCRIPTORS: DESCRIPTORS: DISCOMFORT;ACHING

## 2024-02-07 ASSESSMENT — PAIN SCALES - GENERAL
PAINLEVEL_OUTOF10: 7
PAINLEVEL_OUTOF10: 0

## 2024-02-07 ASSESSMENT — PAIN - FUNCTIONAL ASSESSMENT: PAIN_FUNCTIONAL_ASSESSMENT: NONE - DENIES PAIN

## 2024-02-07 ASSESSMENT — PAIN DESCRIPTION - LOCATION: LOCATION: KNEE

## 2024-02-07 ASSESSMENT — PAIN DESCRIPTION - ORIENTATION: ORIENTATION: RIGHT

## 2024-02-07 NOTE — ANESTHESIA POSTPROCEDURE EVALUATION
Department of Anesthesiology  Postprocedure Note    Patient: Diane Espinoza  MRN: [id number]  YOB: 1960  Date of evaluation: 2/7/2024    Procedure Summary       Date: 02/07/24 Room / Location: [address]    Anesthesia Start: 0901 Anesthesia Stop: 1058    Procedure: RIGHT KNEE TOTAL KNEE ARTHROPLASTY-2/7/24 [employer]CC[employer]OUGH AND [employer]HEW (Right: Knee) Diagnosis:       Right knee DJD      (Right knee DJD [M17.11])    Surgeons: Geoff Oliveira DO Responsible Provider: Gian Emerson MD    Anesthesia Type: spinal, regional ASA Status: 3            Anesthesia Type: No value filed.    Surendra Phase I: Surendra Score: 10    Surendra Phase II: Surendra Score: 10    Anesthesia Post Evaluation    Patient location during evaluation: PACU  Patient participation: complete - patient participated  Level of consciousness: awake  Pain score: 0  Airway patency: patent  Nausea & Vomiting: no nausea and no vomiting  Cardiovascular status: hemodynamically stable  Respiratory status: acceptable  Hydration status: euvolemic  Pain management: adequate        No notable events documented.

## 2024-02-07 NOTE — PROGRESS NOTES
7952 Time out done. 2424-3362 Right adductor nerve block done per Dr. Emerson Toleratshaylee well   
CLINICAL PHARMACY NOTE: MEDS TO BEDS    Total # of Prescriptions Filled: 3   The following medications were delivered to the patient:  Gabapentin 100 mg  Celecoxib 200 mg  Tramadol 50 mg    Additional Documentation:    
Date:2024  Patient Name: Diane Espinoza  MRN: [id number]  : 1960  ROOM #: OR POOL/NONE    Occupational Therapy order received, chart reviewed and evaluation attempted this date.     Patient declined Occupational Therapy evaluation s/p R TKA due to completing ADL's (dressing and toileting) in the present of Physical Therapy. Pt has all AE/DME needed. Pt has a history of L TKA in 2021 and has no additional questions or concerns at this time.      Thank you.   Amanda Haro OTR/L #[id number]                    
Phone call to pt to check with PCP re:abn urine culture., also faxed to Thais Urias NP and Dr. Oliveira/call to [facility], confirmation received x2.  
kneecap and toes pointing to ceiling    At end of session, patient sitting edge of bed with family/friend present and nursing present call light and phone within reach,   all lines and tubes intact, nursing notified.     Patient would benefit from skilled Home Physical Therapy to improve functional independence and quality of life.       Patient's/ family goals   home today    Patient and or family understand(s) diagnosis, prognosis, and plan of care.       Time in  215  Time out  305    Total Treatment Time  30 minutes    Evaluation time includes thorough review of current medical information, gathering information on past medical history/social history and prior level of function, completion of standardized testing/informal observation of tasks, assessment of data, and development of Plan of care and goals.     CPT codes:  Low Complexity PT evaluation (55761)  Therapeutic exercises (73200)   10 minutes  1 unit(s)  Gait Training (86974) 20 minutes 1 unit(s)    Watson Carter, PT

## 2024-02-07 NOTE — DISCHARGE INSTRUCTIONS
[facility] Department of Orthopedic Surgery  [facility] [address] [address]  Dr. Geoff Oliveira      Orthopaedics Discharge Instructions   Weight bearing Status - Weight bearing as tolerated - on right lower Extremity  Pain medication Per Prescriptions  Contact Office for Medication Refill- [phone]  Office can refill pain med every 7 days  If patient discharging to facility then pain control will be continued per facility physician  Ice to operative/injured site for 15-30 minutes of each hour for next 5 days    Recommend that you continue to ice the area 2-3 times per day after this   Elevate operative/injured limb on 2 pillows at home  Goal is to have limb above the heart if able  Continue DVT Prophylaxis (blood clot prevention) as Prescribed: aspirin 325 mg two times daily    Wound care - Can take off the dressing at the surgical site seven days after the date of surgery. Can just peel off. After, do daily dressing changes as needed until the drainage from the surgical site ceases    Follow Up in Office in 2 weeks. Your first post op appointment is often with one of our PAs.     Call the office at [phone] or directions or with any questions.  Watch for these significant complications.  Call physician if they or any other problems occur:  Fever over 101°, redness, swelling or warmth at the operative site  Unrelieved nausea    Foul smelling or cloudy drainage at the operative site   Unrelieved pain    Blood soaked dressing. (Some oozing may be normal)     Numb, pale, blue, cold or tingling extremity    Future Appointments   Date Time Provider Department Center   2/22/2024  9:40 AM Geoff Oliveira DO [facility]   6/12/2024  4:30 PM Thais Urias, KRISTINA - CNP [facility]

## 2024-02-07 NOTE — ANESTHESIA PROCEDURE NOTES
Peripheral Block    Patient location during procedure: procedure area  Reason for block: post-op pain management and at surgeon's request  Start time: 2/7/2024 7:37 AM  End time: 2/7/2024 7:46 AM  Staffing  Performed: anesthesiologist   Anesthesiologist: Gian Emerson MD  Performed by: Gian Emerson MD  Authorized by: Gian Emerson MD    Preanesthetic Checklist  Completed: patient identified, IV checked, site marked, risks and benefits discussed, surgical/procedural consents, equipment checked, pre-op evaluation, timeout performed, anesthesia consent given, oxygen available, monitors applied/VS acknowledged, fire risk safety assessment completed and verbalized and blood product R/B/A discussed and consented  Peripheral Block   Patient position: supine  Prep: ChloraPrep  Provider prep: mask and sterile gloves  Patient monitoring: cardiac monitor, continuous pulse ox, frequent blood pressure checks, IV access, oxygen and responsive to questions  Block type: Femoral  Adductor canal  Laterality: right  Injection technique: single-shot  Guidance: ultrasound guided    Needle   Needle type: insulated echogenic nerve stimulator needle   Needle gauge: 20 G  Needle localization: ultrasound guidance  Needle length: 10 cm  Assessment   Injection assessment: negative aspiration for heme, no paresthesia on injection, local visualized surrounding nerve on ultrasound and no intravascular symptoms  Paresthesia pain: none  Slow fractionated injection: yes  Hemodynamics: stable  Real-time US image taken/store: yes  Outcomes: uncomplicated and patient tolerated procedure well    Medications Administered  dexAMETHasone (DECADRON) (PF) 10 mg/mL injection - Other   10 mg - 2/7/2024 7:37:00 AM  ropivacaine (NAROPIN) injection 0.5% - Perineural   20 mL - 2/7/2024 7:37:00 AM

## 2024-02-07 NOTE — CARE COORDINATION
2/7/2024: SS NOTE:  SS consult for post-op d/c planning & HHC orders noted, pt seen in PAT, d/c plan for pt to return home with [facility], agency accepted referral for home PT for start of care tomorrow 7/8, pt's  to transport her home, pt has dme needed.Electronically signed by BRENT Nunez on 2/7/2024 at 12:13 PM

## 2024-02-07 NOTE — H&P
Updated H&P    Chief Complaint   Patient presents with    Knee Pain       Right knee pain. Wants to discuss surgery          Diane Espinoza returns today for follow-up of her right knee pain. Her pain is worse than when I saw her last. Previous treatment measures were not successful.     Past Medical History        Past Medical History:   Diagnosis Date    Alopecia areata      Anxiety      Carpal tunnel syndrome      Depression      GERD (gastroesophageal reflux disease)      Hyperlipidemia      Obesity, Class III, BMI 40-49.9 (morbid obesity) (H[facility])      Osteoarthritis      PONV (postoperative nausea and vomiting)      PVC (premature ventricular contraction)       controlled w Atenolol    Sleep apnea       CPAP    Thyroid disease           Past Surgical History         Past Surgical History:   Procedure Laterality Date    BREAST SURGERY Left       cyst benign    CARPAL TUNNEL RELEASE Right 05/15/2023     RIGHT CARPAL TUNNEL RELEASE performed by Fina Morgan MD at [facility] OR    CARPAL TUNNEL RELEASE Left 2023     LEFT CARPAL TUNNEL RELEASE performed by Fina Morgan MD at [facility] OR     SECTION         x2    CHOLECYSTECTOMY        COLONOSCOPY   2014     bx done Dr Isaac    ECHO COMPL W DOP COLOR FLOW   2011          KNEE ARTHROSCOPY Left      KNEE ARTHROSCOPY Left 2023     LEFT KNEE ARTHROSCOPY LYSIS OF ADEHSIONS WITH SYNOVECTOMY-23 performed by Geoff Oliveira DO at [facility] OR    NERVE BLOCK Bilateral 2014     lumbar facet #1    NERVE BLOCK   2014     paravertebral facet bilateral lumbar #2    NERVE BLOCK N/A 2014     cerv #1    NERVE BLOCK N/A 2014     cervical epidural  #2    NERVE BLOCK N/A 2014     cerv #3    NERVE BLOCK Bilateral 2020     intra-articular facet joint injection     OTHER SURGICAL HISTORY   2014     Radiofrequency right lumbar L3-4, L4-5, L5-S1    OTHER SURGICAL HISTORY Left 2014     lumbar

## 2024-02-07 NOTE — OP NOTE
Operative Note      Patient: Diane Espinoza  YOB: 1960  MRN: [id number]    Date of Procedure: 2/7/2024    Pre-Op Diagnosis Codes:     * Right knee DJD [M17.11]    Post-Op Diagnosis: Same       Procedure(s):  RIGHT KNEE TOTAL KNEE ARTHROPLASTY-2/7/24 JUSTO    Surgeon(s):  Geoff Oliveira DO    Assistant:   Resident: Addy Stringer DO; Rg Grajeda DO; Eric Mulligan DO    Anesthesia: General    Estimated Blood Loss (mL): less than 100     Complications: None    Specimens:   ID Type Source Tests Collected by Time Destination   A : right knee Bone Bone SURGICAL PATHOLOGY Geoff Oliveira DO 2/7/2024 1026        Implants:  Implant Name Type Inv. Item Serial No.  Lot No. LRB No. Used Action   CEMENT BNE 20ML 40GM FULL DOSE PMMA W/O ANTIBIO M VISC - [id number]  CEMENT BNE 20ML 40GM FULL DOSE PMMA W/O ANTIBIO M VISC  [employer] [facility] [id number] Right 1 Implanted   INSERT TIB SZ 5-6 THK9MM KNEE XLPE POST STBL HI FLX LEGION - [id number]  INSERT TIB SZ 5-6 THK9MM KNEE XLPE POST STBL HI FLX LEGION  [employer]- [id number] Right 1 Implanted   COMPONENT FEM SZ 5 R KNEE OXINIUM POST STBL DARIAN LEGION - [id number]  COMPONENT FEM SZ 5 R KNEE OXINIUM POST STBL DARIAN LEGION  [facility] [id number] Right 1 Implanted   BASEPLATE TIB SZ 5 AP52MM ML74MM THK2.3MM R KNEE TI ALLY NP - [id number]  BASEPLATE TIB SZ 5 AP52MM ML74MM THK2.3MM R KNEE TI ALLY NP  [facility] [id number] Right 1 Implanted   COMPONENT PAT TGU54KG THK7.5MM KNEE POLY RND RESURF GEN II - [id number]  COMPONENT PAT TFU21OC THK7.5MM KNEE POLY RND RESURF GEN II  [facility] [id number] Right 1 Implanted         Drains: * No LDAs found *    Findings: as above        Detailed Description of Procedure:   below    Department of Orthopedic Surgery  Operative Report        Pre-operative Diagnosis:  Right Knee Osteoarthritis    Post-operative Diagnosis:

## 2024-02-07 NOTE — ANESTHESIA PROCEDURE NOTES
Spinal Block    Patient location during procedure: OR  End time: 2/7/2024 9:09 AM  Reason for block: primary anesthetic  Staffing  Performed: other anesthesia staff   Anesthesiologist: Gian Emerson MD  Resident/CRNA: Dave Bee APRN - RENA  Other anesthesia staff: Lidia Sparks RN  Performed by: Lidia Sparks RN  Authorized by: Gian Emerson MD    Spinal Block  Patient position: sitting  Prep: ChloraPrep  Patient monitoring: continuous pulse ox and frequent blood pressure checks  Approach: midline  Location: L3/L4  Provider prep: mask and sterile gloves  Local infiltration: lidocaine  Needle  Needle type: Pencan   Needle gauge: 25 G  Needle length: 3.5 in  Assessment  Swirl obtained: Yes  CSF: clear  Attempts: 1  Hemodynamics: stable  Preanesthetic Checklist  Completed: patient identified, IV checked, site marked, risks and benefits discussed, surgical/procedural consents, equipment checked, pre-op evaluation, timeout performed, anesthesia consent given, oxygen available, monitors applied/VS acknowledged, fire risk safety assessment completed and verbalized and blood product R/B/A discussed and consented

## 2024-02-08 ENCOUNTER — TELEPHONE (OUTPATIENT)
Dept: ORTHOPEDIC SURGERY | Age: 64
End: 2024-02-08

## 2024-02-08 DIAGNOSIS — G89.18 POST-OP PAIN: Primary | ICD-10-CM

## 2024-02-08 RX ORDER — TRAMADOL HYDROCHLORIDE 50 MG/1
50 TABLET ORAL EVERY 6 HOURS PRN
Qty: 56 TABLET | Refills: 0 | Status: SHIPPED | OUTPATIENT
Start: 2024-02-08 | End: 2024-02-15

## 2024-02-08 NOTE — TELEPHONE ENCOUNTER
.Last appointment 1/8/2024  Next appointment   Future Appointments   Date Time Provider Department Center   2/22/2024  9:40 AM Geoff Oliveira DO [facility]   6/12/2024  4:30 PM Thais Urias APRN - [facility]      Last refill 02/07/2024  DOS: 02/07/2024      Patient called in requesting refill of:    traMADol (ULTRAM) 50 MG tablet     [facility] - [place] [address] - P [phone] - F [phone]

## 2024-02-12 LAB — SURGICAL PATHOLOGY REPORT: NORMAL

## 2024-02-16 DIAGNOSIS — Z96.651 STATUS POST TOTAL RIGHT KNEE REPLACEMENT: Primary | ICD-10-CM

## 2024-02-22 ENCOUNTER — OFFICE VISIT (OUTPATIENT)
Dept: ORTHOPEDIC SURGERY | Age: 64
End: 2024-02-22

## 2024-02-22 VITALS — WEIGHT: 270 LBS | TEMPERATURE: 98 F | BODY MASS INDEX: 42.38 KG/M2 | HEIGHT: 67 IN

## 2024-02-22 DIAGNOSIS — K21.9 GASTROESOPHAGEAL REFLUX DISEASE, UNSPECIFIED WHETHER ESOPHAGITIS PRESENT: ICD-10-CM

## 2024-02-22 DIAGNOSIS — Z96.651 S/P TOTAL KNEE ARTHROPLASTY, RIGHT: ICD-10-CM

## 2024-02-22 PROCEDURE — 99024 POSTOP FOLLOW-UP VISIT: CPT | Performed by: ORTHOPAEDIC SURGERY

## 2024-02-22 RX ORDER — TRAMADOL HYDROCHLORIDE 50 MG/1
50 TABLET ORAL EVERY 6 HOURS PRN
Qty: 12 TABLET | Refills: 0 | Status: SHIPPED | OUTPATIENT
Start: 2024-02-22 | End: 2024-02-29

## 2024-02-22 RX ORDER — FAMOTIDINE 20 MG/1
20 TABLET, FILM COATED ORAL 2 TIMES DAILY
Qty: 180 TABLET | Refills: 1 | OUTPATIENT
Start: 2024-02-22

## 2024-02-22 NOTE — PROGRESS NOTES
Subjective:     Post-Operative week: 2 Status Post right Total Knee Arthroplasty, surgery date 2/7/24. Systemic or Specific Complaints:none. She is ambulating with cane. She is taking Tramadol as needed for pain. She is doing well.    Objective:     General: alert, appears stated age and cooperative   Wound: Wound clean and dry no evidence of infection., No Erythema, No Edema and No Drainage   Motion: Flexion: 0 to 105 Degrees   DVT Exam: No evidence of DVT seen on physical exam.  Negative Fabiola's sign.  No cords or calf tenderness.  No significant calf/ankle edema.     Imaging:  Xrays:  No signs of fracture, there is good alignment, and no signs of aseptic loosening.   Radiographic findings reviewed with patient    Assessment:     Encounter Diagnosis   Name Primary?    S/P total knee arthroplasty, right       Doing well postoperatively.     Plan:     Continues current post-op course, staples were removed at today's appointment  Patient is to continue taking enteric coated aspirin 81 mg, 1 tablet twice daily.    Patient is to continue wearing FARIHA hose, on during the day and off at night.    Outpatient physical therapy is to begin immediately.    No bathing/swimming/hot tub for two weeks or until incision is completely closed.    We will see the patient back in 4 weeks for repeat xray and evaluation.  Please call office with any questions or concerns at [phone]   Tramadol 50mg

## 2024-02-26 ENCOUNTER — EVALUATION (OUTPATIENT)
Dept: PHYSICAL THERAPY | Age: 64
End: 2024-02-26
Payer: COMMERCIAL

## 2024-02-26 DIAGNOSIS — Z96.651 S/P TOTAL KNEE ARTHROPLASTY, RIGHT: Primary | ICD-10-CM

## 2024-02-26 PROCEDURE — 97162 PT EVAL MOD COMPLEX 30 MIN: CPT | Performed by: PHYSICAL THERAPIST

## 2024-02-26 PROCEDURE — 97110 THERAPEUTIC EXERCISES: CPT | Performed by: PHYSICAL THERAPIST

## 2024-02-26 NOTE — PROGRESS NOTES
[facility] OUTPATIENT REHABILITATION  PHYSICAL THERAPY INITIAL EVALUATION         Date:  2024   Patient: Diane Espinoza  : 1960  MRN: [id number]  Referring Provider[place] [address]     Medical Diagnosis:     Z96.651 (ICD-10-CM) - S/P total knee arthroplasty, right    Physician Order: Eval and Treat     SUBJECTIVE:     Surgical procedure: R TKA    Date of surgery: 2024    Services provided following surgery: home care    History: TKA due to DJD.    Chief complaint:  Right sciatic pain      Pain:   Current: 4/10       Symptom Type / Quality: aching  Location:: Knee: global     Imaging results: XR KNEE RIGHT (1-2 VIEWS)    Result Date: 2024  EXAMINATION: TWO XRAY VIEWS OF THE RIGHT KNEE 2024 11:04 am COMPARISON: The previous study performed 2024 HISTORY: ORDERING SYSTEM PROVIDED HISTORY: Post Op TECHNOLOGIST PROVIDED HISTORY: Of operative side while in recovery room. Reason for exam:->Post Op FINDINGS: The patient is status post total knee arthroplasty.  The bones and orthopedic hardware are in good alignment and position.  No abnormal radiolucencies are identified at the bone/orthopedic hardware interfaces.  No new osseous abnormality is seen.  Soft tissue air/gas is noted involving the ventral aspects of the knee.  Soft tissue swelling is noted diffusely about the knee. Overlying surgical staples are present.     1. Status post right total knee arthroplasty with no radiographic evidence of hardware complication.     XR CHEST (2 VW)    Result Date: 2024  EXAMINATION: TWO XRAY VIEWS OF THE CHEST 2024 11:34 am COMPARISON:  HISTORY: ORDERING SYSTEM PROVIDED HISTORY: Pre-op exam TECHNOLOGIST PROVIDED HISTORY: Pre Op Reason for exam:->Pre OP FINDINGS: The lungs are without acute focal process.  There is no effusion or pneumothorax. The cardiomediastinal silhouette is without acute process. The osseous

## 2024-02-26 NOTE — PROGRESS NOTES
Physical Therapy Daily Treatment Note    Date: 2024  Patient Name: Diane Espinoza  : 1960   MRN: [id number]  DOInjury: -  DOSx: 2024  Referring Provider: [place] [address]     Medical Diagnosis:     Z96.651 (ICD-10-CM) - S/P total knee arthroplasty, right    Diane is doing well after R TKA 2024. She has good ROM with weak knee extension. We will emphasize strength.     X = TO BE PERFORMED NEXT VISIT  > = PROGRESS TO THIS    S: See eval  O: Discussed anatomy, physiology, body mechanics, principles of loading, and progressive loading/activity.  Reviewed home exercise program extensively along with instructions for ice and elevation; written copy provided.     Access Code: [email or web address]  URL: [email or web address]  Date: 2024  Prepared by: Roger Almeida    Exercises  - Supine Heel Slides  - 2 x daily - 7 x weekly - 2 sets - 25 reps  - Small Range Straight Leg Raise (Mirrored)  - 2 x daily - 7 x weekly - 2 sets - 10 reps - 2 sec hold  - Supine Knee Extension Stretch on Towel Roll (Mirrored)  - 2 x daily - 7 x weekly - 2-3 minutes hold  - Seated Long Arc Quad  - 2 x daily - 7 x weekly - 2 sets - 15 reps - 5 sec hold    Time  2973-9840       Visit  1 Repeat outcome measure at mid point and end.    Pain    Pain with activity 4/10     ROM  105/-9     Modalities          MO   Manual      Stretching knee flexion   MT   Stretching       Patella mobs   TE   Prone hangs   TE   Heel props   TE   Knee flex stretch-seated   TE   Prone self flexion stretch   TE   Exercise       Nustep    TE   Quad sets   TE   Heel slides 2 x 25  TE   SLR 2 x 10  TE   LAQ   TE   Marching   TA   Squat    TA   Step-ups - FWD  x  TA   Step-ups - LAT x  TA   Step-ups - BWD    TA   Step up and over reciprocally    TA   [x] TG  [] Leg Press 2-leg x  TE   [] TG  [] Leg Press 1-leg   TE   CR    TE   Knee Extension Machine   TE   Marching gait x  NR   Side stepping x  NR               A:

## 2024-02-29 ENCOUNTER — TREATMENT (OUTPATIENT)
Dept: PHYSICAL THERAPY | Age: 64
End: 2024-02-29
Payer: COMMERCIAL

## 2024-02-29 DIAGNOSIS — Z96.651 S/P TOTAL KNEE ARTHROPLASTY, RIGHT: Primary | ICD-10-CM

## 2024-02-29 PROCEDURE — 97530 THERAPEUTIC ACTIVITIES: CPT

## 2024-02-29 PROCEDURE — 97110 THERAPEUTIC EXERCISES: CPT

## 2024-02-29 PROCEDURE — 97016 VASOPNEUMATIC DEVICE THERAPY: CPT

## 2024-02-29 NOTE — PROGRESS NOTES
TG  [] Leg Press 1-leg   TE   CR  X 20   TE   Knee Extension Machine   TE   Marching gait x  NR   Side stepping x  NR               A: Tolerated well.  Pt able to tolerate all ex's .  P: Continue with rehab plan  Barrington Gonzalez PTA    Treatment Charges: Mins Units   Initial Evaluation     Re-Evaluation     Ther Exercise         TE 15 1   Manual Therapy     MT     Ther Activities        TA 15 1   Gait Training          GT     Neuro Re-education NR     Modalities /Vasopneumatic Device   15 1   Non-Billable Service Time     Other     Total Time/Units 45 3

## 2024-03-04 ENCOUNTER — TREATMENT (OUTPATIENT)
Dept: PHYSICAL THERAPY | Age: 64
End: 2024-03-04
Payer: COMMERCIAL

## 2024-03-04 DIAGNOSIS — Z96.651 S/P TOTAL KNEE ARTHROPLASTY, RIGHT: Primary | ICD-10-CM

## 2024-03-04 PROCEDURE — 97110 THERAPEUTIC EXERCISES: CPT

## 2024-03-04 PROCEDURE — 97016 VASOPNEUMATIC DEVICE THERAPY: CPT

## 2024-03-04 PROCEDURE — 97530 THERAPEUTIC ACTIVITIES: CPT

## 2024-03-04 NOTE — PROGRESS NOTES
[] Leg Press 1-leg   TE   CR  L 17 2 X  15  TE   Knee Extension Machine   TE   Marching gait x  NR   Side stepping x  NR               A: Tolerated well.  Pt able to tolerate all ex's .  P: Continue with rehab plan  Barrington Gonzalez PTA    Treatment Charges: Mins Units   Initial Evaluation     Re-Evaluation     Ther Exercise         TE 15 1   Manual Therapy     MT     Ther Activities        TA 15 1   Gait Training          GT     Neuro Re-education NR     Modalities /Vasopneumatic Device   15 1   Non-Billable Service Time     Other     Total Time/Units 45 3

## 2024-03-07 ENCOUNTER — TREATMENT (OUTPATIENT)
Dept: PHYSICAL THERAPY | Age: 64
End: 2024-03-07

## 2024-03-07 DIAGNOSIS — Z96.651 S/P TOTAL KNEE ARTHROPLASTY, RIGHT: Primary | ICD-10-CM

## 2024-03-07 NOTE — PROGRESS NOTES
Physical Therapy Daily Treatment Note    Date: 3/7/2024  Patient Name: Diane Espinoza  : 1960   MRN: [id number]  DOInjury: -  DOSx: 2024  Referring Provider: [place] [address]     Medical Diagnosis:     Z96.651 (ICD-10-CM) - S/P total knee arthroplasty, right    Diane is doing well after R TKA 2024. She has good ROM with weak knee extension. We will emphasize strength.     X = TO BE PERFORMED NEXT VISIT  > = PROGRESS TO THIS    S:  pt reports no new changes since last visit .   O: Discussed anatomy, physiology, body mechanics, principles of loading, and progressive loading/activity.  Reviewed home exercise program extensively along with instructions for ice and elevation; written copy provided.     Access Code: [email or web address]  URL: [email or web address]  Date: 2024  Prepared by: Roger Almeida    Exercises  - Supine Heel Slides  - 2 x daily - 7 x weekly - 2 sets - 25 reps  - Small Range Straight Leg Raise (Mirrored)  - 2 x daily - 7 x weekly - 2 sets - 10 reps - 2 sec hold  - Supine Knee Extension Stretch on Towel Roll (Mirrored)  - 2 x daily - 7 x weekly - 2-3 minutes hold  - Seated Long Arc Quad  - 2 x daily - 7 x weekly - 2 sets - 15 reps - 5 sec hold    Time  7260-6650 am      Visit   visit  Repeat outcome measure at mid point and end.    Pain    Pain with activity 2/10     ROM   110 /-  -5  3/8/2024     Modalities       Vasopneumatic Device X 15 min  Post ex's  MO   Manual      Stretching knee flexion   MT   Stretching       Patella mobs X 20-30   TE   Prone hangs   TE   Heel props in sitting  X 3 min   TE   Knee flex stretch-seated   TE   Prone self flexion stretch   TE   Exercise       Nustep    TE   Recumbent bike  X 5 min seat no. 3      Quad sets   TE   Heel slides  TE   SLR  TE   LAQ  TE   Marching  TA   Squat    TA   Step-ups - FWD  6\" x 20    TA   Step-ups - LAT 6\" x 20   TA   Step-ups - BWD  6\" x 20  TA   Step up and over

## 2024-03-11 ENCOUNTER — TREATMENT (OUTPATIENT)
Dept: PHYSICAL THERAPY | Age: 64
End: 2024-03-11
Payer: COMMERCIAL

## 2024-03-11 DIAGNOSIS — Z96.651 S/P TOTAL KNEE ARTHROPLASTY, RIGHT: Primary | ICD-10-CM

## 2024-03-11 PROCEDURE — 97530 THERAPEUTIC ACTIVITIES: CPT

## 2024-03-11 PROCEDURE — 97110 THERAPEUTIC EXERCISES: CPT

## 2024-03-11 NOTE — PROGRESS NOTES
x 20  TA   Step up and over reciprocally    TA   [x] TG  [] Leg Press 2-leg L 17 3 x 15   TE   [] TG  [] Leg Press 1-leg   TE   CR  L 17 2 X  15  TE   Knee Extension Machine   TE   Marching gait x  NR   Side stepping x  NR               A: Tolerated well.  Progression  , pt able to perform full rotations on recumbent bike .   P: Continue with rehab plan  Barrington Gonzalez PTA    Treatment Charges: Mins Units   Initial Evaluation     Re-Evaluation     Ther Exercise         TE 15 1   Manual Therapy     MT     Ther Activities        TA 15 1   Gait Training          GT     Neuro Re-education NR     Modalities /Vasopneumatic Device       Non-Billable Service Time 10 0   Other     Total Time/Units 40 2

## 2024-03-14 ENCOUNTER — TELEPHONE (OUTPATIENT)
Dept: PHYSICAL THERAPY | Age: 64
End: 2024-03-14

## 2024-03-18 ENCOUNTER — TREATMENT (OUTPATIENT)
Dept: PHYSICAL THERAPY | Age: 64
End: 2024-03-18

## 2024-03-18 DIAGNOSIS — Z96.651 S/P TOTAL KNEE ARTHROPLASTY, RIGHT: Primary | ICD-10-CM

## 2024-03-18 PROCEDURE — 97530 THERAPEUTIC ACTIVITIES: CPT

## 2024-03-18 PROCEDURE — 97110 THERAPEUTIC EXERCISES: CPT

## 2024-03-18 NOTE — PROGRESS NOTES
Physical Therapy Daily Treatment Note    Date: 3/18/2024  Patient Name: Diane Espinoza  : 1960   MRN: [id number]  DOInjury: -  DOSx: 2024  Referring Provider: [place] [address]     Medical Diagnosis:     Z96.651 (ICD-10-CM) - S/P total knee arthroplasty, right    Diane is doing well after R TKA 2024. She has good ROM with weak knee extension. We will emphasize strength.     X = TO BE PERFORMED NEXT VISIT  > = PROGRESS TO THIS    S:  pt reports feeling pretty good today  .   O: Discussed anatomy, physiology, body mechanics, principles of loading, and progressive loading/activity.  Reviewed home exercise program extensively along with instructions for ice and elevation; written copy provided.     Access Code: [email or web address]  URL: [email or web address]  Date: 2024  Prepared by: Roger Almeida    Exercises  - Supine Heel Slides  - 2 x daily - 7 x weekly - 2 sets - 25 reps  - Small Range Straight Leg Raise (Mirrored)  - 2 x daily - 7 x weekly - 2 sets - 10 reps - 2 sec hold  - Supine Knee Extension Stretch on Towel Roll (Mirrored)  - 2 x daily - 7 x weekly - 2-3 minutes hold  - Seated Long Arc Quad  - 2 x daily - 7 x weekly - 2 sets - 15 reps - 5 sec hold    Time  0753-6371 am      Visit   visit  Repeat outcome measure at mid point and end.    Pain    Pain with activity 2/10     ROM    118 /-  -5  3/18/2024     Modalities       Vasopneumatic Device Post ex's  MO   Manual      Stretching knee flexion   MT   Stretching       Patella mobs X 20-30   TE   Prone hangs   TE   Heel props in sitting   TE   Knee flex stretch-seated    TE   Prone self flexion stretch   TE   Exercise       Nustep    TE   Recumbent bike  X 5 min seat no. 3    Full rotations      Quad sets   TE   Heel slides  TE   SLR  TE   LAQ  TE   Marching  TA   Squat    TA   Sit to stand  X 20     Step-ups - FWD  6\" x 20    TA   Step-ups - LAT 6\" x 20   TA   Step-ups - BWD  6\" x 20  TA   Step up

## 2024-03-19 RX ORDER — DICLOFENAC SODIUM 75 MG/1
75 TABLET, DELAYED RELEASE ORAL 2 TIMES DAILY
Qty: 60 TABLET | Refills: 3 | Status: SHIPPED | OUTPATIENT
Start: 2024-03-19

## 2024-03-20 ENCOUNTER — TREATMENT (OUTPATIENT)
Dept: PHYSICAL THERAPY | Age: 64
End: 2024-03-20

## 2024-03-20 ENCOUNTER — OFFICE VISIT (OUTPATIENT)
Dept: ORTHOPEDIC SURGERY | Age: 64
End: 2024-03-20

## 2024-03-20 VITALS — BODY MASS INDEX: 42.06 KG/M2 | TEMPERATURE: 98 F | HEIGHT: 67 IN | WEIGHT: 268 LBS

## 2024-03-20 DIAGNOSIS — Z96.651 S/P TOTAL KNEE ARTHROPLASTY, RIGHT: Primary | ICD-10-CM

## 2024-03-20 DIAGNOSIS — G89.18 POST-OP PAIN: ICD-10-CM

## 2024-03-20 PROCEDURE — 99024 POSTOP FOLLOW-UP VISIT: CPT

## 2024-03-20 RX ORDER — TRAMADOL HYDROCHLORIDE 50 MG/1
50 TABLET ORAL EVERY 6 HOURS PRN
Qty: 56 TABLET | Refills: 0 | Status: SHIPPED | OUTPATIENT
Start: 2024-03-20 | End: 2024-03-27

## 2024-03-20 RX ORDER — GABAPENTIN 100 MG/1
100 CAPSULE ORAL 3 TIMES DAILY
Qty: 90 CAPSULE | Refills: 0 | Status: SHIPPED | OUTPATIENT
Start: 2024-03-20 | End: 2024-04-19

## 2024-03-20 NOTE — PROGRESS NOTES
Post-Operative week: 6 Status Post right Total Knee Arthroplasty, DOS: 2/7/24  Systemic or Specific Complaints:No Complaints. She is doing well. She is taking Tramadol for pain. She is ambulating without aid.    Objective:     General: alert, appears stated age and cooperative   Wound: Wound clean and dry no evidence of infection., No Erythema, No Edema and No Drainage   Motion: Flexion: 0 to 115 Degrees   DVT Exam: No evidence of DVT seen on physical exam.  Negative Fabiola's sign.  No cords or calf tenderness.  No significant calf/ankle edema.       Xrays:  No signs of fracture, there is good alignment, and no signs of aseptic loosening.       Assessment:     Encounter Diagnosis   Name Primary?    S/P total knee arthroplasty, right Yes      Doing well postoperatively.     Plan:     Continues current post-op course  Patient is to discontinue taking enteric coated aspirin 325mg.    Patient is to discontinue wearing FARIHA hose.    Continue with outpatient physical therapy.   Follow up 2 months.  Tramadol 50mg #56

## 2024-03-20 NOTE — PROGRESS NOTES
Physical Therapy Daily Treatment Note    Date: 3/20/2024  Patient Name: Diane Espinoza  : 1960   MRN: [id number]  DOInjury: -  DOSx: 2024  Referring Provider: [place] [address]     Medical Diagnosis:     Z96.651 (ICD-10-CM) - S/P total knee arthroplasty, right    Diane is doing well after R TKA 2024. She has good ROM with weak knee extension. We will emphasize strength.     X = TO BE PERFORMED NEXT VISIT  > = PROGRESS TO THIS    S:  pt reports feeling pretty good today  .   O: Discussed anatomy, physiology, body mechanics, principles of loading, and progressive loading/activity.  Reviewed home exercise program extensively along with instructions for ice and elevation; written copy provided.     Access Code: [email or web address]  URL: [email or web address]  Date: 2024  Prepared by: Roger Almeida    Exercises  - Supine Heel Slides  - 2 x daily - 7 x weekly - 2 sets - 25 reps  - Small Range Straight Leg Raise (Mirrored)  - 2 x daily - 7 x weekly - 2 sets - 10 reps - 2 sec hold  - Supine Knee Extension Stretch on Towel Roll (Mirrored)  - 2 x daily - 7 x weekly - 2-3 minutes hold  - Seated Long Arc Quad  - 2 x daily - 7 x weekly - 2 sets - 15 reps - 5 sec hold    Time  6844-7669 am      Visit   visit  Repeat outcome measure at mid point and end.    Pain    Pain with activity 2/10     ROM    118 /-  -5  3/18/2024     Modalities       Vasopneumatic Device Post ex's  MO   Manual      Stretching knee flexion   MT   Stretching       Patella mobs X 20-30   TE   Prone hangs   TE   Heel props in sitting   TE   Knee flex stretch-seated    TE   Prone self flexion stretch   TE   Exercise       Nustep    TE   Recumbent bike  X 5 min seat no. 3    Full rotations      Quad sets   TE   Heel slides  TE   SLR  TE   LAQ  TE   Marching  TA   Squat    TA   Sit to stand  X 20     Step-ups - FWD  6\" x 20    TA   Step-ups - LAT 6\" x 20   TA   Step-ups - BWD  6\" x 20  TA   Step up

## 2024-03-25 ENCOUNTER — TREATMENT (OUTPATIENT)
Dept: PHYSICAL THERAPY | Age: 64
End: 2024-03-25
Payer: COMMERCIAL

## 2024-03-25 DIAGNOSIS — Z96.651 S/P TOTAL KNEE ARTHROPLASTY, RIGHT: Primary | ICD-10-CM

## 2024-03-25 PROCEDURE — 97530 THERAPEUTIC ACTIVITIES: CPT

## 2024-03-25 PROCEDURE — 97110 THERAPEUTIC EXERCISES: CPT

## 2024-03-25 NOTE — PROGRESS NOTES
Physical Therapy Daily Treatment Note    Date: 3/25/2024  Patient Name: Diane Espinoza  : 1960   MRN: [id number]  DOInjury: -  DOSx: 2024  Referring Provider: [place] [address]     Medical Diagnosis:     Z96.651 (ICD-10-CM) - S/P total knee arthroplasty, right    Diane is doing well after R TKA 2024. She has good ROM with weak knee extension. We will emphasize strength.     X = TO BE PERFORMED NEXT VISIT  > = PROGRESS TO THIS    S:  pt reports feeling pretty good today  .   O: Discussed anatomy, physiology, body mechanics, principles of loading, and progressive loading/activity.  Reviewed home exercise program extensively along with instructions for ice and elevation; written copy provided.     Access Code: [email or web address]  URL: [email or web address]  Date: 2024  Prepared by: Roger Almeida    Exercises  - Supine Heel Slides  - 2 x daily - 7 x weekly - 2 sets - 25 reps  - Small Range Straight Leg Raise (Mirrored)  - 2 x daily - 7 x weekly - 2 sets - 10 reps - 2 sec hold  - Supine Knee Extension Stretch on Towel Roll (Mirrored)  - 2 x daily - 7 x weekly - 2-3 minutes hold  - Seated Long Arc Quad  - 2 x daily - 7 x weekly - 2 sets - 15 reps - 5 sec hold    Time  7884-0070 am      Visit   visit  Repeat outcome measure at mid point and end.    Pain    Pain with activity 2/10     ROM    118 /-  -5  3/25/2024     Modalities       Vasopneumatic Device Post ex's  MO   Manual      Stretching knee flexion   MT   Stretching       Patella mobs X 20-30   TE   Prone hangs   TE   Heel props in sitting   TE   Knee flex stretch-seated    TE   Prone self flexion stretch   TE   Exercise       Nustep    TE   Recumbent bike  X 5 min seat no. 3    Full rotations      Quad sets   TE   Heel slides  TE   SLR  TE   LAQ 4# 3 x 15 new   TE   Marching  TA   Squat    TA   Sit to stand  X 20 from low position of high/low mat in [place]  Add arm wt next visit ?     Step-ups - FWD

## 2024-03-27 ENCOUNTER — TREATMENT (OUTPATIENT)
Dept: PHYSICAL THERAPY | Age: 64
End: 2024-03-27
Payer: COMMERCIAL

## 2024-03-27 DIAGNOSIS — Z96.651 S/P TOTAL KNEE ARTHROPLASTY, RIGHT: Primary | ICD-10-CM

## 2024-03-27 PROCEDURE — 97530 THERAPEUTIC ACTIVITIES: CPT

## 2024-03-27 PROCEDURE — 97110 THERAPEUTIC EXERCISES: CPT

## 2024-03-27 NOTE — PROGRESS NOTES
Physical Therapy Daily Treatment Note    Date: 3/27/2024  Patient Name: Diane Espinoza  : 1960   MRN: [id number]  DOInjury: -  DOSx: 2024  Referring Provider: [place] [address]     Medical Diagnosis:     Z96.651 (ICD-10-CM) - S/P total knee arthroplasty, right    Diane is doing well after R TKA 2024. She has good ROM with weak knee extension. We will emphasize strength.     X = TO BE PERFORMED NEXT VISIT  > = PROGRESS TO THIS    S:  pt reports feeling pretty good today  .   O: Discussed anatomy, physiology, body mechanics, principles of loading, and progressive loading/activity.  Reviewed home exercise program extensively along with instructions for ice and elevation; written copy provided.     Access Code: [email or web address]  URL: [email or web address]  Date: 2024  Prepared by: Roger Almeida    Exercises  - Supine Heel Slides  - 2 x daily - 7 x weekly - 2 sets - 25 reps  - Small Range Straight Leg Raise (Mirrored)  - 2 x daily - 7 x weekly - 2 sets - 10 reps - 2 sec hold  - Supine Knee Extension Stretch on Towel Roll (Mirrored)  - 2 x daily - 7 x weekly - 2-3 minutes hold  - Seated Long Arc Quad  - 2 x daily - 7 x weekly - 2 sets - 15 reps - 5 sec hold    Time  5685-2595 am      Visit   visit  Repeat outcome measure at mid point and end.    Pain    Pain with activity 2/10     ROM    118 /- 0  3/25/2024     Modalities       Vasopneumatic Device Post ex's  MO   Manual      Stretching knee flexion   MT   Stretching       Patella mobs X 20-30   TE   Prone hangs   TE   Heel props in sitting   TE   Knee flex stretch-seated    TE   Prone self flexion stretch   TE   Exercise       Nustep    TE   Recumbent bike  X 5 min seat no. 3    Full rotations      Quad sets   TE   Heel slides  TE   SLR  TE   LAQ 4# 3 x 15  TE   Marching  TA   Squat    TA   Sit to stand  X 20 from low position of high/low mat in [place]  Add arm wt next visit ?     Step-ups - FWD  6\"  7\"

## 2024-04-04 ENCOUNTER — TREATMENT (OUTPATIENT)
Dept: PHYSICAL THERAPY | Age: 64
End: 2024-04-04
Payer: COMMERCIAL

## 2024-04-04 DIAGNOSIS — Z96.651 S/P TOTAL KNEE ARTHROPLASTY, RIGHT: Primary | ICD-10-CM

## 2024-04-04 PROCEDURE — 97110 THERAPEUTIC EXERCISES: CPT

## 2024-04-04 PROCEDURE — 97530 THERAPEUTIC ACTIVITIES: CPT

## 2024-04-04 NOTE — PROGRESS NOTES
Physical Therapy Daily Treatment Note    Date: 2024  Patient Name: Diane Espinoza  : 1960   MRN: [id number]  DOInjury: -  DOSx: 2024  Referring Provider: Geoff Oliveira DO   [place] [address]     Medical Diagnosis:     Z96.651 (ICD-10-CM) - S/P total knee arthroplasty, right    Diane is doing well after R TKA 2024. She has good ROM with weak knee extension. We will emphasize strength.     X = TO BE PERFORMED NEXT VISIT  > = PROGRESS TO THIS    S:  pt reports having increase soreness/pain after doing LAQ's last visit .   .   O: Discussed anatomy, physiology, body mechanics, principles of loading, and progressive loading/activity.  Reviewed home exercise program extensively along with instructions for ice and elevation; written copy provided.     Access Code: [email or web address]  URL: [email or web address]  Date: 2024  Prepared by: Roger Almeida    Exercises  - Supine Heel Slides  - 2 x daily - 7 x weekly - 2 sets - 25 reps  - Small Range Straight Leg Raise (Mirrored)  - 2 x daily - 7 x weekly - 2 sets - 10 reps - 2 sec hold  - Supine Knee Extension Stretch on Towel Roll (Mirrored)  - 2 x daily - 7 x weekly - 2-3 minutes hold  - Seated Long Arc Quad  - 2 x daily - 7 x weekly - 2 sets - 15 reps - 5 sec hold    Time  7459-3479 am      Visit  10 / 12 visit  Repeat outcome measure at mid point and end.    Pain    Pain with activity 2/10     ROM    118 /- 0    2024     Modalities       Vasopneumatic Device Post ex's  MO   Manual      Stretching knee flexion   MT   Stretching       Patella mobs X 20-30   TE   Prone hangs   TE   Heel props in sitting   TE   Knee flex stretch-seated    TE   Prone self flexion stretch   TE   Exercise       Nustep    TE   Recumbent bike  X 5 min seat no. 3    Full rotations      Quad sets   TE   Heel slides  TE   SLR  TE   LAQ Pt declines to do any longer  TE   Marching  TA   Squat    TA   Sit to stand  X 20 from low position of high/low mat in

## 2024-04-16 ENCOUNTER — TREATMENT (OUTPATIENT)
Dept: PHYSICAL THERAPY | Age: 64
End: 2024-04-16
Payer: COMMERCIAL

## 2024-04-16 DIAGNOSIS — Z96.651 S/P TOTAL KNEE ARTHROPLASTY, RIGHT: Primary | ICD-10-CM

## 2024-04-16 PROCEDURE — 97110 THERAPEUTIC EXERCISES: CPT

## 2024-04-16 PROCEDURE — 97530 THERAPEUTIC ACTIVITIES: CPT

## 2024-04-16 NOTE — PROGRESS NOTES
Physical Therapy Daily Treatment Note    Date: 2024  Patient Name: Diane Espinoza  : 1960   MRN: [id number]  DOInjury: -  DOSx: 2024  Referring Provider: [place] [address]     Medical Diagnosis:     Z96.651 (ICD-10-CM) - S/P total knee arthroplasty, right    Diane is doing well after R TKA 2024. She has good ROM with weak knee extension. We will emphasize strength.     X = TO BE PERFORMED NEXT VISIT  > = PROGRESS TO THIS    S:  pt reports having increase soreness/pain after doing LAQ's last visit .   .   O: Discussed anatomy, physiology, body mechanics, principles of loading, and progressive loading/activity.  Reviewed home exercise program extensively along with instructions for ice and elevation; written copy provided.     Access Code: [email or web address]  URL: [email or web address]  Date: 2024  Prepared by: Roger Almeida    Exercises  - Supine Heel Slides  - 2 x daily - 7 x weekly - 2 sets - 25 reps  - Small Range Straight Leg Raise (Mirrored)  - 2 x daily - 7 x weekly - 2 sets - 10 reps - 2 sec hold  - Supine Knee Extension Stretch on Towel Roll (Mirrored)  - 2 x daily - 7 x weekly - 2-3 minutes hold  - Seated Long Arc Quad  - 2 x daily - 7 x weekly - 2 sets - 15 reps - 5 sec hold    Time  2496-1707 am      Visit   visit  Repeat outcome measure at mid point and end.    Pain    Pain with activity 2/10     ROM    120 /- 0    2024     Modalities       Vasopneumatic Device Post ex's  MO   Manual      Stretching knee flexion   MT   Stretching       Patella mobs X 20-30   TE   Prone hangs   TE   Heel props in sitting   TE   Knee flex stretch-seated    TE   Prone self flexion stretch   TE   Exercise       Nustep    TE   Recumbent bike  X 5 min seat no. 3    Full rotations      Quad sets   TE   Heel slides  TE   SLR  TE   LAQ Pt declines to do any longer  TE   Marching  TA   Squat    TA   Sit to stand  X 20 from low position of high/low mat in

## 2024-05-17 DIAGNOSIS — Z96.651 S/P TOTAL KNEE ARTHROPLASTY, RIGHT: Primary | ICD-10-CM

## 2024-05-20 ENCOUNTER — ENROLLMENT (OUTPATIENT)
Dept: INTERNAL MEDICINE | Age: 64
End: 2024-05-20

## 2024-05-21 ENCOUNTER — TELEPHONE (OUTPATIENT)
Dept: INTERNAL MEDICINE | Age: 64
End: 2024-05-21

## 2024-05-21 NOTE — TELEPHONE ENCOUNTER
Specialty Medication Service    Date: 5/21/2024  Patient's Name: Diane Espinoza YOB: 1960            _____________________________________________________________________________________________    Spoke with patient to schedule PharmD initial appointment for Specialty Medication Services. Patient scheduled 05/22/24 at 3 pm.  Most recent office visit notes from [facility] in pt chart.    Ruchi Ford CPhT  Pharmacy   Specialty Medication Services   Phone: [phone] option 4    For Pharmacy Admin Tracking Only    Program: [facility]  CPA in place:  No  Recommendation Provided To: Patient/Caregiver: 1 via Telephone  Intervention Detail: Scheduled Appointment  Intervention Accepted By: Patient/Caregiver: 1  Gap Closed?:    Time Spent (min): 15

## 2024-05-22 ENCOUNTER — OFFICE VISIT (OUTPATIENT)
Dept: ORTHOPEDIC SURGERY | Age: 64
End: 2024-05-22
Payer: COMMERCIAL

## 2024-05-22 ENCOUNTER — PHARMACY VISIT (OUTPATIENT)
Dept: INTERNAL MEDICINE | Age: 64
End: 2024-05-22

## 2024-05-22 VITALS — TEMPERATURE: 98 F | BODY MASS INDEX: 42.06 KG/M2 | WEIGHT: 268 LBS | HEIGHT: 67 IN

## 2024-05-22 DIAGNOSIS — Z96.651 S/P TOTAL KNEE ARTHROPLASTY, RIGHT: Primary | ICD-10-CM

## 2024-05-22 DIAGNOSIS — E78.5 DYSLIPIDEMIA: Primary | Chronic | ICD-10-CM

## 2024-05-22 PROCEDURE — 99213 OFFICE O/P EST LOW 20 MIN: CPT

## 2024-05-22 ASSESSMENT — PATIENT HEALTH QUESTIONNAIRE - PHQ9
2. FEELING DOWN, DEPRESSED OR HOPELESS: NOT AT ALL
SUM OF ALL RESPONSES TO PHQ QUESTIONS 1-9: 0
1. LITTLE INTEREST OR PLEASURE IN DOING THINGS: NOT AT ALL
SUM OF ALL RESPONSES TO PHQ QUESTIONS 1-9: 0
SUM OF ALL RESPONSES TO PHQ9 QUESTIONS 1 & 2: 0

## 2024-05-22 ASSESSMENT — PROMIS GLOBAL HEALTH SCALE
SUM OF RESPONSES TO QUESTIONS 3, 6, 7, & 8: 19
IN GENERAL, WOULD YOU SAY YOUR HEALTH IS...[ON A SCALE OF 1 (POOR) TO 5 (EXCELLENT)]: VERY GOOD
IN GENERAL, PLEASE RATE HOW WELL YOU CARRY OUT YOUR USUAL SOCIAL ACTIVITIES (INCLUDES ACTIVITIES AT HOME, AT WORK, AND IN YOUR COMMUNITY, AND RESPONSIBILITIES AS A PARENT, CHILD, SPOUSE, EMPLOYEE, FRIEND, ETC) [ON A SCALE OF 1 (POOR) TO 5 (EXCELLENT)]?: EXCELLENT
IN GENERAL, WOULD YOU SAY YOUR QUALITY OF LIFE IS...[ON A SCALE OF 1 (POOR) TO 5 (EXCELLENT)]: VERY GOOD
SUM OF RESPONSES TO QUESTIONS 2, 4, 5, & 10: 19
IN THE PAST 7 DAYS, HOW WOULD YOU RATE YOUR PAIN ON AVERAGE [ON A SCALE FROM 0 (NO PAIN) TO 10 (WORST IMAGINABLE PAIN)]?: 5
WHO IS THE PERSON COMPLETING THE PROMIS V1.1 SURVEY?: SELF
TO WHAT EXTENT ARE YOU ABLE TO CARRY OUT YOUR EVERYDAY PHYSICAL ACTIVITIES SUCH AS WALKING, CLIMBING STAIRS, CARRYING GROCERIES, OR MOVING A CHAIR [ON A SCALE OF 1 (NOT AT ALL) TO 5 (COMPLETELY)]?: COMPLETELY
HOW IS THE PROMIS V1.1 BEING ADMINISTERED?: TELEPHONE
IN THE PAST 7 DAYS, HOW OFTEN HAVE YOU BEEN BOTHERED BY EMOTIONAL PROBLEMS, SUCH AS FEELING ANXIOUS, DEPRESSED, OR IRRITABLE [ON A SCALE FROM 1 (NEVER) TO 5 (ALWAYS)]?: NEVER
IN GENERAL, HOW WOULD YOU RATE YOUR MENTAL HEALTH, INCLUDING YOUR MOOD AND YOUR ABILITY TO THINK [ON A SCALE OF 1 (POOR) TO 5 (EXCELLENT)]?: EXCELLENT
IN GENERAL, HOW WOULD YOU RATE YOUR PHYSICAL HEALTH [ON A SCALE OF 1 (POOR) TO 5 (EXCELLENT)]?: VERY GOOD

## 2024-05-22 NOTE — PROGRESS NOTES
Specialty Medication Service    Patient's Name: Diane Espinoza YOB: 1960      Diane Espinoza is a 63 y.o. female presenting today for Specialty Medication Service visit. Patient is prescribed SMS formulary medication, Repatha. Medication list updated.    Specialty Medication: Repatha 140 mg/ml SOAJ   Frequency: Every 14 days  Indication: Hyperlipidemia  Initially Diagnosed: Unknown  Additional Therapy:   Fish Oil  Previous Therapy:   Atorvastatin  Rosuvastatin    Specialist:   Thais Urias  [address] [place] [address]  [phone]    Specialist Progress Note Available: Yes  Last Specialist Visit: 3/14/2024 - having side effects with atorvastatin, switch to rosuvastatin; changing to Repatha 5/20/2024     Allergies   Allergen Reactions    Mobic [Meloxicam] Itching     Burning/Itching of there scalp, palms, back and feet.     Morphine Sulfate Itching    Percocet [Oxycodone-Acetaminophen] Itching    Vicodin [Hydrocodone-Acetaminophen] Itching       Past Medical History:   Diagnosis Date    Alopecia areata     Anxiety     Arthritis     Carpal tunnel syndrome     Depression     GERD (gastroesophageal reflux disease)     Hyperlipidemia     Obesity, Class III, BMI 40-49.9 (morbid obesity) (HCC)     Osteoarthritis     PONV (postoperative nausea and vomiting)     PVC (premature ventricular contraction)     controlled w Atenolol    Sleep apnea     CPAP    Thyroid disease       Social History     Tobacco Use    Smoking status: Never    Smokeless tobacco: Never   Substance Use Topics    Alcohol use: Yes     Comment: occasionally     Family History   Problem Relation Age of Onset    Cancer Mother         breast with bone and liver mets    Bipolar Disorder Father     Other Father         Aspiration Pneumonia    Hypertension Sister     Depression Sister     Anxiety Disorder Sister     Heart Disease Brother         MI with stents    Other Brother         Frontal lobe lesion    Breast Cancer

## 2024-05-22 NOTE — PROGRESS NOTES
Connections: Moderately Integrated (6/10/2019)    Social Connection and Isolation Panel [NHANES]     Frequency of Communication with Friends and Family: More than three times a week     Frequency of Social Gatherings with Friends and Family: More than three times a week     Attends [employer] Services: More than 4 times per year     Active Member of Clubs or Organizations: No     Attends Club or Organization Meetings: Never     Marital Status:    Intimate Partner Violence: Not At Risk (6/10/2019)    Humiliation, Afraid, Rape, and Kick questionnaire     Fear of Current or Ex-Partner: No     Emotionally Abused: No     Physically Abused: No     Sexually Abused: No   Housing Stability: Unknown (4/27/2023)    Housing Stability Vital Sign     Unable to Pay for Housing in the Last Year: Not on file     Number of Places Lived in the Last Year: Not on file     Unstable Housing in the Last Year: No       Review of Systems:     Skin: (-) rash,(-) psoriasis,(-) eczema, (-)skin cancer.   Musculoskeletal: (-) fractures,  (-) dislocations,(-) collagen vascular disease, (-) fibromyalgia, (-) multiple sclerosis, (-) muscular dystrophy, (-) RSD,(-) joint pain (-)swelling, (-) joint pain,swelling.  Neurologic: (-) epilepsy, (-)seizures,(-) brain tumor,(-) TIA, (-)stroke, (-)headaches, (-)Parkinson disease,(-) memory loss, (-) LOC.  Cardiovascular: (-) Chest pain, (-) swelling in legs/feet, (-) SOB, (-) cramping in legs/feet with walking.    Subjective:    Constitutional:    The patient is alert and oriented x 3, appears to be stated age and in no distress.   Temp 98 °F (36.7 °C)   Ht 1.702 m (5' 7.01\")   Wt 121.6 kg (268 lb)   LMP 10/13/2016   BMI 41.96 kg/m²     Skin:  Upon inspection: the skin appears warm, dry and intact.  There is a previous scar over the affected area.There is not any cellulitis, lymphedema or cutaneous lesions noted in the lower extremities.   Upon palpation there is no induration noted.

## 2024-06-12 RX ORDER — DICLOFENAC SODIUM 75 MG/1
75 TABLET, DELAYED RELEASE ORAL 2 TIMES DAILY
Qty: 60 TABLET | Refills: 3 | Status: SHIPPED | OUTPATIENT
Start: 2024-06-12

## 2024-06-19 ENCOUNTER — TELEPHONE (OUTPATIENT)
Dept: INTERNAL MEDICINE | Age: 64
End: 2024-06-19

## 2024-06-19 NOTE — TELEPHONE ENCOUNTER
Specialty Medication Service    Date: 6/19/2024  Patient's Name: Diane Espinoza YOB: 1960            _____________________________________________________________________________________________    Email received from [facility] in regard to current SMS formulary medication, Repatha. 2nd SMS override placed.     Ruchi Ford CPhT  Pharmacy   Specialty Medication Services   Phone: [phone] option 4    For Pharmacy Admin Tracking Only    Program: [facility]  CPA in place:  Yes  Recommendation Provided To: Pharmacy: 1  Intervention Detail: Benefit Assistance  Intervention Accepted By: Pharmacy: 1  Gap Closed?:    Time Spent (min): 15

## 2024-07-25 PROBLEM — J44.9 CHRONIC OBSTRUCTIVE PULMONARY DISEASE, UNSPECIFIED (HCC): Status: ACTIVE | Noted: 2024-07-25

## 2024-08-08 NOTE — PROGRESS NOTES
stepping   NR         bike 10 min     A: due to right knee pain certain activities are limited.  Reviewed all HEP   P: discharge with pt to continue HEP  Won Champagne PTA    Treatment Charges: Mins Units   Initial Evaluation     Re-Evaluation     Ther Exercise         TE 30 2   Manual Therapy     MT     Ther Activities        TA 10 1   Gait Training          GT     Neuro Re-education NR     Modalities 15 1   Non-Billable Service Time     Other     Total Time/Units 55 4
No

## 2024-08-19 DIAGNOSIS — Z96.651 S/P TOTAL KNEE ARTHROPLASTY, RIGHT: Primary | ICD-10-CM

## 2024-08-22 ENCOUNTER — OFFICE VISIT (OUTPATIENT)
Dept: ORTHOPEDIC SURGERY | Age: 64
End: 2024-08-22
Payer: COMMERCIAL

## 2024-08-22 VITALS — BODY MASS INDEX: 41.59 KG/M2 | WEIGHT: 265 LBS | HEIGHT: 67 IN | TEMPERATURE: 98.6 F

## 2024-08-22 DIAGNOSIS — Z96.651 S/P TOTAL KNEE ARTHROPLASTY, RIGHT: Primary | ICD-10-CM

## 2024-08-22 PROCEDURE — 99213 OFFICE O/P EST LOW 20 MIN: CPT | Performed by: ORTHOPAEDIC SURGERY

## 2024-08-22 NOTE — PROGRESS NOTES
2/4 at the ankles with strong extensor hallicus longus motor strength bilaterally. Sensory to both feet is intact to all sensory roots.    Cardiovascular:  The vascular exam is normal and is well perfused to distal extremities.  Distal pulses DP/PT: R. 2+; L. 2+.  There is cap refill noted less than two seconds in all digits. There is not edema of the bilateral lower extremities.  There is not varicosities noted in the distal extremities.      Lymph:  Upon palpation,  there is no lymphadenopathy noted in bilateral lower extremities.      Musculoskeletal:    Lumbar exam:  On visual inspection, there is not deformity of the spine.   full range of motion, no tenderness, palpable spasm or pain on motion. Special tests: Straight Leg Raise negative, Albino testnegative.    Hip exam:  Upon inspection, there is not deformity noted.  Upon palpation there is not tenderness.  ROM: is   full and symmetrical.   Strength: Hip Flexors 5/5; Hip Abductors 5/5; Hip Adduction 5/5.     Knee exam:  Right knee exam shows;  range of motion of R. Knee is 0 to 120, and L. Knee is 0 to 120. The patient does not have  pain on motion, effusion is mild, there is not tenderness over the  global region, there are not any masses, there is not ligamentous instability, there is not  deformity noted.  Knee exam: bilateral positive for moderate crepitations, some mild tenderness laxity is not noted with stress.      R. Knee:  Lachman's negative, Anterior Drawer negative, Posterior Drawer negative  Gloria's negative, Thallasy  negative,   PF grind test negative, Apprehension test negative, Patellar J sign  negative  L. Knee:  Lachman's negative, Anterior Drawer negative, Posterior Drawer negative  Gloria's negative, Thallasy  negative,   PF grind test negative, Apprehension test negative,  Patellar J sign  Negative    Xrays: right TKA well aligned with no signs of aseptic loosening    Radiographic findings reviewed with patient    Impression:

## 2024-09-10 RX ORDER — DICLOFENAC SODIUM 75 MG/1
75 TABLET, DELAYED RELEASE ORAL 2 TIMES DAILY
Qty: 60 TABLET | Refills: 3 | Status: SHIPPED | OUTPATIENT
Start: 2024-09-10

## 2024-09-12 DIAGNOSIS — M25.511 BILATERAL SHOULDER PAIN, UNSPECIFIED CHRONICITY: Primary | ICD-10-CM

## 2024-09-12 DIAGNOSIS — M25.512 BILATERAL SHOULDER PAIN, UNSPECIFIED CHRONICITY: Primary | ICD-10-CM

## 2024-09-16 ENCOUNTER — OFFICE VISIT (OUTPATIENT)
Dept: ORTHOPEDIC SURGERY | Age: 64
End: 2024-09-16
Payer: COMMERCIAL

## 2024-09-16 VITALS — WEIGHT: 265 LBS | BODY MASS INDEX: 41.59 KG/M2 | HEIGHT: 67 IN

## 2024-09-16 DIAGNOSIS — M19.012 GLENOHUMERAL ARTHRITIS, LEFT: Primary | ICD-10-CM

## 2024-09-16 DIAGNOSIS — M19.011 GLENOHUMERAL ARTHRITIS, RIGHT: ICD-10-CM

## 2024-09-16 PROCEDURE — 99213 OFFICE O/P EST LOW 20 MIN: CPT | Performed by: ORTHOPAEDIC SURGERY

## 2024-09-16 PROCEDURE — 20610 DRAIN/INJ JOINT/BURSA W/O US: CPT | Performed by: ORTHOPAEDIC SURGERY

## 2024-09-16 RX ORDER — TRIAMCINOLONE ACETONIDE 40 MG/ML
40 INJECTION, SUSPENSION INTRA-ARTICULAR; INTRAMUSCULAR ONCE
Status: COMPLETED | OUTPATIENT
Start: 2024-09-16 | End: 2024-09-16

## 2024-09-16 RX ADMIN — TRIAMCINOLONE ACETONIDE 40 MG: 40 INJECTION, SUSPENSION INTRA-ARTICULAR; INTRAMUSCULAR at 10:13

## 2024-09-26 ENCOUNTER — TELEPHONE (OUTPATIENT)
Dept: INTERNAL MEDICINE | Age: 64
End: 2024-09-26

## 2024-09-30 ENCOUNTER — TELEPHONE (OUTPATIENT)
Dept: INTERNAL MEDICINE | Age: 64
End: 2024-09-30

## 2024-09-30 NOTE — TELEPHONE ENCOUNTER
Specialty Medication Service    Date: 9/30/2024  Patient's Name: Diane Espinoza YOB: 1960            _____________________________________________________________________________________________    Spoke with patient to schedule Medical Director  appointment for Specialty Medication Services.     Waiting to see if Dr. CARLTON can accommodate the pt's appt needs for 11/21/24 @430    Cely Gonzalez CPhT  Clinical   Specialty Medication Services  Phone: [phone] option #4  Fax: [phone]

## 2024-10-03 NOTE — TELEPHONE ENCOUNTER
Specialty Medication Service    Date: 10/3/2024  Patient's Name: Diane Espinoza YOB: 1960            _____________________________________________________________________________________________    Spoke with patient to schedule Medical Director  appointment for Specialty Medication Services. Patient scheduled 10/08/24 at 430.      Ruchi Ford CPhT  Pharmacy   Specialty Medication Services   Phone: [phone] option 4    For Pharmacy Admin Tracking Only    Program: [facility]  CPA in place:  Yes  Recommendation Provided To: Patient/Caregiver: 1 via Telephone  Intervention Detail: Scheduled Appointment  Intervention Accepted By: Patient/Caregiver: 1  Gap Closed?:    Time Spent (min): 15

## 2024-10-08 ENCOUNTER — PHARMACY VISIT (OUTPATIENT)
Dept: INTERNAL MEDICINE | Age: 64
End: 2024-10-08

## 2024-10-08 DIAGNOSIS — E78.2 MIXED HYPERLIPIDEMIA: Primary | ICD-10-CM

## 2024-10-08 PROCEDURE — 1111F DSCHRG MED/CURRENT MED MERGE: CPT | Performed by: INTERNAL MEDICINE

## 2024-10-08 NOTE — PROGRESS NOTES
Initial Specialty Medication Virtual Visit  [facility]  [facility]  [address] [place] [address]  Dept: [phone]  Dept Fax: [phone]  Date of patient's visit: 10/8/2024  Patient's Name:  Diane Espinoza YOB: 1960            Patient Care Team:  Thais Urias APRN - CNP as PCP - General (Nurse Practitioner)  Thais Urias APRN - CNP as PCP - Empaneled Provider  ================================================================    REASON FOR VISIT/CHIEF COMPLAINT:  No chief complaint on file.    HISTORY OF PRESENTING ILLNESS:  Diane Espinoza is 64 y.o. is here for initial virtual visit for specialty medication.    Specialty Medication: Repatha 140 mg  Frequency: every 14 days  Indication: hyperlipidemia  Initially Diagnosed:   Specialist: Thais Urias  Last Specialist Visit: 2024  Side effects includes: none   Current symptoms include: none  Diane Espinoza has no new complain today.   Recent blood work done on 2024 are reviewed.        DIAGNOSTIC FINDINGS:  CBC:  Lab Results   Component Value Date/Time    WBC 7.2 07/19/2024 10:23 AM    HGB 12.4 07/19/2024 10:23 AM     07/19/2024 10:23 AM       BMP:    Lab Results   Component Value Date/Time     07/19/2024 10:23 AM    K 4.8 07/19/2024 10:23 AM     07/19/2024 10:23 AM    CO2 24 07/19/2024 10:23 AM    BUN 20 07/19/2024 10:23 AM    CREATININE 1.0 07/19/2024 10:23 AM    GLUCOSE 95 07/19/2024 10:23 AM    GLUCOSE 102 12/15/2011 11:05 AM       HEMOGLOBIN A1C:   Lab Results   Component Value Date/Time    LABA1C 5.5 07/19/2024 10:23 AM       FASTING LIPID PANEL:  Lab Results   Component Value Date    CHOL 161 07/19/2024    HDL 47 07/19/2024    TRIG 143 07/19/2024       Lab Results   Component Value Date    SHILPA POSITIVE (A) 08/22/2024       Lab Results   Component Value Date    HEPAIGM Non-Reactive 12/13/2017    HEPBIGM Non-Reactive 12/13/2017           Patient Active Problem List

## 2024-11-06 NOTE — PROGRESS NOTES
[facility] RHEUMATOLOGY  [address] [address]  Dept: [phone]  Dept Fax: [phone]    Diane Espinoza 1960 is a 64 y.o. female, here for evaluation of the following chief complaint(s): New Patient (Positive SHLIPA)      Subjective   SUBJECTIVE/OBJECTIVE:    HPI: Diane Espinoza is a 64 y.o. female with a history of alopecia, polyarticular OA s/p R TKA, NINFA on CPAP, insomnia, GERD, hypothyroidism, depression referred by PCP for +SHILPA. Labs 08/2024 w/SHILPA 1:320, negative RF, normal TSH, normal CMP & CBC.    Patient states she's had alopecia since 1981. She hsa back pain, nodules on her hands. Has had ECSI in the past, now does chiropracty and massage which helps, LBP mostly. Certain motions make back pain worse. Prolonged standing in one place makes back worse. Has insomnia but unsure if bc of pain. AM stiffness transient. Diclofenac helps minimally. Able to do everything she needs to do in the mornings. R middle finger swells. Swelling is mostly in the knuckle, not necessarily the whole finger. Gets itching during the night, diffusely, no rash. Antihistamine helps. Has eczema but no PsO. Denies oral/nasal ulcers. Does get hair loss attributable to alopecia. Denies CP. No Raynaud's.     FMH: No known FMH of autoimmunity    Social: Works in [place]    Review of Systems  10 point ROS negative except as noted in HPI       Objective   Vitals:    11/08/24 1508   BP: (!) 129/54   Pulse: 71   SpO2: 93%        Physical Exam  General appearance: Pleasant, cooperative, in no acute distress  Eyes: Conjunctiva clear, PERRL  HENT: External ears and nose normal  Respiratory: Normal effort and rate; CTAB; no wheezes, rales, or rhonchi  Cardiovascular: RRR; no murmurs, rubs, or gallops; no LE edema  Skin: No rash, thickening, nodules, ulceration, or calcifications  Neurologic: No focal deficit  Musculoskeletal:  Upper Extremities  Elbows: No deformity, swelling, effusion, warmth, or tenderness;

## 2024-11-08 ENCOUNTER — OFFICE VISIT (OUTPATIENT)
Dept: RHEUMATOLOGY | Age: 64
End: 2024-11-08
Payer: COMMERCIAL

## 2024-11-08 VITALS
BODY MASS INDEX: 42.06 KG/M2 | HEIGHT: 67 IN | WEIGHT: 268 LBS | OXYGEN SATURATION: 93 % | HEART RATE: 71 BPM | SYSTOLIC BLOOD PRESSURE: 129 MMHG | DIASTOLIC BLOOD PRESSURE: 54 MMHG

## 2024-11-08 DIAGNOSIS — R76.8 POSITIVE ANA (ANTINUCLEAR ANTIBODY): Primary | ICD-10-CM

## 2024-11-08 DIAGNOSIS — M15.9 POLYARTICULAR OSTEOARTHRITIS: ICD-10-CM

## 2024-11-08 PROCEDURE — 99204 OFFICE O/P NEW MOD 45 MIN: CPT | Performed by: STUDENT IN AN ORGANIZED HEALTH CARE EDUCATION/TRAINING PROGRAM

## 2024-11-08 NOTE — PROGRESS NOTES
Patient complains of back pain for years.  She also has alopecia, as well.  She has pain to hands, shoulders, hips.  She has swelling to her hands.  She takes Voltaren gel and tablet to help with pain.

## 2024-11-19 NOTE — PROGRESS NOTES
Provided To: Patient/Caregiver: 2 via Virtual Visit  Intervention Detail: Scheduled Appointment and Vaccine Recommended/Administered  Intervention Accepted By: Patient/Caregiver: 2   Time Spent (min): 60    Diane Espinoza was evaluated through a synchronous (real-time) audio encounter. Patient identification was verified at the start of the visit. She (or guardian if applicable) is aware that this is a billable service, which includes applicable co-pays. This visit was conducted with the patient's (and/or legal guardian's) verbal consent. She has not had a related appointment within my department in the past 7 days or scheduled within the next 24 hours.   The patient was located at Home: [facility] [place] [facility] [address].  The provider was located at Home (Appt Dept State): [place].  Confirm you are appropriately licensed, registered, or certified to deliver care in the state where the patient is located as indicated above. If you are not or unsure, please re-schedule the visit: Yes, I confirm.

## 2024-11-20 ENCOUNTER — PHARMACY VISIT (OUTPATIENT)
Dept: INTERNAL MEDICINE | Age: 64
End: 2024-11-20

## 2024-11-20 DIAGNOSIS — E78.2 MIXED HYPERLIPIDEMIA: Primary | ICD-10-CM

## 2024-11-20 ASSESSMENT — PATIENT HEALTH QUESTIONNAIRE - PHQ9
1. LITTLE INTEREST OR PLEASURE IN DOING THINGS: NOT AT ALL
SUM OF ALL RESPONSES TO PHQ QUESTIONS 1-9: 0
2. FEELING DOWN, DEPRESSED OR HOPELESS: NOT AT ALL
SUM OF ALL RESPONSES TO PHQ QUESTIONS 1-9: 0
SUM OF ALL RESPONSES TO PHQ9 QUESTIONS 1 & 2: 0

## 2024-12-02 RX ORDER — DICLOFENAC SODIUM 75 MG/1
75 TABLET, DELAYED RELEASE ORAL 2 TIMES DAILY
Qty: 60 TABLET | Refills: 3 | Status: SHIPPED | OUTPATIENT
Start: 2024-12-02

## 2024-12-20 ENCOUNTER — OFFICE VISIT (OUTPATIENT)
Dept: ORTHOPEDIC SURGERY | Age: 64
End: 2024-12-20

## 2024-12-20 DIAGNOSIS — M19.011 GLENOHUMERAL ARTHRITIS, RIGHT: ICD-10-CM

## 2024-12-20 DIAGNOSIS — M19.012 GLENOHUMERAL ARTHRITIS, LEFT: Primary | ICD-10-CM

## 2024-12-20 RX ORDER — TRIAMCINOLONE ACETONIDE 40 MG/ML
40 INJECTION, SUSPENSION INTRA-ARTICULAR; INTRAMUSCULAR ONCE
Status: COMPLETED | OUTPATIENT
Start: 2024-12-20 | End: 2024-12-20

## 2024-12-20 RX ADMIN — TRIAMCINOLONE ACETONIDE 40 MG: 40 INJECTION, SUSPENSION INTRA-ARTICULAR; INTRAMUSCULAR at 12:20

## 2024-12-20 NOTE — PROGRESS NOTES
Left 08/18/2014    lumbar radiofrequency    PAIN MANAGEMENT PROCEDURE Bilateral 12/21/2020    BILATERAL INTRA-ARTICULAR FACET JOINT INJECTION WITH FLUOROSCOPIC GUIDANCE AT L3-4, L4-5, L5-S1 (CPT 44054, 38037) performed by Lula Ann DO at [facility] OR    PAIN MANAGEMENT PROCEDURE Bilateral 02/02/2023    BILATERAL  L4-5 TRANSFORAMINAL  EPIDURAL STEROID INJECTION performed by Lula Ann DO at [facility] OR    TOTAL KNEE ARTHROPLASTY Left 04/28/2021    LEFT KNEE TOTAL KNEE ARTHROPLASTY (SMITH & NEPHEW) performed by Geoff Oliveira DO at [facility] OR    TOTAL KNEE ARTHROPLASTY Right 2/7/2024    RIGHT KNEE TOTAL KNEE ARTHROPLASTY-2/7/24 MCCULLOUGH AND NEPHEW performed by Geoff Oliveira DO at [facility] OR    UPPER GASTROINTESTINAL ENDOSCOPY  12/16/2014    ashly ,bx sm bowel  Dr Isaac       Current Outpatient Medications:     tiZANidine (ZANAFLEX) 4 MG tablet, TAKE ONE TABLET BY MOUTH EVERY 8 HOURS AS NEEDED FOR MUSCLE SPASMS, Disp: 60 tablet, Rfl: 1    diclofenac (VOLTAREN) 75 MG EC tablet, TAKE ONE TABLET BY MOUTH TWICE A DAY, Disp: 60 tablet, Rfl: 3    sertraline (ZOLOFT) 100 MG tablet, TAKE TWO TABLETS BY MOUTH EVERY NIGHT AT BEDTIME, Disp: 180 tablet, Rfl: 3    traZODone (DESYREL) 150 MG tablet, Take 1 tablet by mouth nightly, Disp: 90 tablet, Rfl: 1    Evolocumab (REPATHA) SOSY syringe, Inject 1 mL into the skin every 14 days, Disp: 2 each, Rfl: 5    atenolol (TENORMIN) 50 MG tablet, Take 1 tablet by mouth nightly, Disp: 90 tablet, Rfl: 1    levothyroxine (SYNTHROID) 50 MCG tablet, Take 1 tablet by mouth daily, Disp: 90 tablet, Rfl: 1    famotidine (PEPCID) 20 MG tablet, Take 1 tablet by mouth 2 times daily, Disp: 180 tablet, Rfl: 1    diclofenac sodium (VOLTAREN) 1 % GEL, Apply 2 g topically 4 times daily, Disp: 350 g, Rfl: 3    budesonide-formoterol (SYMBICORT) 160-4.5 MCG/ACT AERO, Inhale 2 puffs into the lungs 2 times daily (Patient taking differently: Inhale 2 puffs into the lungs 2 times

## 2025-02-06 ENCOUNTER — OFFICE VISIT (OUTPATIENT)
Dept: ORTHOPEDIC SURGERY | Age: 65
End: 2025-02-06

## 2025-02-06 VITALS — HEIGHT: 67 IN | BODY MASS INDEX: 42.06 KG/M2 | WEIGHT: 268 LBS

## 2025-02-06 DIAGNOSIS — M19.011 GLENOHUMERAL ARTHRITIS, RIGHT: ICD-10-CM

## 2025-02-06 DIAGNOSIS — S46.011A TRAUMATIC COMPLETE TEAR OF RIGHT ROTATOR CUFF, INITIAL ENCOUNTER: Primary | ICD-10-CM

## 2025-02-06 DIAGNOSIS — M19.011 GLENOHUMERAL ARTHRITIS, RIGHT: Primary | ICD-10-CM

## 2025-02-06 RX ORDER — TRIAMCINOLONE ACETONIDE 40 MG/ML
40 INJECTION, SUSPENSION INTRA-ARTICULAR; INTRAMUSCULAR ONCE
Status: COMPLETED | OUTPATIENT
Start: 2025-02-06 | End: 2025-02-06

## 2025-02-06 RX ORDER — METHYLPREDNISOLONE 4 MG/1
TABLET ORAL
Qty: 1 KIT | Refills: 0 | Status: SHIPPED | OUTPATIENT
Start: 2025-02-06 | End: 2025-02-12

## 2025-02-06 RX ADMIN — TRIAMCINOLONE ACETONIDE 40 MG: 40 INJECTION, SUSPENSION INTRA-ARTICULAR; INTRAMUSCULAR at 14:34

## 2025-02-06 NOTE — PROGRESS NOTES
Chief Complaint   Patient presents with    Shoulder Pain     Two weeks ago Diane was lifting bags in the grocery store to put them in the cart when her pain started. Pain in the right shoulder is described as sore. Patient is using ice, heat and rubbing her shoulder for relief, but its not getting better. Patient is RHD.         Diane Espinoza is a 64 y.o. year old   female who is seen today  for evaluation of right shoulder pain. She stated 2 weeks ago she was lifting grocery bags and developed severe pain afterwards. She has been taking OTC medications, using ice/heat, creams without relief.    Chief Complaint   Patient presents with    Shoulder Pain     Two weeks ago Diane was lifting bags in the grocery store to put them in the cart when her pain started. Pain in the right shoulder is described as sore. Patient is using ice, heat and rubbing her shoulder for relief, but its not getting better. Patient is RHD.      Past Medical History:   Diagnosis Date    Alopecia areata     Anxiety     Arthritis     Carpal tunnel syndrome     Depression     GERD (gastroesophageal reflux disease)     Hyperlipidemia     Obesity, Class III, BMI 40-49.9 (morbid obesity)     Osteoarthritis     PONV (postoperative nausea and vomiting)     PVC (premature ventricular contraction)     controlled w Atenolol    Sleep apnea     CPAP    Thyroid disease      Past Surgical History:   Procedure Laterality Date    BREAST SURGERY Left     cyst benign    CARPAL TUNNEL RELEASE Right 05/15/2023    RIGHT CARPAL TUNNEL RELEASE performed by Fina Morgan MD at [facility] OR    CARPAL TUNNEL RELEASE Left 2023    LEFT CARPAL TUNNEL RELEASE performed by Fina Morgan MD at [facility] OR     SECTION      x2    CHOLECYSTECTOMY      COLONOSCOPY  2014    bx done Dr Isaac    ECHO COMPL W DOP COLOR FLOW  2011         KNEE ARTHROSCOPY Left     KNEE ARTHROSCOPY Left 2023    LEFT KNEE ARTHROSCOPY LYSIS OF ADEHSIONS WITH

## 2025-02-24 ENCOUNTER — HOSPITAL ENCOUNTER (OUTPATIENT)
Dept: MRI IMAGING | Age: 65
Discharge: HOME | End: 2025-02-26
Payer: COMMERCIAL

## 2025-02-24 DIAGNOSIS — S46.011A TRAUMATIC COMPLETE TEAR OF RIGHT ROTATOR CUFF, INITIAL ENCOUNTER: ICD-10-CM

## 2025-02-24 PROCEDURE — 73221 MRI JOINT UPR EXTREM W/O DYE: CPT

## 2025-03-03 RX ORDER — DICLOFENAC SODIUM 75 MG/1
75 TABLET, DELAYED RELEASE ORAL 2 TIMES DAILY
Qty: 60 TABLET | Refills: 3 | Status: SHIPPED | OUTPATIENT
Start: 2025-03-03

## 2025-03-11 ENCOUNTER — OFFICE VISIT (OUTPATIENT)
Dept: ORTHOPEDIC SURGERY | Age: 65
End: 2025-03-11
Payer: COMMERCIAL

## 2025-03-11 VITALS — HEIGHT: 67 IN | WEIGHT: 270 LBS | BODY MASS INDEX: 42.38 KG/M2 | TEMPERATURE: 98.6 F

## 2025-03-11 DIAGNOSIS — S46.011A TRAUMATIC COMPLETE TEAR OF RIGHT ROTATOR CUFF, INITIAL ENCOUNTER: Primary | ICD-10-CM

## 2025-03-11 DIAGNOSIS — M19.011 GLENOHUMERAL ARTHRITIS, RIGHT: ICD-10-CM

## 2025-03-11 PROCEDURE — 99213 OFFICE O/P EST LOW 20 MIN: CPT | Performed by: ORTHOPAEDIC SURGERY

## 2025-03-11 NOTE — PROGRESS NOTES
palpation,  there is no lymphadenopathy noted in bilateral upper extremities.      Musculoskeletal:  Gait: normal; examination of the nails and digits reveal no cyanosis or clubbing.      Cervical Exam:  On physical exam, Diane Espinoza is well-developed, well-nourished, oriented to person, place and time.  her gait is normal.  On evaluation of hercervical spine, She has full range of motion of the cervical spine without pain.  There is no cervical tenderness to palpation.     Shoulder Exam:  On evaluation of her bilaterally upper extremities, her bilateral shoulder has no deformity.  There is tenderness upon palpation of the anterior shoulder.  There is not evidence of scapular dyskinesis.  There is not muscle atrophy in shoulder girdle. The range of motion for the Right Shoulder is 120/40/BL and for the Left shoulder is 140/40/BL.  Right shoulder Motor strength is 5-/5 in the supraspinatus, 5/5 internal rotation and 5-/5 in external rotation, and Left shoulder motor strength 5/5 in supraspinatus, 5/5 in internal rotation, 5/5 in external rotation.        Right shoulder:  positive Impingement , positive Street ,negative  Speeds,negative  Apprehension ,negative Aguilar Load Shift, negative Ivy manuver, negative Cross arm test.     Left shoulder:  negative Impingement , negative Street ,negative  Speeds,negative  Apprehension ,negative Aguilar Load Shift, negative Ivy manuver, negative Cross arm test.     XRAY:  Glenohumeral djd     MRI:    1. AVN or subchondral fracture at the superior and medial humeral head with  subjacent marrow edema.  2. Retracted full-thickness tearing of supraspinatus with retraction of the  torn fibers from the footplate measuring up to 2.4 cm.  Debris within the  subacromial subdeltoid bursa.  3. Severe glenohumeral chondromalacia.  4. Degenerative tearing of the anterior and inferior labrum.  5. Severe glenohumeral chondromalacia.  Small debris containing glenohumeral  joint

## 2025-05-05 DIAGNOSIS — M19.012 GLENOHUMERAL ARTHRITIS, LEFT: ICD-10-CM

## 2025-05-05 DIAGNOSIS — M19.011 GLENOHUMERAL ARTHRITIS, RIGHT: Primary | ICD-10-CM

## 2025-05-06 ENCOUNTER — OFFICE VISIT (OUTPATIENT)
Dept: ORTHOPEDIC SURGERY | Age: 65
End: 2025-05-06
Payer: COMMERCIAL

## 2025-05-06 VITALS — BODY MASS INDEX: 42.38 KG/M2 | WEIGHT: 270 LBS | HEIGHT: 67 IN

## 2025-05-06 DIAGNOSIS — M19.012 GLENOHUMERAL ARTHRITIS, LEFT: ICD-10-CM

## 2025-05-06 DIAGNOSIS — M19.011 GLENOHUMERAL ARTHRITIS, RIGHT: Primary | ICD-10-CM

## 2025-05-06 PROCEDURE — 99213 OFFICE O/P EST LOW 20 MIN: CPT | Performed by: ORTHOPAEDIC SURGERY

## 2025-05-06 PROCEDURE — 20610 DRAIN/INJ JOINT/BURSA W/O US: CPT | Performed by: ORTHOPAEDIC SURGERY

## 2025-05-06 RX ORDER — TRIAMCINOLONE ACETONIDE 40 MG/ML
40 INJECTION, SUSPENSION INTRA-ARTICULAR; INTRAMUSCULAR ONCE
Status: COMPLETED | OUTPATIENT
Start: 2025-05-06 | End: 2025-05-06

## 2025-05-06 RX ADMIN — TRIAMCINOLONE ACETONIDE 40 MG: 40 INJECTION, SUSPENSION INTRA-ARTICULAR; INTRAMUSCULAR at 09:24

## 2025-05-06 NOTE — PROGRESS NOTES
Chief Complaint   Patient presents with    Shoulder Pain     Patient is presenting today for b/l shoulder pain. Pain is worse in the right shoulder. Pain is described as achy and rated 8/10 constantly. She is RHD. No numbness or tingling in arms.         Diane Espinoza is a 64 y.o. year old   female who is seen today  for evaluation of bilateral shoulder pain R>L. She stated she is about the same as when I saw her last. Previous treatment was successful for short term.    Chief Complaint   Patient presents with    Shoulder Pain     Patient is presenting today for b/l shoulder pain. Pain is worse in the right shoulder. Pain is described as achy and rated 8/10 constantly. She is RHD. No numbness or tingling in arms.      Past Medical History:   Diagnosis Date    Alopecia areata     Anxiety     Arthritis     Carpal tunnel syndrome     Depression     GERD (gastroesophageal reflux disease)     Hyperlipidemia     Obesity, Class III, BMI 40-49.9 (morbid obesity) (HCC)     Osteoarthritis     PONV (postoperative nausea and vomiting)     PVC (premature ventricular contraction)     controlled w Atenolol    Sleep apnea     CPAP    Thyroid disease      Past Surgical History:   Procedure Laterality Date    BREAST SURGERY Left     cyst benign    CARPAL TUNNEL RELEASE Right 05/15/2023    RIGHT CARPAL TUNNEL RELEASE performed by Fina Morgan MD at [facility] OR    CARPAL TUNNEL RELEASE Left 2023    LEFT CARPAL TUNNEL RELEASE performed by Fina Morgan MD at [facility] OR     SECTION      x2    CHOLECYSTECTOMY      COLONOSCOPY  2014    bx done Dr Isaac    ECHO COMPL W DOP COLOR FLOW  2011         KNEE ARTHROSCOPY Left     KNEE ARTHROSCOPY Left 2023    LEFT KNEE ARTHROSCOPY LYSIS OF ADEHSIONS WITH SYNOVECTOMY-23 performed by Geoff Oliveira DO at [facility] OR    NERVE BLOCK Bilateral 2014    lumbar facet #1    NERVE BLOCK  2014    paravertebral facet bilateral lumbar #2

## 2025-05-07 NOTE — PROGRESS NOTES
bothered by emotional problems such as feeling anxious, depressed or irritable? 5 3   In the past 7 days how would you rate your fatigue on average? 5 4   In the past 7 days how would you rate your pain on average? 5 2   How comfortable are you filling out medical forms by yourself?  4   What amount of pain have you experienced in the last week in your other knee/hip?  1   My BACK PAIN at the moment is  2   Mode of Collection 1 2   Person Completing Survey 0 0   PROMIS Physical Score 19 14   PROMIS Mental Score 19 15     Lipids  Diane Espinoza is a 64 y.o. female being treated for hyperlipidemia.  Medication Reconciliation completed (information obtained from patient), no drug-drug interactions identified. Allergy and diagnosis info reviewed and updated. Diane Espinoza has a history remarkable for the following conditions: acquired hypothyroidism, anxiety, depression, COPD, dyslipidemia, cervical radiculopathy, degenerative disc disease, GERD, insomnia, premature ventricular contractions, history of bilateral knee replacements, and sleep apnea   Current therapy includes: Repatha 140mg every 14 days, fish oil 2,000mg once daily  Medication Effectiveness: Patient disease is  well controlled on current therapy.   Patient had no questions regarding the medication's warnings, precautions, and contraindications. Confirmed appropriate storage and disposal.  Patient had no concerns with the administration process.  Current disease state symptoms include: Lipids: 2025: TC: 214, HDL: 51, LDL: 134, TR  One side effects/adverse events reported: UTIs: More frequent since starting Repatha, and no adherence issues identified.  Functional and cognitive limitations include: None  Patient is not considered high risk. Based on patient feedback/results of the assessment, the therapy is  still appropriate.   No medication-related problem(s) or patient need(s) identified that would require a care plan. Follow up in 180 days

## 2025-05-12 ENCOUNTER — PHARMACY VISIT (OUTPATIENT)
Age: 65
End: 2025-05-12

## 2025-05-12 DIAGNOSIS — E78.2 MIXED HYPERLIPIDEMIA: ICD-10-CM

## 2025-05-12 RX ORDER — CHLORAL HYDRATE 500 MG
2 CAPSULE ORAL DAILY
COMMUNITY

## 2025-05-12 ASSESSMENT — PROMIS GLOBAL HEALTH SCALE
IN THE PAST 7 DAYS, HOW OFTEN HAVE YOU BEEN BOTHERED BY EMOTIONAL PROBLEMS, SUCH AS FEELING ANXIOUS, DEPRESSED, OR IRRITABLE [ON A SCALE FROM 1 (NEVER) TO 5 (ALWAYS)]?: NEVER
TO WHAT EXTENT ARE YOU ABLE TO CARRY OUT YOUR EVERYDAY PHYSICAL ACTIVITIES SUCH AS WALKING, CLIMBING STAIRS, CARRYING GROCERIES, OR MOVING A CHAIR [ON A SCALE OF 1 (NOT AT ALL) TO 5 (COMPLETELY)]?: COMPLETELY
IN GENERAL, HOW WOULD YOU RATE YOUR MENTAL HEALTH, INCLUDING YOUR MOOD AND YOUR ABILITY TO THINK [ON A SCALE OF 1 (POOR) TO 5 (EXCELLENT)]?: EXCELLENT
IN GENERAL, HOW WOULD YOU RATE YOUR PHYSICAL HEALTH [ON A SCALE OF 1 (POOR) TO 5 (EXCELLENT)]?: VERY GOOD
IN GENERAL, WOULD YOU SAY YOUR QUALITY OF LIFE IS...[ON A SCALE OF 1 (POOR) TO 5 (EXCELLENT)]: VERY GOOD
IN THE PAST 7 DAYS, HOW WOULD YOU RATE YOUR PAIN ON AVERAGE [ON A SCALE FROM 0 (NO PAIN) TO 10 (WORST IMAGINABLE PAIN)]?: 5
IN THE PAST 7 DAYS, HOW WOULD YOU RATE YOUR FATIGUE ON AVERAGE [ON A SCALE FROM 1 (NONE) TO 5 (VERY SEVERE)]?: MILD
IN GENERAL, HOW WOULD YOU RATE YOUR SATISFACTION WITH YOUR SOCIAL ACTIVITIES AND RELATIONSHIPS [ON A SCALE OF 1 (POOR) TO 5 (EXCELLENT)]?: EXCELLENT
SUM OF RESPONSES TO QUESTIONS 3, 6, 7, & 8: 18
SUM OF RESPONSES TO QUESTIONS 2, 4, 5, & 10: 19
IN GENERAL, PLEASE RATE HOW WELL YOU CARRY OUT YOUR USUAL SOCIAL ACTIVITIES (INCLUDES ACTIVITIES AT HOME, AT WORK, AND IN YOUR COMMUNITY, AND RESPONSIBILITIES AS A PARENT, CHILD, SPOUSE, EMPLOYEE, FRIEND, ETC) [ON A SCALE OF 1 (POOR) TO 5 (EXCELLENT)]?: EXCELLENT
IN GENERAL, WOULD YOU SAY YOUR HEALTH IS...[ON A SCALE OF 1 (POOR) TO 5 (EXCELLENT)]: VERY GOOD

## 2025-05-27 RX ORDER — DICLOFENAC SODIUM 75 MG/1
75 TABLET, DELAYED RELEASE ORAL 2 TIMES DAILY
Qty: 60 TABLET | Refills: 3 | Status: SHIPPED | OUTPATIENT
Start: 2025-05-27

## 2025-06-03 ENCOUNTER — OFFICE VISIT (OUTPATIENT)
Dept: ORTHOPEDIC SURGERY | Age: 65
End: 2025-06-03
Payer: COMMERCIAL

## 2025-06-03 VITALS — TEMPERATURE: 98 F | HEIGHT: 67 IN | BODY MASS INDEX: 42.38 KG/M2 | WEIGHT: 270 LBS

## 2025-06-03 DIAGNOSIS — S46.011A TRAUMATIC COMPLETE TEAR OF RIGHT ROTATOR CUFF, INITIAL ENCOUNTER: ICD-10-CM

## 2025-06-03 DIAGNOSIS — M19.011 GLENOHUMERAL ARTHRITIS, RIGHT: Primary | ICD-10-CM

## 2025-06-03 PROCEDURE — 99213 OFFICE O/P EST LOW 20 MIN: CPT | Performed by: ORTHOPAEDIC SURGERY

## 2025-06-03 RX ORDER — OXYCODONE AND ACETAMINOPHEN 5; 325 MG/1; MG/1
1 TABLET ORAL EVERY 6 HOURS PRN
Qty: 28 TABLET | Refills: 0 | Status: SHIPPED | OUTPATIENT
Start: 2025-06-03 | End: 2025-06-03 | Stop reason: CLARIF

## 2025-06-03 RX ORDER — OXYCODONE AND ACETAMINOPHEN 10; 325 MG/1; MG/1
1 TABLET ORAL EVERY 6 HOURS PRN
Qty: 28 TABLET | Refills: 0 | Status: SHIPPED | OUTPATIENT
Start: 2025-06-03 | End: 2025-06-10

## 2025-06-03 NOTE — PROGRESS NOTES
within the  subacromial subdeltoid bursa.  3. Severe glenohumeral chondromalacia.  4. Degenerative tearing of the anterior and inferior labrum.  5. Severe glenohumeral chondromalacia.  Small debris containing glenohumeral  joint effusion.  6. Low-grade multifocal partial-thickness interstitial and articular-surface  tearing of infraspinatus between musculotendinous junction and footplate.  Moderate underlying infraspinatus tendinosis.  7. Moderate atrophy and fatty degeneration of infraspinatus.  Moderate  atrophy and fatty degeneration of teres minor.     Radiographic findings reviewed with patient    Impression:   Encounter Diagnoses   Name Primary?    Glenohumeral arthritis, right Yes    Traumatic complete tear of right rotator cuff, initial encounter            Plan: Natural history and expected course discussed. Questions answered.  Educational material distributed.  Reduction in offending activity.  Gentle ROM exercises  OTC analgesics as needed.    I had a lengthy discussion with the patient regarding their diagnosis. I explained treatment options including surgical vs non surgical treatment. I reviewed in detail the risks and benefits and outlined the procedure in detail with expected outcomes and possible complications.  I also discussed non surgical treatment such as injections (CSI and visco supplementation), physical therapy, topical creams and NSAID's. They have elected for surgical management at this time.     Follow up 2 months to discuss reverse TSA    Percocet 10mg

## 2025-08-12 DIAGNOSIS — M19.011 GLENOHUMERAL ARTHRITIS, RIGHT: Primary | ICD-10-CM

## 2025-08-12 DIAGNOSIS — S46.011A TRAUMATIC COMPLETE TEAR OF RIGHT ROTATOR CUFF, INITIAL ENCOUNTER: ICD-10-CM

## 2025-08-13 ENCOUNTER — OFFICE VISIT (OUTPATIENT)
Dept: ORTHOPEDIC SURGERY | Age: 65
End: 2025-08-13

## 2025-08-13 VITALS — BODY MASS INDEX: 42.38 KG/M2 | WEIGHT: 270 LBS | HEIGHT: 67 IN

## 2025-08-13 DIAGNOSIS — S46.012A TRAUMATIC COMPLETE TEAR OF LEFT ROTATOR CUFF, INITIAL ENCOUNTER: ICD-10-CM

## 2025-08-13 DIAGNOSIS — M19.011 GLENOHUMERAL ARTHRITIS, RIGHT: Primary | ICD-10-CM

## 2025-08-13 DIAGNOSIS — S46.011A TRAUMATIC COMPLETE TEAR OF RIGHT ROTATOR CUFF, INITIAL ENCOUNTER: ICD-10-CM

## 2025-08-13 DIAGNOSIS — M19.012 GLENOHUMERAL ARTHRITIS, LEFT: ICD-10-CM

## 2025-08-13 RX ORDER — TRIAMCINOLONE ACETONIDE 40 MG/ML
40 INJECTION, SUSPENSION INTRA-ARTICULAR; INTRAMUSCULAR ONCE
Status: COMPLETED | OUTPATIENT
Start: 2025-08-13 | End: 2025-08-13

## 2025-08-13 RX ORDER — LIDOCAINE 50 MG/G
1 PATCH TOPICAL DAILY
Qty: 10 PATCH | Refills: 3 | Status: SHIPPED | OUTPATIENT
Start: 2025-08-13 | End: 2025-09-22

## 2025-08-13 RX ORDER — OXYCODONE AND ACETAMINOPHEN 10; 325 MG/1; MG/1
1 TABLET ORAL EVERY 6 HOURS PRN
Qty: 28 TABLET | Refills: 0 | Status: SHIPPED | OUTPATIENT
Start: 2025-08-13 | End: 2025-08-20

## 2025-08-13 RX ADMIN — TRIAMCINOLONE ACETONIDE 40 MG: 40 INJECTION, SUSPENSION INTRA-ARTICULAR; INTRAMUSCULAR at 10:54

## 2025-08-14 ENCOUNTER — TELEPHONE (OUTPATIENT)
Dept: FAMILY MEDICINE CLINIC | Age: 65
End: 2025-08-14

## 2025-08-25 RX ORDER — DICLOFENAC SODIUM 75 MG/1
75 TABLET, DELAYED RELEASE ORAL 2 TIMES DAILY
Qty: 60 TABLET | Refills: 3 | Status: SHIPPED | OUTPATIENT
Start: 2025-08-25

## (undated) DEVICE — TRAY SET HAND REUSABLE

## (undated) DEVICE — SOLUTION IRRIG 500ML 0.9% SOD CHLO USP POUR PLAS BTL

## (undated) DEVICE — SPONGE LAP W18XL18IN WHT COT 4 PLY FLD STRUNG RADPQ DISP ST 2 PER PACK

## (undated) DEVICE — SURGICAL PROCEDURE PACK HND

## (undated) DEVICE — TRAY,SKIN SCRUB,DRY,W/GAUZE: Brand: MEDLINE

## (undated) DEVICE — BANDAGE ROLL GZ 3 ST  96/CS

## (undated) DEVICE — COVER,LIGHT HANDLE,FLX,1/PK: Brand: MEDLINE INDUSTRIES, INC.

## (undated) DEVICE — SUTURE SUTTAPE L40IN DIA1.3MM NONABSORBABLE WHT BLU L26.5MM AR7500

## (undated) DEVICE — 6 ML SYRINGE LUER-LOCK TIP: Brand: MONOJECT

## (undated) DEVICE — YANKAUER,OPEN TIP,W/O VENT,STERILE: Brand: MEDLINE INDUSTRIES, INC.

## (undated) DEVICE — SOLUTION IV IRRIG 500ML 0.9% SODIUM CHL 2F7123

## (undated) DEVICE — PACK,EXTREMITY,ORTHOMAX,5/CS: Brand: MEDLINE

## (undated) DEVICE — PICO 7 10CM X 30CM: Brand: PICO™ 7

## (undated) DEVICE — MEDI-VAC YANKAUER SUCTION HANDLE: Brand: CARDINAL HEALTH

## (undated) DEVICE — GOWN,SIRUS,FABRNF,XL,20/CS: Brand: MEDLINE

## (undated) DEVICE — 3 ML SYRINGE LUER-LOCK TIP: Brand: MONOJECT

## (undated) DEVICE — SYRINGE IRRIG 60ML SFT PLIABLE BLB EZ TO GRP 1 HND USE W/

## (undated) DEVICE — BANDAGE COMPR W6INXL12FT SMOOTH FOR LIMB EXSANG ESMARCH

## (undated) DEVICE — PEEL-AWAY HOOD: Brand: FLYTE, SURGICOOL

## (undated) DEVICE — GOWN,SIRUS,POLYRNF,BRTHSLV,XLN/XL,20/CS: Brand: MEDLINE

## (undated) DEVICE — APPLICATOR MEDICATED 26 CC SOLUTION HI LT ORNG CHLORAPREP

## (undated) DEVICE — STERILE VELCLOSE ELASTIC BANDAGE, 4IN X 10 YARDS: Brand: VELCLOSE

## (undated) DEVICE — MARKER,SKIN,WI/RULER AND LABELS: Brand: MEDLINE

## (undated) DEVICE — Z DISCONTINUED USE 2275686 GLOVE SURG SZ 8 L12IN FNGR THK13MIL WHT ISOLEX POLYISOPRENE

## (undated) DEVICE — NEEDLE SPNL 22GA L5IN BLK HUB S STL W/ QNCKE PNT W/OUT

## (undated) DEVICE — MATERIAL CAST W3INXL125FT 8 15MMHG BLK SUPP SEAMLESS ULT SHR

## (undated) DEVICE — NEEDLE SPNL L3.5IN PNK HUB S STL REG WALL FIT STYL W/ QNCKE

## (undated) DEVICE — PADDING CAST W4INXL4YD NONSTERILE COT COHESIVE HND TEARABLE

## (undated) DEVICE — NON-DEHP CATHETER EXTENSION SET, MALE LUER LOCK ADAPTER

## (undated) DEVICE — 3M™ STERI-DRAPE™ U-DRAPE 1015: Brand: STERI-DRAPE™

## (undated) DEVICE — Z DISCONTINUED APPLICATOR SURG PREP 0.35OZ 2% CHG 70% ISO ALC W/ HI LT

## (undated) DEVICE — T4 HOOD

## (undated) DEVICE — TOWEL OR BLUEE 16X26IN ST 8 PACK ORB08 16X26ORTWL

## (undated) DEVICE — BANDAGE COMPR W6XL12FT SGL LAYERED NO CLSR EXSANGUATION

## (undated) DEVICE — SYRINGE MED 50ML LUERLOCK TIP

## (undated) DEVICE — 3M™ RED DOT™ MONITORING ELECTRODE WITH FOAM TAPE AND STICKY GEL 2560, 50/BAG, 20/CASE, 72/PLT: Brand: RED DOT™

## (undated) DEVICE — NEEDLE FLTR 18GA L1.5IN MEM THK5UM BLNT DISP

## (undated) DEVICE — PAD POS ARTHSCP DISP PIVOTPOST

## (undated) DEVICE — BANDAGE COMPR W6INXL5YD SELF ADH COHESIVE CO FLX

## (undated) DEVICE — GLOVE ORTHO 8   MSG9480

## (undated) DEVICE — CUFF TOURNIQUET 34 SNG BLADDER DUAL PORT

## (undated) DEVICE — SOLUTION SCRB 4OZ 7.5% POVIDONE IOD ANTIMIC BTL

## (undated) DEVICE — NEEDLE HYPO 25GA L1.5IN BLU POLYPR HUB S STL REG BVL STR

## (undated) DEVICE — SMARTGOWN BREATHABLE SPECIALTY GOWN: Brand: CONVERTORS

## (undated) DEVICE — GARMENT,MEDLINE,DVT,INT,CALF,MED, GEN2: Brand: MEDLINE

## (undated) DEVICE — SPONGE LAP W18XL18IN WHT COT 4 PLY FLD STRUNG RADPQ DISP ST

## (undated) DEVICE — BANDAGE COMPR M W6INXL10YD WHT BGE VELC E MTRX HK AND LOOP

## (undated) DEVICE — 3 BONE CEMENT MIXER: Brand: MIXEVAC

## (undated) DEVICE — SOLUTION SCRB 32OZ 7.5% POVIDONE IOD BTL GENTLE EFFECTIVE

## (undated) DEVICE — CLOTH PREP W7.5XL7.5IN 2% CHG SKIN ALC AND RNS FREE

## (undated) DEVICE — GLOVE SURG SZ 8 L12IN FNGR THK94MIL TRNSLUC YEL LTX HYDRGEL

## (undated) DEVICE — DRESSING GZ XRFRM 4X4(25/BX 6BX/CS)

## (undated) DEVICE — PITCHER PT 1200ML W HNDL CSR WRP

## (undated) DEVICE — 450 ML BOTTLE OF 0.05% CHLORHEXIDINE GLUCONATE IN 99.95% STERILE WATER FOR IRRIGATION, USP AND APPLICATOR.: Brand: IRRISEPT ANTIMICROBIAL WOUND LAVAGE

## (undated) DEVICE — NEEDLE SPNL WEISS LNG 18 GAX5 IN MOD TUOHY PT TW PERISAFE

## (undated) DEVICE — BANDAGE COMPR L W4INXL11YD 100% COT WVN E DBL LEN CLP CLSR

## (undated) DEVICE — Z INACTIVE USE 2863041 SPONGE GZ W4XL4IN 100% COT 16 PLY RADPQ HIGHLY ABSRB

## (undated) DEVICE — STERILE PVP: Brand: MEDLINE INDUSTRIES, INC.

## (undated) DEVICE — ELECTRODE PT RET AD L9FT HI MOIST COND ADH HYDRGEL CORDED

## (undated) DEVICE — STANDARD HYPODERMIC NEEDLE,POLYPROPYLENE HUB: Brand: MONOJECT

## (undated) DEVICE — BLADE SHV L13CM DIA4MM TAPR TIP SCIS LIKE CUT OVL OUTER

## (undated) DEVICE — TOWEL,OR,DSP,ST,BLUE,STD,6/PK,12PK/CS: Brand: MEDLINE

## (undated) DEVICE — BANDAGE ADH W1XL3IN NAT FAB WVN FLX DURABLE N ADH PD SEAL

## (undated) DEVICE — Z INACTIVE USE 2855128 SPONGE GZ 16 PLY WVN COT 4INX4IN  HHH

## (undated) DEVICE — SOLUTION IV 1000ML 0.9% SOD CHL PH 5 INJ USP VIAFLX PLAS

## (undated) DEVICE — BASIC SINGLE BASIN 1-LF: Brand: MEDLINE INDUSTRIES, INC.

## (undated) DEVICE — SHEET,DRAPE,70X100,STERILE: Brand: MEDLINE

## (undated) DEVICE — 12 ML SYRINGE,LUER-LOCK TIP: Brand: MONOJECT

## (undated) DEVICE — BANDAGE,GAUZE,BULKEE LITE,2" X3.5YD,STRL: Brand: MEDLINE

## (undated) DEVICE — DRESSING PETRO W3XL8IN OIL EMUL N ADH GZ KNIT IMPREG CELOS

## (undated) DEVICE — TRAY CARPAL TUNNEL SET REUSABLE

## (undated) DEVICE — NEEDLE HYPO 18GA L1.5IN PNK POLYPR HUB S STL THN WALL FILL

## (undated) DEVICE — NEEDLE SYR 18GA L1.5IN RED PLAS HUB S STL BLNT FILL W/O

## (undated) DEVICE — PEN: MARKING STD 100/CS: Brand: MEDICAL ACTION INDUSTRIES

## (undated) DEVICE — COVER,TABLE,60X90,STERILE: Brand: MEDLINE

## (undated) DEVICE — SOLUTION IV IRRIG POUR BRL 0.9% SODIUM CHL 2F7124

## (undated) DEVICE — BASIC DOUBLE BASIN 2-LF: Brand: MEDLINE INDUSTRIES, INC.

## (undated) DEVICE — NDL CNTR 40CT FM MAG: Brand: MEDLINE INDUSTRIES, INC.

## (undated) DEVICE — GLOVE ORANGE PI 8   MSG9080

## (undated) DEVICE — BLADE CLIPPER GEN PURP NS

## (undated) DEVICE — GAUZE,SPONGE,4"X4",12PLY,STERILE,LF,2'S: Brand: MEDLINE

## (undated) DEVICE — HANDPIECE SET WITH BONE CLEANING TIP: Brand: INTERPULSE

## (undated) DEVICE — STAPLER SKIN LEG L3.9MM DIA0.53MM WIDE ROT HD FOR WND CLSR

## (undated) DEVICE — 3M™ IOBAN™ 2 ANTIMICROBIAL INCISE DRAPE 6650EZ: Brand: IOBAN™ 2

## (undated) DEVICE — INTENDED FOR TISSUE SEPARATION, AND OTHER PROCEDURES THAT REQUIRE A SHARP SURGICAL BLADE TO PUNCTURE OR CUT.: Brand: BARD-PARKER ® STAINLESS STEEL BLADES

## (undated) DEVICE — BLADE SHV L13CM DIA4MM CRV DBL CUT COOLCUT

## (undated) DEVICE — NEPTUNE E-SEP SMOKE EVACUATION PENCIL, COATED, 70MM BLADE, PUSH BUTTON SWITCH: Brand: NEPTUNE E-SEP

## (undated) DEVICE — PENCIL ES L3M BTTN SWCH HOLSTER W/ BLDE ELECTRD EDGE

## (undated) DEVICE — MEDI-VAC NON-CONDUCTIVE SUCTION TUBING: Brand: CARDINAL HEALTH

## (undated) DEVICE — PACK PROC ORTH LO EXT IX CUST

## (undated) DEVICE — COUNTER NDL W1.5XL2.5IN 10 COUNT

## (undated) DEVICE — GAUZE,SPONGE,4"X4",8PLY,STRL,LF,10/TRAY: Brand: MEDLINE

## (undated) DEVICE — 3M™ COBAN™ SELF-ADHERENT WRAP, 1586S, STERILE, 6 IN X 5 YD (15 CM X 4,5 M), 12 ROLLS/CASE: Brand: 3M™ COBAN™

## (undated) DEVICE — BLADE,STAINLESS-STEEL,15,STRL,DISPOSABLE: Brand: MEDLINE

## (undated) DEVICE — 3M™ IOBAN™ 2 ANTIMICROBIAL INCISE DRAPE 6640EZ: Brand: IOBAN™ 2

## (undated) DEVICE — SET MAJOR INSTR ORTHO

## (undated) DEVICE — TUBING PMP L16FT MAIN DISP FOR AR-6400 AR-6475

## (undated) DEVICE — TUBING, SUCTION, 1/4" X 10', STRAIGHT: Brand: MEDLINE

## (undated) DEVICE — BLADE,STAINLESS-STEEL,10,STRL,DISPOSABLE: Brand: MEDLINE

## (undated) DEVICE — GOWN,SIRUS,POLYRNF,RAGLAN,XL,ST,30/CS: Brand: MEDLINE

## (undated) DEVICE — 4-PORT MANIFOLD: Brand: NEPTUNE 2

## (undated) DEVICE — SHEET DRAPE FULL 70X100

## (undated) DEVICE — SET SURG BASIN MAYO REUSABLE

## (undated) DEVICE — SUTURE PROL SZ 3-0 L18IN NONABSORBABLE BLU L19MM PS-2 3/8 8687H

## (undated) DEVICE — SOLUTION IRRIG 1000ML 0.9% SOD CHL USP POUR PLAS BTL

## (undated) DEVICE — WIPES SKIN CLOTH READYPREP 9 X 10.5 IN 2% CHLORHEX GLUCONATE CHG PREOP

## (undated) DEVICE — DRESSING HYDROFIBER AQUACEL AG ADVANTAGE 3.5X12 IN

## (undated) DEVICE — ENCORE® LATEX TEXTURED SIZE 6.5, STERILE LATEX POWDER-FREE SURGICAL GLOVE: Brand: ENCORE

## (undated) DEVICE — ZIMMER® STERILE DISPOSABLE TOURNIQUET CUFF WITH PLC, DUAL PORT, SINGLE BLADDER, 18 IN. (46 CM)

## (undated) DEVICE — CHLORAPREP 26ML ORANGE

## (undated) DEVICE — DOUBLE BASIN SET: Brand: MEDLINE INDUSTRIES, INC.

## (undated) DEVICE — BLADE SAW SAG NAR THCK LNG 12.5MM

## (undated) DEVICE — SHEET SUPPORT

## (undated) DEVICE — CANNULA IV 18GA L15IN BLNT FILL LUERLOCK HUB MJCT